# Patient Record
Sex: MALE | Race: WHITE | Employment: OTHER | URBAN - METROPOLITAN AREA
[De-identification: names, ages, dates, MRNs, and addresses within clinical notes are randomized per-mention and may not be internally consistent; named-entity substitution may affect disease eponyms.]

---

## 2017-02-10 ENCOUNTER — IP HISTORICAL/CONVERTED ENCOUNTER (OUTPATIENT)
Dept: OTHER | Age: 62
End: 2017-02-10

## 2017-03-06 ENCOUNTER — OP HISTORICAL/CONVERTED ENCOUNTER (OUTPATIENT)
Dept: OTHER | Age: 62
End: 2017-03-06

## 2017-04-04 ENCOUNTER — OP HISTORICAL/CONVERTED ENCOUNTER (OUTPATIENT)
Dept: OTHER | Age: 62
End: 2017-04-04

## 2017-08-05 ENCOUNTER — OP HISTORICAL/CONVERTED ENCOUNTER (OUTPATIENT)
Dept: OTHER | Age: 62
End: 2017-08-05

## 2017-08-14 ENCOUNTER — OP HISTORICAL/CONVERTED ENCOUNTER (OUTPATIENT)
Dept: OTHER | Age: 62
End: 2017-08-14

## 2017-09-15 ENCOUNTER — IP HISTORICAL/CONVERTED ENCOUNTER (OUTPATIENT)
Dept: OTHER | Age: 62
End: 2017-09-15

## 2017-11-09 ENCOUNTER — OP HISTORICAL/CONVERTED ENCOUNTER (OUTPATIENT)
Dept: OTHER | Age: 62
End: 2017-11-09

## 2017-12-17 ENCOUNTER — OP HISTORICAL/CONVERTED ENCOUNTER (OUTPATIENT)
Dept: OTHER | Age: 62
End: 2017-12-17

## 2018-02-13 ENCOUNTER — OP HISTORICAL/CONVERTED ENCOUNTER (OUTPATIENT)
Dept: OTHER | Age: 63
End: 2018-02-13

## 2018-08-20 ENCOUNTER — OP HISTORICAL/CONVERTED ENCOUNTER (OUTPATIENT)
Dept: OTHER | Age: 63
End: 2018-08-20

## 2018-09-05 ENCOUNTER — OP HISTORICAL/CONVERTED ENCOUNTER (OUTPATIENT)
Dept: OTHER | Age: 63
End: 2018-09-05

## 2018-10-21 ENCOUNTER — ED HISTORICAL/CONVERTED ENCOUNTER (OUTPATIENT)
Dept: OTHER | Age: 63
End: 2018-10-21

## 2019-02-25 ENCOUNTER — ED HISTORICAL/CONVERTED ENCOUNTER (OUTPATIENT)
Dept: OTHER | Age: 64
End: 2019-02-25

## 2019-03-31 ENCOUNTER — ED HISTORICAL/CONVERTED ENCOUNTER (OUTPATIENT)
Dept: OTHER | Age: 64
End: 2019-03-31

## 2019-04-20 ENCOUNTER — ED HISTORICAL/CONVERTED ENCOUNTER (OUTPATIENT)
Dept: OTHER | Age: 64
End: 2019-04-20

## 2019-07-27 ENCOUNTER — ED HISTORICAL/CONVERTED ENCOUNTER (OUTPATIENT)
Dept: OTHER | Age: 64
End: 2019-07-27

## 2019-08-31 ENCOUNTER — ED HISTORICAL/CONVERTED ENCOUNTER (OUTPATIENT)
Dept: OTHER | Age: 64
End: 2019-08-31

## 2020-02-11 ENCOUNTER — ED HISTORICAL/CONVERTED ENCOUNTER (OUTPATIENT)
Dept: OTHER | Age: 65
End: 2020-02-11

## 2020-02-21 ENCOUNTER — ED HISTORICAL/CONVERTED ENCOUNTER (OUTPATIENT)
Dept: OTHER | Age: 65
End: 2020-02-21

## 2020-02-28 ENCOUNTER — OP HISTORICAL/CONVERTED ENCOUNTER (OUTPATIENT)
Dept: OTHER | Age: 65
End: 2020-02-28

## 2020-06-09 ENCOUNTER — OP HISTORICAL/CONVERTED ENCOUNTER (OUTPATIENT)
Dept: OTHER | Age: 65
End: 2020-06-09

## 2021-06-27 ENCOUNTER — HOSPITAL ENCOUNTER (EMERGENCY)
Age: 66
Discharge: HOME | End: 2021-06-27
Attending: EMERGENCY MEDICINE
Payer: COMMERCIAL

## 2021-06-27 ENCOUNTER — APPOINTMENT (OUTPATIENT)
Dept: GENERAL RADIOLOGY | Age: 66
End: 2021-06-27
Attending: EMERGENCY MEDICINE
Payer: COMMERCIAL

## 2021-06-27 VITALS
WEIGHT: 216 LBS | BODY MASS INDEX: 30.92 KG/M2 | DIASTOLIC BLOOD PRESSURE: 98 MMHG | HEART RATE: 89 BPM | TEMPERATURE: 98.3 F | OXYGEN SATURATION: 97 % | HEIGHT: 70 IN | SYSTOLIC BLOOD PRESSURE: 128 MMHG | RESPIRATION RATE: 18 BRPM

## 2021-06-27 DIAGNOSIS — J20.9 ACUTE BRONCHITIS, UNSPECIFIED ORGANISM: ICD-10-CM

## 2021-06-27 DIAGNOSIS — J01.90 ACUTE SINUSITIS, RECURRENCE NOT SPECIFIED, UNSPECIFIED LOCATION: Primary | ICD-10-CM

## 2021-06-27 DIAGNOSIS — J45.901 MILD ASTHMA WITH ACUTE EXACERBATION, UNSPECIFIED WHETHER PERSISTENT: ICD-10-CM

## 2021-06-27 LAB
ALBUMIN SERPL-MCNC: 3.6 G/DL (ref 3.5–5)
ALBUMIN/GLOB SERPL: 1 {RATIO} (ref 1.1–2.2)
ALP SERPL-CCNC: 100 U/L (ref 45–117)
ALT SERPL-CCNC: 25 U/L (ref 12–78)
ANION GAP SERPL CALC-SCNC: 6 MMOL/L (ref 5–15)
APPEARANCE UR: CLEAR
AST SERPL W P-5'-P-CCNC: 14 U/L (ref 15–37)
BACTERIA URNS QL MICRO: NEGATIVE /HPF
BASOPHILS # BLD: 0 K/UL (ref 0–0.1)
BASOPHILS NFR BLD: 0 % (ref 0–1)
BILIRUB SERPL-MCNC: 0.5 MG/DL (ref 0.2–1)
BILIRUB UR QL: NEGATIVE
BNP SERPL-MCNC: 96 PG/ML
BUN SERPL-MCNC: 17 MG/DL (ref 6–20)
BUN/CREAT SERPL: 18 (ref 12–20)
CA-I BLD-MCNC: 8.5 MG/DL (ref 8.5–10.1)
CHLORIDE SERPL-SCNC: 108 MMOL/L (ref 97–108)
CO2 SERPL-SCNC: 26 MMOL/L (ref 21–32)
COLOR UR: ABNORMAL
CREAT SERPL-MCNC: 0.94 MG/DL (ref 0.7–1.3)
DIFFERENTIAL METHOD BLD: ABNORMAL
EOSINOPHIL # BLD: 0.6 K/UL (ref 0–0.4)
EOSINOPHIL NFR BLD: 9 % (ref 0–7)
ERYTHROCYTE [DISTWIDTH] IN BLOOD BY AUTOMATED COUNT: 12 % (ref 11.5–14.5)
GLOBULIN SER CALC-MCNC: 3.7 G/DL (ref 2–4)
GLUCOSE SERPL-MCNC: 125 MG/DL (ref 65–100)
GLUCOSE UR STRIP.AUTO-MCNC: NEGATIVE MG/DL
HCT VFR BLD AUTO: 43.8 % (ref 36.6–50.3)
HGB BLD-MCNC: 14.6 G/DL (ref 12.1–17)
HGB UR QL STRIP: NEGATIVE
IMM GRANULOCYTES # BLD AUTO: 0 K/UL (ref 0–0.04)
IMM GRANULOCYTES NFR BLD AUTO: 0 % (ref 0–0.5)
KETONES UR QL STRIP.AUTO: NEGATIVE MG/DL
LEUKOCYTE ESTERASE UR QL STRIP.AUTO: ABNORMAL
LYMPHOCYTES # BLD: 1.4 K/UL (ref 0.8–3.5)
LYMPHOCYTES NFR BLD: 20 % (ref 12–49)
MCH RBC QN AUTO: 28.5 PG (ref 26–34)
MCHC RBC AUTO-ENTMCNC: 33.3 G/DL (ref 30–36.5)
MCV RBC AUTO: 85.5 FL (ref 80–99)
MONOCYTES # BLD: 0.4 K/UL (ref 0–1)
MONOCYTES NFR BLD: 6 % (ref 5–13)
NEUTS SEG # BLD: 4.6 K/UL (ref 1.8–8)
NEUTS SEG NFR BLD: 65 % (ref 32–75)
NITRITE UR QL STRIP.AUTO: NEGATIVE
NRBC # BLD: 0 K/UL (ref 0–0.01)
NRBC BLD-RTO: 0 PER 100 WBC
PH UR STRIP: 6 [PH] (ref 5–8)
PLATELET # BLD AUTO: 167 K/UL (ref 150–400)
PMV BLD AUTO: 9.3 FL (ref 8.9–12.9)
POTASSIUM SERPL-SCNC: 3.4 MMOL/L (ref 3.5–5.1)
PROT SERPL-MCNC: 7.3 G/DL (ref 6.4–8.2)
PROT UR STRIP-MCNC: NEGATIVE MG/DL
RBC # BLD AUTO: 5.12 M/UL (ref 4.1–5.7)
RBC #/AREA URNS HPF: ABNORMAL /HPF (ref 0–5)
SODIUM SERPL-SCNC: 140 MMOL/L (ref 136–145)
SP GR UR REFRACTOMETRY: 1.01 (ref 1–1.03)
TROPONIN I SERPL-MCNC: <0.05 NG/ML
UA: UC IF INDICATED,UAUC: ABNORMAL
UROBILINOGEN UR QL STRIP.AUTO: 0.1 EU/DL (ref 0.1–1)
WBC # BLD AUTO: 7.2 K/UL (ref 4.1–11.1)
WBC URNS QL MICRO: ABNORMAL /HPF (ref 0–4)

## 2021-06-27 PROCEDURE — 74011250636 HC RX REV CODE- 250/636: Performed by: EMERGENCY MEDICINE

## 2021-06-27 PROCEDURE — 74011250637 HC RX REV CODE- 250/637: Performed by: EMERGENCY MEDICINE

## 2021-06-27 PROCEDURE — 94640 AIRWAY INHALATION TREATMENT: CPT

## 2021-06-27 PROCEDURE — 80053 COMPREHEN METABOLIC PANEL: CPT

## 2021-06-27 PROCEDURE — 96374 THER/PROPH/DIAG INJ IV PUSH: CPT

## 2021-06-27 PROCEDURE — 83880 ASSAY OF NATRIURETIC PEPTIDE: CPT

## 2021-06-27 PROCEDURE — 85025 COMPLETE CBC W/AUTO DIFF WBC: CPT

## 2021-06-27 PROCEDURE — 84484 ASSAY OF TROPONIN QUANT: CPT

## 2021-06-27 PROCEDURE — 36415 COLL VENOUS BLD VENIPUNCTURE: CPT

## 2021-06-27 PROCEDURE — 96375 TX/PRO/DX INJ NEW DRUG ADDON: CPT

## 2021-06-27 PROCEDURE — 81001 URINALYSIS AUTO W/SCOPE: CPT

## 2021-06-27 PROCEDURE — 99284 EMERGENCY DEPT VISIT MOD MDM: CPT

## 2021-06-27 PROCEDURE — 74011000250 HC RX REV CODE- 250: Performed by: EMERGENCY MEDICINE

## 2021-06-27 PROCEDURE — 71045 X-RAY EXAM CHEST 1 VIEW: CPT

## 2021-06-27 PROCEDURE — 93005 ELECTROCARDIOGRAM TRACING: CPT

## 2021-06-27 RX ORDER — HYDROCODONE BITARTRATE AND ACETAMINOPHEN 5; 325 MG/1; MG/1
1 TABLET ORAL
Status: COMPLETED | OUTPATIENT
Start: 2021-06-27 | End: 2021-06-27

## 2021-06-27 RX ORDER — IPRATROPIUM BROMIDE AND ALBUTEROL SULFATE 2.5; .5 MG/3ML; MG/3ML
3 SOLUTION RESPIRATORY (INHALATION)
Status: COMPLETED | OUTPATIENT
Start: 2021-06-27 | End: 2021-06-27

## 2021-06-27 RX ORDER — IPRATROPIUM BROMIDE AND ALBUTEROL SULFATE 2.5; .5 MG/3ML; MG/3ML
3 SOLUTION RESPIRATORY (INHALATION)
Qty: 30 NEBULE | Refills: 0 | Status: SHIPPED | OUTPATIENT
Start: 2021-06-27 | End: 2021-06-27

## 2021-06-27 RX ORDER — AZITHROMYCIN 250 MG/1
TABLET, FILM COATED ORAL
Qty: 6 TABLET | Refills: 0 | Status: SHIPPED | OUTPATIENT
Start: 2021-06-27

## 2021-06-27 RX ORDER — ALBUTEROL SULFATE 90 UG/1
2 AEROSOL, METERED RESPIRATORY (INHALATION)
Qty: 1 INHALER | Refills: 0 | Status: SHIPPED | OUTPATIENT
Start: 2021-06-27

## 2021-06-27 RX ORDER — FLUTICASONE FUROATE AND VILANTEROL TRIFENATATE 200; 25 UG/1; UG/1
1 POWDER RESPIRATORY (INHALATION) DAILY
Qty: 60 EACH | Refills: 0 | Status: SHIPPED | OUTPATIENT
Start: 2021-06-27

## 2021-06-27 RX ORDER — PREDNISONE 20 MG/1
60 TABLET ORAL DAILY
Qty: 15 TABLET | Refills: 0 | Status: SHIPPED | OUTPATIENT
Start: 2021-06-27 | End: 2021-07-02

## 2021-06-27 RX ORDER — DEXTROMETHORPHAN HYDROBROMIDE, GUAIFENESIN 20; 400 MG/20ML; MG/20ML
5 SOLUTION ORAL EVERY 6 HOURS
Qty: 200 ML | Refills: 0 | Status: SHIPPED | OUTPATIENT
Start: 2021-06-27

## 2021-06-27 RX ORDER — POTASSIUM CHLORIDE 750 MG/1
20 TABLET, FILM COATED, EXTENDED RELEASE ORAL
Status: COMPLETED | OUTPATIENT
Start: 2021-06-27 | End: 2021-06-27

## 2021-06-27 RX ORDER — TRAMADOL HYDROCHLORIDE 50 MG/1
50 TABLET ORAL
Qty: 12 TABLET | Refills: 0 | Status: SHIPPED | OUTPATIENT
Start: 2021-06-27 | End: 2021-06-30

## 2021-06-27 RX ADMIN — HYDROCODONE BITARTRATE AND ACETAMINOPHEN 1 TABLET: 5; 325 TABLET ORAL at 15:07

## 2021-06-27 RX ADMIN — CEFTRIAXONE 1 G: 1 INJECTION, POWDER, FOR SOLUTION INTRAMUSCULAR; INTRAVENOUS at 15:05

## 2021-06-27 RX ADMIN — IPRATROPIUM BROMIDE AND ALBUTEROL SULFATE 3 ML: .5; 3 SOLUTION RESPIRATORY (INHALATION) at 15:48

## 2021-06-27 RX ADMIN — METHYLPREDNISOLONE SODIUM SUCCINATE 125 MG: 125 INJECTION, POWDER, FOR SOLUTION INTRAMUSCULAR; INTRAVENOUS at 15:05

## 2021-06-27 RX ADMIN — IPRATROPIUM BROMIDE AND ALBUTEROL SULFATE 3 ML: .5; 3 SOLUTION RESPIRATORY (INHALATION) at 15:08

## 2021-06-27 RX ADMIN — POTASSIUM CHLORIDE 20 MEQ: 750 TABLET, FILM COATED, EXTENDED RELEASE ORAL at 16:33

## 2021-06-27 NOTE — DISCHARGE INSTRUCTIONS
Thank you! Thank you for allowing me to care for you in the emergency department. I sincerely hope that you are satisfied with your visit today. It is my goal to provide you with excellent care. Below you will find a list of your labs and imaging from your visit today. Should you have any questions regarding these results please do not hesitate to call the emergency department. Labs -     Recent Results (from the past 12 hour(s))   CBC WITH AUTOMATED DIFF    Collection Time: 06/27/21  2:34 PM   Result Value Ref Range    WBC 7.2 4.1 - 11.1 K/uL    RBC 5.12 4.10 - 5.70 M/uL    HGB 14.6 12.1 - 17.0 g/dL    HCT 43.8 36.6 - 50.3 %    MCV 85.5 80.0 - 99.0 FL    MCH 28.5 26.0 - 34.0 PG    MCHC 33.3 30.0 - 36.5 g/dL    RDW 12.0 11.5 - 14.5 %    PLATELET 571 640 - 607 K/uL    MPV 9.3 8.9 - 12.9 FL    NRBC 0.0 0.0  WBC    ABSOLUTE NRBC 0.00 0.00 - 0.01 K/uL    NEUTROPHILS 65 32 - 75 %    LYMPHOCYTES 20 12 - 49 %    MONOCYTES 6 5 - 13 %    EOSINOPHILS 9 (H) 0 - 7 %    BASOPHILS 0 0 - 1 %    IMMATURE GRANULOCYTES 0 0 - 0.5 %    ABS. NEUTROPHILS 4.6 1.8 - 8.0 K/UL    ABS. LYMPHOCYTES 1.4 0.8 - 3.5 K/UL    ABS. MONOCYTES 0.4 0.0 - 1.0 K/UL    ABS. EOSINOPHILS 0.6 (H) 0.0 - 0.4 K/UL    ABS. BASOPHILS 0.0 0.0 - 0.1 K/UL    ABS. IMM. GRANS. 0.0 0.00 - 0.04 K/UL    DF AUTOMATED     METABOLIC PANEL, COMPREHENSIVE    Collection Time: 06/27/21  2:34 PM   Result Value Ref Range    Sodium 140 136 - 145 mmol/L    Potassium 3.4 (L) 3.5 - 5.1 mmol/L    Chloride 108 97 - 108 mmol/L    CO2 26 21 - 32 mmol/L    Anion gap 6 5 - 15 mmol/L    Glucose 125 (H) 65 - 100 mg/dL    BUN 17 6 - 20 mg/dL    Creatinine 0.94 0.70 - 1.30 mg/dL    BUN/Creatinine ratio 18 12 - 20      GFR est AA >60 >60 ml/min/1.73m2    GFR est non-AA >60 >60 ml/min/1.73m2    Calcium 8.5 8.5 - 10.1 mg/dL    Bilirubin, total 0.5 0.2 - 1.0 mg/dL    AST (SGOT) 14 (L) 15 - 37 U/L    ALT (SGPT) 25 12 - 78 U/L    Alk.  phosphatase 100 45 - 117 U/L    Protein, total 7.3 6.4 - 8.2 g/dL    Albumin 3.6 3.5 - 5.0 g/dL    Globulin 3.7 2.0 - 4.0 g/dL    A-G Ratio 1.0 (L) 1.1 - 2.2     TROPONIN I    Collection Time: 06/27/21  2:34 PM   Result Value Ref Range    Troponin-I, Qt. <0.05 <0.05 ng/mL   BNP    Collection Time: 06/27/21  2:34 PM   Result Value Ref Range    NT pro-BNP 96 <125 pg/mL   URINALYSIS W/ REFLEX CULTURE    Collection Time: 06/27/21  3:55 PM    Specimen: Urine   Result Value Ref Range    Color Yellow/Straw      Appearance Clear Clear      Specific gravity 1.008 1.003 - 1.030      pH (UA) 6.0 5.0 - 8.0      Protein Negative Negative mg/dL    Glucose Negative Negative mg/dL    Ketone Negative Negative mg/dL    Bilirubin Negative Negative      Blood Negative Negative      Urobilinogen 0.1 0.1 - 1.0 EU/dL    Nitrites Negative Negative      Leukocyte Esterase Trace (A) Negative      UA:UC IF INDICATED Culture not indicated by UA result Culture not indicated by UA result      WBC 0-4 0 - 4 /hpf    RBC 0-5 0 - 5 /hpf    Bacteria Negative Negative /hpf       Radiologic Studies -   XR CHEST SNGL V   Final Result   No acute cardiopulmonary findings. CT Results  (Last 48 hours)      None          CXR Results  (Last 48 hours)                 06/27/21 1443  XR CHEST SNGL V Final result    Impression:  No acute cardiopulmonary findings. Narrative:  Study: Chest radiograph(s), 1 view       Clinical Indication: Cough. Comparison: Chest radiograph dated 2/21/2020. Findings: The cardiac silhouette is unchanged in size. Lung volumes are maintained. No focal consolidation, large pleural effusion, or   discernible pneumothorax. If you feel that you have not received excellent quality care or timely care, please ask to speak to the nurse manager. Please choose us in the future for your continued health care needs.    ------------------------------------------------------------------------------------------------------------  The exam and treatment you received in the Emergency Department were for an urgent problem and are not intended as complete care. It is important that you follow-up with a doctor, nurse practitioner, or physician assistant to:  (1) confirm your diagnosis,  (2) re-evaluation of changes in your illness and treatment, and  (3) for ongoing care. If your symptoms become worse or you do not improve as expected and you are unable to reach your usual health care provider, you should return to the Emergency Department. We are available 24 hours a day. Please take your discharge instructions with you when you go to your follow-up appointment. If you have any problem arranging a follow-up appointment, contact the Emergency Department immediately. If a prescription has been provided, please have it filled as soon as possible to prevent a delay in treatment. Read the entire medication instruction sheet provided to you by the pharmacy. If you have any questions or reservations about taking the medication due to side effects or interactions with other medications, please call your primary care physician or contact the ER to speak with the charge nurse. Make an appointment with your family doctor or the physician you were referred to for follow-up of this visit as instructed on your discharge paperwork, as this is a mandatory follow-up. Return to the ER if you are unable to be seen or if you are unable to be seen in a timely manner. If you have any problem arranging the follow-up visit, contact the Emergency Department immediately.

## 2021-06-27 NOTE — ED NOTES
Peripheral IV removed. Pt ambulated A&Ox4, GCS 15 to ED lobby accompanied by wife. In possession of personal belongings and discharge instructions.

## 2021-06-28 LAB
ATRIAL RATE: 95 BPM
CALCULATED P AXIS, ECG09: 56 DEGREES
CALCULATED R AXIS, ECG10: 7 DEGREES
CALCULATED T AXIS, ECG11: 42 DEGREES
DIAGNOSIS, 93000: NORMAL
P-R INTERVAL, ECG05: 158 MS
Q-T INTERVAL, ECG07: 342 MS
QRS DURATION, ECG06: 74 MS
QTC CALCULATION (BEZET), ECG08: 429 MS
VENTRICULAR RATE, ECG03: 95 BPM

## 2021-06-29 NOTE — ED PROVIDER NOTES
EMERGENCY DEPARTMENT HISTORY AND PHYSICAL EXAM      Date: 6/27/2021  Patient Name: Candace Apple    History of Presenting Illness     Chief Complaint   Patient presents with    Cough    Shortness of Breath    Fatigue       History Provided By: Patient    HPI: Candace Apple, 77 y.o. male with a past medical history significant asthma presents to the ED with chief complaint of Cough, Shortness of Breath, and Fatigue  . 77year-old with cough congestion lots of sinus drainage. Wheezing. No fevers. No nausea or vomiting. Symptoms present for the last few days. No exposure to Covid that he knows of. There are no other complaints, changes, or physical findings at this time. PCP: America Padron MD    Current Outpatient Medications   Medication Sig Dispense Refill    predniSONE (DELTASONE) 20 mg tablet Take 60 mg by mouth daily for 5 days. With Breakfast 15 Tablet 0    azithromycin (Zithromax Z-Giancarlo) 250 mg tablet Take 2 tablet p.o. day 1 then 1 tablet p.o. day 2 through 5 6 Tablet 0    albuterol (PROVENTIL HFA, VENTOLIN HFA, PROAIR HFA) 90 mcg/actuation inhaler Take 2 Puffs by inhalation every four (4) hours as needed for Wheezing. 1 Inhaler 0    dextromethorphan-guaiFENesin (Robitussin Cough-Chest Tim DM) 5-100 mg/5 mL liqd Take 5 mL by mouth every six (6) hours. 200 mL 0    traMADoL (Ultram) 50 mg tablet Take 1 Tablet by mouth every six (6) hours as needed for Pain for up to 3 days. Max Daily Amount: 200 mg. 12 Tablet 0    fluticasone furoate-vilanteroL (Breo Ellipta) 200-25 mcg/dose inhaler Take 1 Puff by inhalation daily. 61 Each 0       Past History     Past Medical History:  Past Medical History:   Diagnosis Date    Asthma     Hypertension        Past Surgical History:  History reviewed. No pertinent surgical history. Family History:  History reviewed. No pertinent family history.     Social History:  Social History     Tobacco Use    Smoking status: Never Smoker    Smokeless tobacco: Never Used   Substance Use Topics    Alcohol use: Not on file    Drug use: Not on file       Allergies:  No Known Allergies      Review of Systems   Review of Systems   Constitutional: Negative. Negative for chills, fatigue and fever. HENT: Negative. Negative for congestion, ear pain, nosebleeds and sore throat. Eyes: Negative. Negative for pain, discharge and visual disturbance. Respiratory: Positive for cough and shortness of breath. Negative for chest tightness. Cardiovascular: Negative. Negative for chest pain and leg swelling. Gastrointestinal: Negative. Negative for abdominal pain, blood in stool, constipation, diarrhea, nausea and vomiting. Endocrine: Negative. Genitourinary: Negative. Negative for difficulty urinating, dysuria and flank pain. Musculoskeletal: Negative. Negative for back pain and myalgias. Skin: Negative. Negative for rash and wound. Allergic/Immunologic: Negative. Neurological: Negative. Negative for dizziness, syncope, weakness, numbness and headaches. Hematological: Negative. Does not bruise/bleed easily. Psychiatric/Behavioral: Negative. Negative for agitation, confusion, hallucinations and suicidal ideas. All other systems reviewed and are negative. Physical Exam   Physical Exam  Vitals and nursing note reviewed. Constitutional:       General: He is not in acute distress. Appearance: He is normal weight. He is not ill-appearing. HENT:      Head: Normocephalic and atraumatic. Right Ear: External ear normal.      Left Ear: External ear normal.      Nose: Nose normal. No rhinorrhea. Mouth/Throat:      Mouth: Mucous membranes are moist.      Pharynx: Oropharynx is clear. Eyes:      Extraocular Movements: Extraocular movements intact. Conjunctiva/sclera: Conjunctivae normal.      Pupils: Pupils are equal, round, and reactive to light. Cardiovascular:      Rate and Rhythm: Normal rate and regular rhythm. Pulses: Normal pulses. Heart sounds: Normal heart sounds. Pulmonary:      Effort: Pulmonary effort is normal. No respiratory distress. Breath sounds: Normal breath sounds. Abdominal:      General: Abdomen is flat. Bowel sounds are normal.      Palpations: Abdomen is soft. Musculoskeletal:         General: No tenderness or deformity. Normal range of motion. Cervical back: Normal range of motion and neck supple. Skin:     General: Skin is warm and dry. Capillary Refill: Capillary refill takes less than 2 seconds. Findings: No bruising, lesion or rash. Neurological:      General: No focal deficit present. Mental Status: He is alert and oriented to person, place, and time. Mental status is at baseline. Psychiatric:         Mood and Affect: Mood normal.         Behavior: Behavior normal.         Thought Content: Thought content normal.         Judgment: Judgment normal.         Diagnostic Study Results     Labs -   No results found for this or any previous visit (from the past 12 hour(s)). Radiologic Studies -   XR CHEST SNGL V   Final Result   No acute cardiopulmonary findings. CT Results  (Last 48 hours)    None        CXR Results  (Last 48 hours)               06/27/21 1443  XR CHEST SNGL V Final result    Impression:  No acute cardiopulmonary findings. Narrative:  Study: Chest radiograph(s), 1 view       Clinical Indication: Cough. Comparison: Chest radiograph dated 2/21/2020. Findings: The cardiac silhouette is unchanged in size. Lung volumes are maintained. No focal consolidation, large pleural effusion, or   discernible pneumothorax. Medical Decision Making and ED Course   I am the first provider for this patient. I reviewed the vital signs, available nursing notes, past medical history, past surgical history, family history and social history. Vital Signs-Reviewed the patient's vital signs.   No data found.    EKG interpretation:         Records Reviewed: Previous Hospital chart. EMS run report      ED Course:   Initial assessment performed. The patients presenting problems have been discussed, and they are in agreement with the care plan formulated and outlined with them. I have encouraged them to ask questions as they arise throughout their visit. Orders Placed This Encounter    XR CHEST SNGL V     Standing Status:   Standing     Number of Occurrences:   1     Order Specific Question:   Transport     Answer:   BED [2]     Order Specific Question:   Reason for Exam     Answer:   cough    CBC WITH AUTOMATED DIFF     Standing Status:   Standing     Number of Occurrences:   1    METABOLIC PANEL, COMPREHENSIVE     Standing Status:   Standing     Number of Occurrences:   1    TROPONIN I     Standing Status:   Standing     Number of Occurrences:   1    BNP     Standing Status:   Standing     Number of Occurrences:   1    URINALYSIS W/ REFLEX CULTURE     Standing Status:   Standing     Number of Occurrences:   1    EKG 12 LEAD INITIAL     Standing Status:   Standing     Number of Occurrences:   1     Order Specific Question:   Reason for Exam:     Answer:   sob    albuterol-ipratropium (DUO-NEB) 2.5 MG-0.5 MG/3 ML     Order Specific Question:   MODE OF DELIVERY     Answer:   Nebulizer     Order Specific Question:   Initiate RT Bronchodilator Protocol     Answer:    Yes    methylPREDNISolone (PF) (Solu-MEDROL) injection 125 mg    cefTRIAXone (ROCEPHIN) 1 g in sterile water (preservative free) 10 mL IV syringe     Order Specific Question:   Antibiotic Indications     Answer:   Pneumonia (CAP)    HYDROcodone-acetaminophen (NORCO) 5-325 mg per tablet 1 Tablet    potassium chloride SR (KLOR-CON 10) tablet 20 mEq    albuterol-ipratropium (DUO-NEB) 2.5 MG-0.5 MG/3 ML     Order Specific Question:   MODE OF DELIVERY     Answer:   Nebulizer     Order Specific Question:   Initiate RT Bronchodilator Protocol Answer: Yes    albuterol-ipratropium (DUO-NEB) 2.5 mg-0.5 mg/3 ml nebu     Sig: 3 mL by Nebulization route now for 1 dose. Dispense:  30 Nebule     Refill:  0    predniSONE (DELTASONE) 20 mg tablet     Sig: Take 60 mg by mouth daily for 5 days. With Breakfast     Dispense:  15 Tablet     Refill:  0    azithromycin (Zithromax Z-Giancarlo) 250 mg tablet     Sig: Take 2 tablet p.o. day 1 then 1 tablet p.o. day 2 through 5     Dispense:  6 Tablet     Refill:  0    albuterol (PROVENTIL HFA, VENTOLIN HFA, PROAIR HFA) 90 mcg/actuation inhaler     Sig: Take 2 Puffs by inhalation every four (4) hours as needed for Wheezing. Dispense:  1 Inhaler     Refill:  0    dextromethorphan-guaiFENesin (Robitussin Cough-Chest Tim DM) 5-100 mg/5 mL liqd     Sig: Take 5 mL by mouth every six (6) hours. Dispense:  200 mL     Refill:  0    traMADoL (Ultram) 50 mg tablet     Sig: Take 1 Tablet by mouth every six (6) hours as needed for Pain for up to 3 days. Max Daily Amount: 200 mg. Dispense:  12 Tablet     Refill:  0    fluticasone furoate-vilanteroL (Breo Ellipta) 200-25 mcg/dose inhaler     Sig: Take 1 Puff by inhalation daily. Dispense:  60 Each     Refill:  0              CONSULTANTS:  Consults      Provider Notes (Medical Decision Making):   64-year-old with no signs of pneumonia cough congestion improved after breathing treatments x2 not hypoxic or tachypneic at discharge. bronchitis      Procedures                       Disposition       Emergency Department Disposition:  Discharged      Diagnosis     Clinical Impression:   1. Acute sinusitis, recurrence not specified, unspecified location    2. Acute bronchitis, unspecified organism    3. Mild asthma with acute exacerbation, unspecified whether persistent        Attestations:    Aliyah Parker MD    Please note that this dictation was completed with [employer], the [employer] voice recognition software.   Quite often unanticipated grammatical, syntax, homophones, and other interpretive errors are inadvertently transcribed by the computer software. Please disregard these errors. Please excuse any errors that have escaped final proofreading. Thank you.

## 2021-07-31 ENCOUNTER — APPOINTMENT (OUTPATIENT)
Dept: GENERAL RADIOLOGY | Age: 66
End: 2021-07-31
Attending: EMERGENCY MEDICINE
Payer: COMMERCIAL

## 2021-07-31 ENCOUNTER — HOSPITAL ENCOUNTER (EMERGENCY)
Age: 66
Discharge: HOME | End: 2021-07-31
Payer: COMMERCIAL

## 2021-07-31 VITALS
BODY MASS INDEX: 31.99 KG/M2 | HEART RATE: 104 BPM | HEIGHT: 69 IN | WEIGHT: 216 LBS | SYSTOLIC BLOOD PRESSURE: 139 MMHG | DIASTOLIC BLOOD PRESSURE: 86 MMHG | RESPIRATION RATE: 23 BRPM | TEMPERATURE: 98.4 F | OXYGEN SATURATION: 93 %

## 2021-07-31 DIAGNOSIS — R07.89 ATYPICAL CHEST PAIN: Primary | ICD-10-CM

## 2021-07-31 LAB
ALBUMIN SERPL-MCNC: 3.4 G/DL (ref 3.5–5)
ALBUMIN/GLOB SERPL: 0.9 {RATIO} (ref 1.1–2.2)
ALP SERPL-CCNC: 99 U/L (ref 45–117)
ALT SERPL-CCNC: 26 U/L (ref 12–78)
ANION GAP SERPL CALC-SCNC: 5 MMOL/L (ref 5–15)
AST SERPL W P-5'-P-CCNC: 13 U/L (ref 15–37)
BASOPHILS # BLD: 0 K/UL (ref 0–0.1)
BASOPHILS NFR BLD: 0 % (ref 0–1)
BILIRUB SERPL-MCNC: 0.4 MG/DL (ref 0.2–1)
BUN SERPL-MCNC: 15 MG/DL (ref 6–20)
BUN/CREAT SERPL: 20 (ref 12–20)
CA-I BLD-MCNC: 8.7 MG/DL (ref 8.5–10.1)
CHLORIDE SERPL-SCNC: 108 MMOL/L (ref 97–108)
CO2 SERPL-SCNC: 27 MMOL/L (ref 21–32)
CREAT SERPL-MCNC: 0.76 MG/DL (ref 0.7–1.3)
DIFFERENTIAL METHOD BLD: ABNORMAL
EOSINOPHIL # BLD: 0.6 K/UL (ref 0–0.4)
EOSINOPHIL NFR BLD: 7 % (ref 0–7)
ERYTHROCYTE [DISTWIDTH] IN BLOOD BY AUTOMATED COUNT: 11.9 % (ref 11.5–14.5)
GLOBULIN SER CALC-MCNC: 3.8 G/DL (ref 2–4)
GLUCOSE SERPL-MCNC: 118 MG/DL (ref 65–100)
HCT VFR BLD AUTO: 40.8 % (ref 36.6–50.3)
HGB BLD-MCNC: 13.9 G/DL (ref 12.1–17)
IMM GRANULOCYTES # BLD AUTO: 0 K/UL (ref 0–0.04)
IMM GRANULOCYTES NFR BLD AUTO: 0 % (ref 0–0.5)
LYMPHOCYTES # BLD: 1.7 K/UL (ref 0.8–3.5)
LYMPHOCYTES NFR BLD: 19 % (ref 12–49)
MCH RBC QN AUTO: 29.3 PG (ref 26–34)
MCHC RBC AUTO-ENTMCNC: 34.1 G/DL (ref 30–36.5)
MCV RBC AUTO: 85.9 FL (ref 80–99)
MONOCYTES # BLD: 0.5 K/UL (ref 0–1)
MONOCYTES NFR BLD: 6 % (ref 5–13)
NEUTS SEG # BLD: 6.2 K/UL (ref 1.8–8)
NEUTS SEG NFR BLD: 68 % (ref 32–75)
NRBC # BLD: 0 K/UL (ref 0–0.01)
NRBC BLD-RTO: 0 PER 100 WBC
PLATELET # BLD AUTO: 162 K/UL (ref 150–400)
PMV BLD AUTO: 9.3 FL (ref 8.9–12.9)
POTASSIUM SERPL-SCNC: 3.3 MMOL/L (ref 3.5–5.1)
PROT SERPL-MCNC: 7.2 G/DL (ref 6.4–8.2)
RBC # BLD AUTO: 4.75 M/UL (ref 4.1–5.7)
SODIUM SERPL-SCNC: 140 MMOL/L (ref 136–145)
TROPONIN I SERPL-MCNC: <0.05 NG/ML
TROPONIN I SERPL-MCNC: <0.05 NG/ML
WBC # BLD AUTO: 9 K/UL (ref 4.1–11.1)

## 2021-07-31 PROCEDURE — 71045 X-RAY EXAM CHEST 1 VIEW: CPT

## 2021-07-31 PROCEDURE — 84484 ASSAY OF TROPONIN QUANT: CPT

## 2021-07-31 PROCEDURE — 85025 COMPLETE CBC W/AUTO DIFF WBC: CPT

## 2021-07-31 PROCEDURE — 80053 COMPREHEN METABOLIC PANEL: CPT

## 2021-07-31 PROCEDURE — 74011250637 HC RX REV CODE- 250/637: Performed by: PHYSICIAN ASSISTANT

## 2021-07-31 PROCEDURE — 36415 COLL VENOUS BLD VENIPUNCTURE: CPT

## 2021-07-31 PROCEDURE — 93005 ELECTROCARDIOGRAM TRACING: CPT

## 2021-07-31 PROCEDURE — 99285 EMERGENCY DEPT VISIT HI MDM: CPT

## 2021-07-31 PROCEDURE — 94640 AIRWAY INHALATION TREATMENT: CPT

## 2021-07-31 PROCEDURE — 74011000250 HC RX REV CODE- 250: Performed by: PHYSICIAN ASSISTANT

## 2021-07-31 RX ORDER — CHOLECALCIFEROL (VITAMIN D3) 25 MCG
TABLET ORAL
COMMUNITY
Start: 2021-07-27

## 2021-07-31 RX ORDER — SODIUM CHLORIDE 0.9 % (FLUSH) 0.9 %
5-40 SYRINGE (ML) INJECTION AS NEEDED
Status: DISCONTINUED | OUTPATIENT
Start: 2021-07-31 | End: 2021-07-31 | Stop reason: HOSPADM

## 2021-07-31 RX ORDER — IPRATROPIUM BROMIDE AND ALBUTEROL SULFATE 2.5; .5 MG/3ML; MG/3ML
3 SOLUTION RESPIRATORY (INHALATION)
Status: COMPLETED | OUTPATIENT
Start: 2021-07-31 | End: 2021-07-31

## 2021-07-31 RX ORDER — AMLODIPINE BESYLATE 5 MG/1
5 TABLET ORAL DAILY
COMMUNITY
Start: 2021-06-15

## 2021-07-31 RX ORDER — ACETAMINOPHEN 500 MG
1000 TABLET ORAL
Status: COMPLETED | OUTPATIENT
Start: 2021-07-31 | End: 2021-07-31

## 2021-07-31 RX ORDER — TAMSULOSIN HYDROCHLORIDE 0.4 MG/1
CAPSULE ORAL
COMMUNITY

## 2021-07-31 RX ORDER — SODIUM CHLORIDE 0.9 % (FLUSH) 0.9 %
5-40 SYRINGE (ML) INJECTION EVERY 8 HOURS
Status: DISCONTINUED | OUTPATIENT
Start: 2021-07-31 | End: 2021-07-31 | Stop reason: HOSPADM

## 2021-07-31 RX ADMIN — ACETAMINOPHEN 1000 MG: 500 TABLET ORAL at 15:31

## 2021-07-31 RX ADMIN — IPRATROPIUM BROMIDE AND ALBUTEROL SULFATE 3 ML: .5; 2.5 SOLUTION RESPIRATORY (INHALATION) at 16:23

## 2021-07-31 NOTE — ED PROVIDER NOTES
EMERGENCY DEPARTMENT HISTORY AND PHYSICAL EXAM      Date: 7/31/2021  Patient Name: Mary Cohen    History of Presenting Illness     Chief Complaint   Patient presents with    Shortness of Breath       History Provided By: Patient    HPI: Mary Cohen, 74 y.o. male with a past medical history significant htn and asthma who presents to the ED with cc of sob and sharp substernal chest pain that occurred around 9am today. There are no other complaints, changes, or physical findings at this time. PCP: America Gonzalez MD    No current facility-administered medications on file prior to encounter. Current Outpatient Medications on File Prior to Encounter   Medication Sig Dispense Refill    amLODIPine (NORVASC) 5 mg tablet Take 5 mg by mouth daily.  tamsulosin (FLOMAX) 0.4 mg capsule tamsulosin 0.4 mg capsule      Vitamin D3 25 mcg (1,000 unit) tablet       azithromycin (Zithromax Z-Giancarlo) 250 mg tablet Take 2 tablet p.o. day 1 then 1 tablet p.o. day 2 through 5 6 Tablet 0    albuterol (PROVENTIL HFA, VENTOLIN HFA, PROAIR HFA) 90 mcg/actuation inhaler Take 2 Puffs by inhalation every four (4) hours as needed for Wheezing. 1 Inhaler 0    dextromethorphan-guaiFENesin (Robitussin Cough-Chest Tim DM) 5-100 mg/5 mL liqd Take 5 mL by mouth every six (6) hours. 200 mL 0    fluticasone furoate-vilanteroL (Breo Ellipta) 200-25 mcg/dose inhaler Take 1 Puff by inhalation daily. 61 Each 0       Past History     Past Medical History:  Past Medical History:   Diagnosis Date    Asthma     Hypertension        Past Surgical History:  No past surgical history on file. Family History:  No family history on file.     Social History:  Social History     Tobacco Use    Smoking status: Never Smoker    Smokeless tobacco: Never Used   Substance Use Topics    Alcohol use: Not on file    Drug use: Not on file       Allergies:  No Known Allergies      Review of Systems     Review of Systems   Constitutional: Negative. HENT: Negative. Eyes: Negative. Respiratory: Positive for shortness of breath. Cardiovascular: Positive for chest pain. Gastrointestinal: Negative. Endocrine: Negative. Genitourinary: Negative. Musculoskeletal: Negative. Skin: Negative. Allergic/Immunologic: Negative. Neurological: Negative. Hematological: Negative. Psychiatric/Behavioral: Negative. Physical Exam     Physical Exam  Vitals and nursing note reviewed. Constitutional:       Appearance: He is well-developed and normal weight. Cardiovascular:      Rate and Rhythm: Normal rate and regular rhythm. Pulmonary:      Effort: Pulmonary effort is normal.      Breath sounds: Normal breath sounds. Abdominal:      General: Bowel sounds are normal.      Palpations: Abdomen is soft. Musculoskeletal:         General: Normal range of motion. Skin:     General: Skin is warm and dry. Neurological:      General: No focal deficit present. Mental Status: He is alert and oriented to person, place, and time. Psychiatric:         Mood and Affect: Mood normal.         Behavior: Behavior normal.         Lab and Diagnostic Study Results     Labs -     Recent Results (from the past 12 hour(s))   METABOLIC PANEL, COMPREHENSIVE    Collection Time: 07/31/21 11:30 AM   Result Value Ref Range    Sodium 140 136 - 145 mmol/L    Potassium 3.3 (L) 3.5 - 5.1 mmol/L    Chloride 108 97 - 108 mmol/L    CO2 27 21 - 32 mmol/L    Anion gap 5 5 - 15 mmol/L    Glucose 118 (H) 65 - 100 mg/dL    BUN 15 6 - 20 mg/dL    Creatinine 0.76 0.70 - 1.30 mg/dL    BUN/Creatinine ratio 20 12 - 20      GFR est AA >60 >60 ml/min/1.73m2    GFR est non-AA >60 >60 ml/min/1.73m2    Calcium 8.7 8.5 - 10.1 mg/dL    Bilirubin, total 0.4 0.2 - 1.0 mg/dL    AST (SGOT) 13 (L) 15 - 37 U/L    ALT (SGPT) 26 12 - 78 U/L    Alk.  phosphatase 99 45 - 117 U/L    Protein, total 7.2 6.4 - 8.2 g/dL    Albumin 3.4 (L) 3.5 - 5.0 g/dL    Globulin 3.8 2.0 - 4.0 g/dL    A-G Ratio 0.9 (L) 1.1 - 2.2     CBC WITH AUTOMATED DIFF    Collection Time: 07/31/21 11:30 AM   Result Value Ref Range    WBC 9.0 4.1 - 11.1 K/uL    RBC 4.75 4.10 - 5.70 M/uL    HGB 13.9 12.1 - 17.0 g/dL    HCT 40.8 36.6 - 50.3 %    MCV 85.9 80.0 - 99.0 FL    MCH 29.3 26.0 - 34.0 PG    MCHC 34.1 30.0 - 36.5 g/dL    RDW 11.9 11.5 - 14.5 %    PLATELET 162 903 - 091 K/uL    MPV 9.3 8.9 - 12.9 FL    NRBC 0.0 0.0  WBC    ABSOLUTE NRBC 0.00 0.00 - 0.01 K/uL    NEUTROPHILS 68 32 - 75 %    LYMPHOCYTES 19 12 - 49 %    MONOCYTES 6 5 - 13 %    EOSINOPHILS 7 0 - 7 %    BASOPHILS 0 0 - 1 %    IMMATURE GRANULOCYTES 0 0 - 0.5 %    ABS. NEUTROPHILS 6.2 1.8 - 8.0 K/UL    ABS. LYMPHOCYTES 1.7 0.8 - 3.5 K/UL    ABS. MONOCYTES 0.5 0.0 - 1.0 K/UL    ABS. EOSINOPHILS 0.6 (H) 0.0 - 0.4 K/UL    ABS. BASOPHILS 0.0 0.0 - 0.1 K/UL    ABS. IMM. GRANS. 0.0 0.00 - 0.04 K/UL    DF AUTOMATED     TROPONIN I    Collection Time: 07/31/21 11:30 AM   Result Value Ref Range    Troponin-I, Qt. <0.05 <0.05 ng/mL   TROPONIN I    Collection Time: 07/31/21  3:05 PM   Result Value Ref Range    Troponin-I, Qt. <0.05 <0.05 ng/mL       Radiologic Studies -   @lastxrresult@  CT Results  (Last 48 hours)    None        CXR Results  (Last 48 hours)               07/31/21 1151  XR CHEST PORT Final result    Impression:  Underexpanded lung bases. No focal infiltrate. No overt edema. No   pneumothorax or effusion. Narrative:  HISTORY:  Shortness of breath       TECHNIQUE: Frontal view. COMPARISON: June 27, 2021   LIMITATIONS: None       TUBES/LINES: None       HEART AND PULMONARY VESSELS: Heart size and pulmonary blood flow are normal.       LUNG PARENCHYMA: Mild underinflation of the lung bases but similar pattern on   prior study. No developing infiltrate. PLEURA: No effusion or pneumothorax. MEDIASTINUM: Tortuous atherosclerotic aorta. BONE/SOFT TISSUES: Bony demineralization.        OTHER: None                   Medical Decision Making   - I am the first provider for this patient. - I reviewed the vital signs, available nursing notes, past medical history, past surgical history, family history and social history. - Initial assessment performed. The patients presenting problems have been discussed, and they are in agreement with the care plan formulated and outlined with them. I have encouraged them to ask questions as they arise throughout their visit. Vital Signs-Reviewed the patient's vital signs. Patient Vitals for the past 12 hrs:   Temp Pulse Resp BP SpO2   07/31/21 1200  (!) 104 23 139/86 93 %   07/31/21 1125 98.4 °F (36.9 °C) (!) 110 28 (!) 162/107 100 %       Records Reviewed: Old Medical Records    The patient presents with a differential diagnosis of chest pain and sob      ED Course:          Provider Notes (Medical Decision Making):     MDM  Number of Diagnoses or Management Options     Amount and/or Complexity of Data Reviewed  Clinical lab tests: ordered and reviewed  Tests in the radiology section of CPT®: ordered and reviewed  Discussion of test results with the performing providers: yes  Decide to obtain previous medical records or to obtain history from someone other than the patient: no  Review and summarize past medical records: no  Discuss the patient with other providers: yes    Risk of Complications, Morbidity, and/or Mortality  Presenting problems: moderate  Management options: moderate           Procedures   Medical Decision Makingedical Decision Making  Performed by: NEVAEH Vargas  PROCEDURES:  Procedures       Disposition   Disposition: Condition improved        DISCHARGE PLAN:  1. Current Discharge Medication List      CONTINUE these medications which have NOT CHANGED    Details   amLODIPine (NORVASC) 5 mg tablet Take 5 mg by mouth daily.       tamsulosin (FLOMAX) 0.4 mg capsule tamsulosin 0.4 mg capsule      Vitamin D3 25 mcg (1,000 unit) tablet       azithromycin (Zithromax Z-Giancarlo) 250 mg tablet Take 2 tablet p.o. day 1 then 1 tablet p.o. day 2 through 5  Qty: 6 Tablet, Refills: 0      albuterol (PROVENTIL HFA, VENTOLIN HFA, PROAIR HFA) 90 mcg/actuation inhaler Take 2 Puffs by inhalation every four (4) hours as needed for Wheezing. Qty: 1 Inhaler, Refills: 0      dextromethorphan-guaiFENesin (Robitussin Cough-Chest Tim DM) 5-100 mg/5 mL liqd Take 5 mL by mouth every six (6) hours. Qty: 200 mL, Refills: 0      fluticasone furoate-vilanteroL (Breo Ellipta) 200-25 mcg/dose inhaler Take 1 Puff by inhalation daily. Qty: 60 Each, Refills: 0           2. Follow-up Information     Follow up With Specialties Details Why Contact Info    Maude Dunn MD Internal Medicine Schedule an appointment as soon as possible for a visit in 3 days  [address] [place] [address]  [phone]          3. Return to ED if worse   4. Current Discharge Medication List            Diagnosis     Clinical Impression:   1. Atypical chest pain        Attestations:    Leah Goodpasture Page, PA    Please note that this dictation was completed with "[employer] the computer voice recognition software. Quite often unanticipated grammatical, syntax, homophones, and other interpretive errors are inadvertently transcribed by the computer software. Please disregard these errors. Please excuse any errors that have escaped final proofreading. Thank you.

## 2021-07-31 NOTE — LETTER
NOTIFICATION RETURN TO WORK / SCHOOL    7/31/2021 4:48 PM    Mr. Hayley Mahajan  [address] [address]      To Whom It May Concern:    Hayley Mahajan is currently under the care of [facility] EMERGENCY DEPT. He will return to work/school on: 8/1/21    If there are questions or concerns please have the patient contact our office.         Sincerely,      Rosendo Estevez LPN

## 2021-08-01 LAB
ATRIAL RATE: 107 BPM
CALCULATED P AXIS, ECG09: 45 DEGREES
CALCULATED R AXIS, ECG10: -11 DEGREES
CALCULATED T AXIS, ECG11: 49 DEGREES
DIAGNOSIS, 93000: NORMAL
P-R INTERVAL, ECG05: 168 MS
Q-T INTERVAL, ECG07: 352 MS
QRS DURATION, ECG06: 82 MS
QTC CALCULATION (BEZET), ECG08: 469 MS
VENTRICULAR RATE, ECG03: 107 BPM

## 2021-09-02 ENCOUNTER — HOSPITAL ENCOUNTER (EMERGENCY)
Age: 66
Discharge: HOME | End: 2021-09-02
Payer: COMMERCIAL

## 2021-09-02 ENCOUNTER — APPOINTMENT (OUTPATIENT)
Dept: CT IMAGING | Age: 66
End: 2021-09-02
Attending: STUDENT IN AN ORGANIZED HEALTH CARE EDUCATION/TRAINING PROGRAM
Payer: COMMERCIAL

## 2021-09-02 VITALS
DIASTOLIC BLOOD PRESSURE: 94 MMHG | TEMPERATURE: 98 F | SYSTOLIC BLOOD PRESSURE: 153 MMHG | HEART RATE: 84 BPM | RESPIRATION RATE: 20 BRPM | OXYGEN SATURATION: 98 %

## 2021-09-02 DIAGNOSIS — S00.83XA CONTUSION OF FOREHEAD, INITIAL ENCOUNTER: Primary | ICD-10-CM

## 2021-09-02 DIAGNOSIS — S09.90XA CLOSED HEAD INJURY, INITIAL ENCOUNTER: ICD-10-CM

## 2021-09-02 PROCEDURE — 70486 CT MAXILLOFACIAL W/O DYE: CPT

## 2021-09-02 PROCEDURE — 70450 CT HEAD/BRAIN W/O DYE: CPT

## 2021-09-02 PROCEDURE — 74011250637 HC RX REV CODE- 250/637: Performed by: PHYSICIAN ASSISTANT

## 2021-09-02 PROCEDURE — 99284 EMERGENCY DEPT VISIT MOD MDM: CPT

## 2021-09-02 RX ORDER — IBUPROFEN 600 MG/1
600 TABLET ORAL
Qty: 20 TABLET | Refills: 0 | Status: SHIPPED | OUTPATIENT
Start: 2021-09-02 | End: 2021-09-09

## 2021-09-02 RX ORDER — IBUPROFEN 600 MG/1
600 TABLET ORAL
Status: COMPLETED | OUTPATIENT
Start: 2021-09-02 | End: 2021-09-02

## 2021-09-02 RX ADMIN — IBUPROFEN 600 MG: 600 TABLET, FILM COATED ORAL at 19:34

## 2021-09-02 NOTE — ED PROVIDER NOTES
EMERGENCY DEPARTMENT HISTORY AND PHYSICAL EXAM      Date: 9/2/2021  Patient Name: Ayaka Tanner    History of Presenting Illness     Chief Complaint   Patient presents with    Fall    Head Injury       History Provided By: Patient    HPI: Ayaka Tanner, 74 y.o. male with a past medical history significant for asthma and hypertension who presents to the ED for evaluation s/p fall and head injury which occurred just PTA. Patient reports he was at work and walking across the floor when he accidentally slipped and fell forward hitting his forehead on a toolbox. No LOC. Does not take any anticoagulants or aspirin. No prodrome symptoms. Was sent here for evaluation given this occurred at work. Patient complaining of mild pain over area where he had his head on the toolbox, otherwise no other severe symptoms. Has not taken medications for symptoms. Patient denies fever, chills, chest pain, shortness of breath, nausea, vomiting, diarrhea, dizziness, lightheadedness, photophobia, visual changes (diplopia, blurred), numbness, tingling, one-sided weakness, speech difficulty, gait problems, neck pain, neck stiffness, eye pain    There are no other complaints, changes, or physical findings at this time. PCP: Carina Ba MD    No current facility-administered medications on file prior to encounter. Current Outpatient Medications on File Prior to Encounter   Medication Sig Dispense Refill    amLODIPine (NORVASC) 5 mg tablet Take 5 mg by mouth daily.  tamsulosin (FLOMAX) 0.4 mg capsule tamsulosin 0.4 mg capsule      Vitamin D3 25 mcg (1,000 unit) tablet       azithromycin (Zithromax Z-Giancarlo) 250 mg tablet Take 2 tablet p.o. day 1 then 1 tablet p.o. day 2 through 5 6 Tablet 0    albuterol (PROVENTIL HFA, VENTOLIN HFA, PROAIR HFA) 90 mcg/actuation inhaler Take 2 Puffs by inhalation every four (4) hours as needed for Wheezing.  1 Inhaler 0    dextromethorphan-guaiFENesin (Robitussin Cough-Chest Tim DM) 5-100 mg/5 mL liqd Take 5 mL by mouth every six (6) hours. 200 mL 0    fluticasone furoate-vilanteroL (Breo Ellipta) 200-25 mcg/dose inhaler Take 1 Puff by inhalation daily. 61 Each 0       Past History     Past Medical History:  Past Medical History:   Diagnosis Date    Asthma     Hypertension        Past Surgical History:  No past surgical history on file. Family History:  History reviewed. No pertinent family history. Social History:  Social History     Tobacco Use    Smoking status: Never Smoker    Smokeless tobacco: Never Used   Substance Use Topics    Alcohol use: Not on file    Drug use: Not on file       Allergies:  No Known Allergies      Review of Systems     Review of Systems   Constitutional: Negative for chills, fatigue and fever. HENT: Negative. Respiratory: Negative for cough, chest tightness, shortness of breath and wheezing. Cardiovascular: Negative for chest pain and palpitations. Gastrointestinal: Negative for abdominal pain, diarrhea, nausea and vomiting. Genitourinary: Negative for frequency and urgency. Musculoskeletal: Negative for back pain, neck pain and neck stiffness. Skin: Negative for rash. Neurological: Positive for headaches. Negative for dizziness, weakness and light-headedness. Psychiatric/Behavioral: Negative. All other systems reviewed and are negative. Physical Exam     Physical Exam  Vitals and nursing note reviewed. Constitutional:       General: He is not in acute distress. Appearance: Normal appearance. He is well-developed. He is not ill-appearing, toxic-appearing or diaphoretic. Comments: Observed to ambulate from stretcher down the hallway to restroom without any difficulty   HENT:      Head: Normocephalic. Abrasion and contusion present. No raccoon eyes, Escobar's sign, masses, right periorbital erythema, left periorbital erythema or laceration. Hair is normal.      Jaw: There is normal jaw occlusion.  No trismus, tenderness, swelling, pain on movement or malocclusion. Nose: Nose normal. No congestion or rhinorrhea. Mouth/Throat:      Mouth: Mucous membranes are moist.      Pharynx: Oropharynx is clear. No oropharyngeal exudate or posterior oropharyngeal erythema. Eyes:      General: No scleral icterus. Conjunctiva/sclera: Conjunctivae normal.      Pupils: Pupils are equal, round, and reactive to light. Cardiovascular:      Rate and Rhythm: Normal rate and regular rhythm. Pulses:           Radial pulses are 2+ on the right side and 2+ on the left side. Dorsalis pedis pulses are 2+ on the right side and 2+ on the left side. Heart sounds: No murmur heard. No friction rub. No gallop. Pulmonary:      Effort: Pulmonary effort is normal. No tachypnea, accessory muscle usage, respiratory distress or retractions. Breath sounds: Normal breath sounds. No stridor. No decreased breath sounds, wheezing, rhonchi or rales. Chest:      Chest wall: No tenderness. Abdominal:      General: Bowel sounds are normal. There is no distension. Palpations: Abdomen is soft. There is no mass. Tenderness: There is no abdominal tenderness. There is no right CVA tenderness, left CVA tenderness, guarding or rebound. Musculoskeletal:         General: No deformity. Normal range of motion. Cervical back: Normal range of motion and neck supple. No rigidity. No muscular tenderness. Right lower leg: No edema. Left lower leg: No edema. Skin:     General: Skin is warm and dry. Capillary Refill: Capillary refill takes less than 2 seconds. Coloration: Skin is not jaundiced or pale. Findings: No bruising, erythema or rash. Neurological:      General: No focal deficit present. Mental Status: He is alert and oriented to person, place, and time. Mental status is at baseline. Sensory: Sensation is intact. Motor: Motor function is intact.    Psychiatric: Mood and Affect: Mood normal.         Behavior: Behavior normal. Behavior is cooperative. Thought Content: Thought content normal.         Judgment: Judgment normal.         Lab and Diagnostic Study Results     Labs -   No results found for this or any previous visit (from the past 12 hour(s)). Radiologic Studies -   CT Results  (Last 48 hours)               09/02/21 1659  CT HEAD WO CONT Final result    Impression:  Sinus inflammation. No intracranial hemorrhage. Narrative:  CT head. Axial images are reviewed along with reformatted sagittal/coronal images. Dose reduction: All CT scans at this facility are performed using dose reduction   optimization techniques as appropriate to a performed exam including the   following-   automated exposure control, adjustments of mA and/or Kv according to patient   size, or use of iterative reconstructive technique. Mucosal wall thickening maxillary sinuses. Partial opacification ethmoid air   cells. Suspect inflammatory change. No superficial radiopaque foreign material;   however, clinical inspection could follow. Review of intracranial content reveals preserved gray-white differentiation. No   hydrocephalus. No intracranial hemorrhage. 09/02/21 1659  CT MAXILLOFACIAL WO CONT Final result    Impression:  No CT evidence for facial bone fracture. Sinus inflammation. Narrative:  CT facial bones. Axial images are reviewed along with reformatted sagittal/coronal images. Dose reduction: All CT scans at this facility are performed using dose reduction   optimization techniques as appropriate to a performed exam including the   following-   automated exposure control, adjustments of mA and/or Kv according to patient   size, or use of iterative reconstructive technique. .       Nasal bones intact. Zygomatic arches are intact. Right frontal scalp soft tissue swelling.  No definite superficial radiopaque   foreign material; however, clinical inspection could follow. Partial opacification ethmoid air cells. Nonuniform mucosal wall thickening   maxillary sinuses. Evidence for sinus inflammation. Coronal images reveal orbital margins are intact; no orbital wall fracture. Soft tissue axial images demonstrate symmetric appearance to orbital content;   globes intact. Lenses normally positioned. Sagittal images reveal mandibular condyles are normally positioned. No mandible   fracture. Normal alignment imaged upper cervical spine noting advanced cervical   spondylosis. Medical Decision Making   - I am the first provider for this patient. - I reviewed the vital signs, available nursing notes, past medical history, past surgical history, family history and social history. - Initial assessment performed. The patients presenting problems have been discussed, and they are in agreement with the care plan formulated and outlined with them. I have encouraged them to ask questions as they arise throughout their visit. Vital Signs-Reviewed the patient's vital signs. Patient Vitals for the past 12 hrs:   Temp Pulse Resp BP SpO2   09/02/21 1628 97.8 °F (36.6 °C) 90 16 (!) 166/92 98 %       Records Reviewed: Nursing Notes and Old Medical Records    The patient presents with head injury with a differential diagnosis of hematoma, contusion, closed head injury, ICH, skull fx      ED Course:          Provider Notes (Medical Decision Making):     MDM  Number of Diagnoses or Management Options  Closed head injury, initial encounter  Contusion of forehead, initial encounter  Diagnosis management comments:     20-year-old male sent here from work for evaluation after head injury. Not on any anticoagulants. CT head max face negative for acute process. Patient neurovascularly intact. Ambulatory without foot drop. No prodrome symptoms.   Return cautions discussed if he has worsening headache or develops any other neurologic symptoms. Patient agreeable with plan of care. Amount and/or Complexity of Data Reviewed  Tests in the radiology section of CPT®: ordered and reviewed  Review and summarize past medical records: yes    Patient Progress  Patient progress: stable           Disposition   Disposition: DC- Adult Discharges: All of the diagnostic tests were reviewed and questions answered. Diagnosis, care plan and treatment options were discussed. The patient understands the instructions and will follow up as directed. The patients results have been reviewed with them. They have been counseled regarding their diagnosis. The patient verbally convey understanding and agreement of the signs, symptoms, diagnosis, treatment and prognosis and additionally agrees to follow up as recommended with their PCP in 24 - 48 hours. They also agree with the care-plan and convey that all of their questions have been answered. I have also put together some discharge instructions for them that include: 1) educational information regarding their diagnosis, 2) how to care for their diagnosis at home, as well a 3) list of reasons why they would want to return to the ED prior to their follow-up appointment, should their condition change. Discharged    DISCHARGE PLAN:  1. Current Discharge Medication List      CONTINUE these medications which have NOT CHANGED    Details   amLODIPine (NORVASC) 5 mg tablet Take 5 mg by mouth daily. tamsulosin (FLOMAX) 0.4 mg capsule tamsulosin 0.4 mg capsule      Vitamin D3 25 mcg (1,000 unit) tablet       azithromycin (Zithromax Z-Giancarlo) 250 mg tablet Take 2 tablet p.o. day 1 then 1 tablet p.o. day 2 through 5  Qty: 6 Tablet, Refills: 0      albuterol (PROVENTIL HFA, VENTOLIN HFA, PROAIR HFA) 90 mcg/actuation inhaler Take 2 Puffs by inhalation every four (4) hours as needed for Wheezing.   Qty: 1 Inhaler, Refills: 0      dextromethorphan-guaiFENesin (Robitussin Cough-Chest Tim DM) 5-100 mg/5 mL liqd Take 5 mL by mouth every six (6) hours. Qty: 200 mL, Refills: 0      fluticasone furoate-vilanteroL (Breo Ellipta) 200-25 mcg/dose inhaler Take 1 Puff by inhalation daily. Qty: 60 Each, Refills: 0           2. Follow-up Information     Follow up With Specialties Details Why Contact Info    Lars Lamas MD Internal Medicine Schedule an appointment as soon as possible for a visit  As needed [facility] 4      [facility] EMERGENCY DEPT Emergency Medicine  As needed, If symptoms worsen [address] [address]  [phone]        3. Return to ED if worse   4. Current Discharge Medication List      START taking these medications    Details   ibuprofen (MOTRIN) 600 mg tablet Take 1 Tablet by mouth every six (6) hours as needed for Pain for up to 7 days. Qty: 20 Tablet, Refills: 0  Start date: 9/2/2021, End date: 9/9/2021               Diagnosis     Clinical Impression:   1. Contusion of forehead, initial encounter    2. Closed head injury, initial encounter        Attestations:    NEVAEH Paris    Please note that this dictation was completed with [email or web address], the computer voice recognition software. Quite often unanticipated grammatical, syntax, homophones, and other interpretive errors are inadvertently transcribed by the computer software. Please disregard these errors. Please excuse any errors that have escaped final proofreading. Thank you.

## 2021-09-02 NOTE — DISCHARGE INSTRUCTIONS
Thank you! Thank you for allowing me to care for you in the emergency department. I sincerely hope that you are satisfied with your visit today. It is my goal to provide you with excellent care. Below you will find a list of your labs and imaging from your visit today. Should you have any questions regarding these results please do not hesitate to call the emergency department. Labs -   No results found for this or any previous visit (from the past 12 hour(s)). Radiologic Studies -   CT HEAD WO CONT   Final Result   Sinus inflammation. No intracranial hemorrhage. CT MAXILLOFACIAL WO CONT   Final Result   No CT evidence for facial bone fracture. Sinus inflammation. CT Results  (Last 48 hours)                 09/02/21 1659  CT HEAD WO CONT Final result    Impression:  Sinus inflammation. No intracranial hemorrhage. Narrative:  CT head. Axial images are reviewed along with reformatted sagittal/coronal images. Dose reduction: All CT scans at this facility are performed using dose reduction   optimization techniques as appropriate to a performed exam including the   following-   automated exposure control, adjustments of mA and/or Kv according to patient   size, or use of iterative reconstructive technique. Mucosal wall thickening maxillary sinuses. Partial opacification ethmoid air   cells. Suspect inflammatory change. No superficial radiopaque foreign material;   however, clinical inspection could follow. Review of intracranial content reveals preserved gray-white differentiation. No   hydrocephalus. No intracranial hemorrhage. 09/02/21 1659  CT MAXILLOFACIAL WO CONT Final result    Impression:  No CT evidence for facial bone fracture. Sinus inflammation. Narrative:  CT facial bones. Axial images are reviewed along with reformatted sagittal/coronal images.     Dose reduction: All CT scans at this facility are performed using dose reduction   optimization techniques as appropriate to a performed exam including the   following-   automated exposure control, adjustments of mA and/or Kv according to patient   size, or use of iterative reconstructive technique. .       Nasal bones intact. Zygomatic arches are intact. Right frontal scalp soft tissue swelling. No definite superficial radiopaque   foreign material; however, clinical inspection could follow. Partial opacification ethmoid air cells. Nonuniform mucosal wall thickening   maxillary sinuses. Evidence for sinus inflammation. Coronal images reveal orbital margins are intact; no orbital wall fracture. Soft tissue axial images demonstrate symmetric appearance to orbital content;   globes intact. Lenses normally positioned. Sagittal images reveal mandibular condyles are normally positioned. No mandible   fracture. Normal alignment imaged upper cervical spine noting advanced cervical   spondylosis. CXR Results  (Last 48 hours)      None               If you feel that you have not received excellent quality care or timely care, please ask to speak to the nurse manager. Please choose us in the future for your continued health care needs. ------------------------------------------------------------------------------------------------------------  The exam and treatment you received in the Emergency Department were for an urgent problem and are not intended as complete care. It is important that you follow-up with a doctor, nurse practitioner, or physician assistant to:  (1) confirm your diagnosis,  (2) re-evaluation of changes in your illness and treatment, and  (3) for ongoing care. If your symptoms become worse or you do not improve as expected and you are unable to reach your usual health care provider, you should return to the Emergency Department. We are available 24 hours a day.      Please take your discharge instructions with you when you go to your follow-up appointment. If you have any problem arranging a follow-up appointment, contact the Emergency Department immediately. If a prescription has been provided, please have it filled as soon as possible to prevent a delay in treatment. Read the entire medication instruction sheet provided to you by the pharmacy. If you have any questions or reservations about taking the medication due to side effects or interactions with other medications, please call your primary care physician or contact the ER to speak with the charge nurse. Make an appointment with your family doctor or the physician you were referred to for follow-up of this visit as instructed on your discharge paperwork, as this is a mandatory follow-up. Return to the ER if you are unable to be seen or if you are unable to be seen in a timely manner. If you have any problem arranging the follow-up visit, contact the Emergency Department immediately.

## 2021-09-02 NOTE — LETTER
[facility] EMERGENCY DEPT  [address] [address]  [phone]    Work/School Note    Date: 9/2/2021    To Whom It May concern:      Seward Nissen was seen and treated today in the emergency room by the following provider(s):  Physician Assistant: NEVAEH Zuniga. Seward Nissen is excused from work/school on 09/02/21. He is clear to return to work/school on 09/04/2021.         Sincerely,          Melisa Mitchell

## 2021-09-02 NOTE — LETTER
[facility] EMERGENCY DEPT  [address] [place] [address] [place] [address]  [phone]    Work/School Note    Date: 9/2/2021    To Whom It May concern:      David Moreno was seen and treated today in the emergency room by the following provider(s):  Physician Assistant: NEVAEH Christianson. David Moreno is excused from work/school on 09/02/21. He is clear to return to work/school on 09/04/2021.         Sincerely,          Makayla Murrell

## 2021-09-02 NOTE — ED TRIAGE NOTES
At work, tripped on the floor, fell forward striking head on toolbox, hematoma and small abrasion to forehead. No LOC. No blood thinners.

## 2021-12-23 ENCOUNTER — TELEPHONE (OUTPATIENT)
Dept: ENT CLINIC | Age: 66
End: 2021-12-23

## 2021-12-23 NOTE — TELEPHONE ENCOUNTER
Attempted to contact pt to confirm appt scheduled 12/27, unable to contact pt and unable to LVM due to VM not being set up yet.

## 2021-12-27 ENCOUNTER — TELEPHONE (OUTPATIENT)
Dept: ENT CLINIC | Age: 66
End: 2021-12-27

## 2021-12-27 NOTE — TELEPHONE ENCOUNTER
Tried to call Stephanie Thacker to let them know their appointment with Dr. Jamie Briggs is cancelled and needs to be rescheduled due to Dr. Jamie Briggs being out on medical leave and was unable to leave a voicemail.

## 2022-04-07 ENCOUNTER — HOSPITAL ENCOUNTER (EMERGENCY)
Age: 67
Discharge: HOME | End: 2022-04-07
Attending: STUDENT IN AN ORGANIZED HEALTH CARE EDUCATION/TRAINING PROGRAM
Payer: MEDICARE

## 2022-04-07 VITALS
BODY MASS INDEX: 30.92 KG/M2 | SYSTOLIC BLOOD PRESSURE: 134 MMHG | DIASTOLIC BLOOD PRESSURE: 78 MMHG | RESPIRATION RATE: 16 BRPM | WEIGHT: 216 LBS | TEMPERATURE: 97.9 F | HEIGHT: 70 IN | OXYGEN SATURATION: 99 % | HEART RATE: 88 BPM

## 2022-04-07 DIAGNOSIS — M79.645 FINGER PAIN, LEFT: Primary | ICD-10-CM

## 2022-04-07 PROCEDURE — 99282 EMERGENCY DEPT VISIT SF MDM: CPT

## 2022-04-07 NOTE — ED NOTES
D/c paperwork discussed w/ and given to patient, verbalized understanding.  Seen ambulatory from department w/ steady gait noted

## 2022-04-07 NOTE — ED NOTES
Pt arrived from home with c/o Left 4th digit pain and swelling. Pt moving plywood and was stuck with something sharp. Small, non bleeding laceration/puncture (2-3 mm) on finger.   Last tetanus believed to be greater than 5 years ago      Chief Complaint   Patient presents with    Arm Pain       Past Medical History:   Diagnosis Date    Asthma     Hypertension

## 2022-04-07 NOTE — ED PROVIDER NOTES
EMERGENCY DEPARTMENT HISTORY AND PHYSICAL EXAM      Date: 4/7/2022  Patient Name: Lacho Ahuja      History of Presenting Illness     Chief Complaint   Patient presents with    Arm Pain       History Provided By: Patient    HPI: Lacho Ahuja, 74 y.o. male with COPD and hypertension presents to the ED with concerns about finger pain. Patient reports that he stuck his finger on something sharp when he was moving plywood. He does not member exactly what it is. He had pain at that time that has resolved. There is no laceration or bleeding. Mild swelling without aggravating leaving factors with no association symptoms. No insects were visualized at the location. There are no other complaints, changes, or physical findings at this time. PCP: Gilles Robb MD    Current Outpatient Medications   Medication Sig Dispense Refill    amLODIPine (NORVASC) 5 mg tablet Take 5 mg by mouth daily.  tamsulosin (FLOMAX) 0.4 mg capsule tamsulosin 0.4 mg capsule      Vitamin D3 25 mcg (1,000 unit) tablet       azithromycin (Zithromax Z-Giancarlo) 250 mg tablet Take 2 tablet p.o. day 1 then 1 tablet p.o. day 2 through 5 6 Tablet 0    albuterol (PROVENTIL HFA, VENTOLIN HFA, PROAIR HFA) 90 mcg/actuation inhaler Take 2 Puffs by inhalation every four (4) hours as needed for Wheezing. 1 Inhaler 0    dextromethorphan-guaiFENesin (Robitussin Cough-Chest Tim DM) 5-100 mg/5 mL liqd Take 5 mL by mouth every six (6) hours. 200 mL 0    fluticasone furoate-vilanteroL (Breo Ellipta) 200-25 mcg/dose inhaler Take 1 Puff by inhalation daily. 61 Each 0       Past History     Past Medical History:  Past Medical History:   Diagnosis Date    Asthma     Hypertension        Past Surgical History:  No past surgical history on file. Family History:  No family history on file.     Social History:  Social History     Tobacco Use    Smoking status: Never Smoker    Smokeless tobacco: Never Used   Substance Use Topics    Alcohol use: Not on file    Drug use: Not on file       Allergies:  No Known Allergies      Review of Systems     Review of Systems    A 10 point review of system was performed and was negative except as noted above HPI    Physical Exam     Physical Exam  Vitals and nursing note reviewed. Constitutional:       General: He is not in acute distress. Appearance: He is not ill-appearing, toxic-appearing or diaphoretic. HENT:      Head: Normocephalic and atraumatic. Cardiovascular:      Rate and Rhythm: Normal rate and regular rhythm. Heart sounds: Normal heart sounds. Pulmonary:      Effort: Pulmonary effort is normal.      Breath sounds: Normal breath sounds. Abdominal:      Palpations: Abdomen is soft. Tenderness: There is no abdominal tenderness. Musculoskeletal:      Cervical back: Normal range of motion and neck supple. Right lower leg: No tenderness. No edema. Left lower leg: No tenderness. No edema. Comments: Localized swelling distally at the fourth left finger. No tenderness to palpation. No puncture wound was appreciated. No laceration, bleeding, or induration or erythema. Range of motion is within normal.   Skin:     General: Skin is warm and dry. Neurological:      Mental Status: He is alert and oriented to person, place, and time. Medical Decision Making and ED Course   - I am the first and primary provider for this patient AND AM THE PRIMARY PROVIDER OF RECORD. - I reviewed the vital signs, available nursing notes, past medical history, past surgical history, family history and social history. - Initial assessment performed. The patients presenting problems have been discussed, and the staff are in agreement with the care plan formulated and outlined with them. I have encouraged them to ask questions as they arise throughout their visit. Vital Signs-Reviewed the patient's vital signs.     Patient Vitals for the past 24 hrs:   Temp Pulse Resp BP SpO2 04/07/22 0233  88 16 134/78 99 %   04/07/22 0026 97.9 °F (36.6 °C) 98 18 (!) 140/87 98 %       Records Reviewed: Nursing Notes    Provider Notes (Medical Decision Making):     Presents with finger pain after moving plywood. Feels that he struck his finger on something. He wanted to get checked out to make sure there is no laceration. Examination revealed mild localized swelling without any evidence of puncture wound, foreign bodies, or bleeding. No tenderness to palpation and range of motion is within normal.  I did apply ice to the area and observe patient in the ED with improvement of the swelling. No tenderness again on evaluation no development of erythema. Unlikely to be secondary to an insect bite. Will discharge with return precautions. Had an extensive discussion with the patient and his significant other about warning symptoms and that should come back if things worsen or develop new concerns. Meanwhile, supportive treatment at home. Disposition     Disposition: Condition improved  DC- Adult Discharges: All of the diagnostic tests were reviewed and questions answered. Diagnosis, care plan and treatment options were discussed. The patient understands the instructions and will follow up as directed. The patients results have been reviewed with them. They have been counseled regarding their diagnosis. The patient verbally convey understanding and agreement of the signs, symptoms, diagnosis, treatment and prognosis and additionally agrees to follow up as recommended with their PCP in 24 - 48 hours. They also agree with the care-plan and convey that all of their questions have been answered.   I have also put together some discharge instructions for them that include: 1) educational information regarding their diagnosis, 2) how to care for their diagnosis at home, as well a 3) list of reasons why they would want to return to the ED prior to their follow-up appointment, should their condition change. Discharged      DISCHARGE PLAN:  1. Current Discharge Medication List      CONTINUE these medications which have NOT CHANGED    Details   amLODIPine (NORVASC) 5 mg tablet Take 5 mg by mouth daily. tamsulosin (FLOMAX) 0.4 mg capsule tamsulosin 0.4 mg capsule      Vitamin D3 25 mcg (1,000 unit) tablet       azithromycin (Zithromax Z-Giancarlo) 250 mg tablet Take 2 tablet p.o. day 1 then 1 tablet p.o. day 2 through 5  Qty: 6 Tablet, Refills: 0      albuterol (PROVENTIL HFA, VENTOLIN HFA, PROAIR HFA) 90 mcg/actuation inhaler Take 2 Puffs by inhalation every four (4) hours as needed for Wheezing. Qty: 1 Inhaler, Refills: 0      dextromethorphan-guaiFENesin (Robitussin Cough-Chest Tim DM) 5-100 mg/5 mL liqd Take 5 mL by mouth every six (6) hours. Qty: 200 mL, Refills: 0      fluticasone furoate-vilanteroL (Breo Ellipta) 200-25 mcg/dose inhaler Take 1 Puff by inhalation daily. Qty: 60 Each, Refills: 0           2. Follow-up Information     Follow up With Specialties Details Why [address]    [address] [facility] EMERGENCY DEPT Emergency Medicine Go to  As needed, If symptoms worsen [address] Timmy Lake MD Internal Medicine  As needed [address]  [phone]          3. Return to ED if worse   4. Discharge Medication List as of 4/7/2022  2:29 AM          Diagnosis     Clinical Impression:   1. Finger pain, left        Attestations: Yolanda Duffy MD    Please note that this dictation was completed with [employer], the [employer] voice recognition software. Quite often unanticipated grammatical, syntax, homophones, and other interpretive errors are inadvertently transcribed by the computer software. Please disregard these errors. Please excuse any errors that have escaped final proofreading. Thank you.

## 2022-06-08 ENCOUNTER — HOSPITAL ENCOUNTER (EMERGENCY)
Age: 67
Discharge: HOME | End: 2022-06-09
Attending: STUDENT IN AN ORGANIZED HEALTH CARE EDUCATION/TRAINING PROGRAM
Payer: MEDICARE
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
## 2022-06-08 DIAGNOSIS — S50.02XA CONTUSION OF LEFT ELBOW, INITIAL ENCOUNTER: ICD-10-CM

## 2022-06-08 DIAGNOSIS — S00.03XA CONTUSION OF SCALP, INITIAL ENCOUNTER: ICD-10-CM

## 2022-06-08 DIAGNOSIS — J01.40 ACUTE NON-RECURRENT PANSINUSITIS: ICD-10-CM

## 2022-06-08 PROCEDURE — 99284 EMERGENCY DEPT VISIT MOD MDM: CPT

## 2022-06-09 ENCOUNTER — APPOINTMENT (OUTPATIENT)
Dept: CT IMAGING | Age: 67
End: 2022-06-09
Attending: STUDENT IN AN ORGANIZED HEALTH CARE EDUCATION/TRAINING PROGRAM
Payer: MEDICARE

## 2022-06-09 ENCOUNTER — APPOINTMENT (OUTPATIENT)
Dept: GENERAL RADIOLOGY | Age: 67
End: 2022-06-09
Attending: NURSE PRACTITIONER
Payer: MEDICARE

## 2022-06-09 VITALS
SYSTOLIC BLOOD PRESSURE: 142 MMHG | OXYGEN SATURATION: 95 % | HEART RATE: 94 BPM | DIASTOLIC BLOOD PRESSURE: 84 MMHG | RESPIRATION RATE: 18 BRPM | TEMPERATURE: 98.2 F

## 2022-06-09 PROBLEM — J45.909 ASTHMA: Status: ACTIVE | Noted: 2022-06-09

## 2022-06-09 PROBLEM — I10 HTN (HYPERTENSION): Status: ACTIVE | Noted: 2022-06-09

## 2022-06-09 PROCEDURE — 72125 CT NECK SPINE W/O DYE: CPT

## 2022-06-09 PROCEDURE — 74011250637 HC RX REV CODE- 250/637: Performed by: NURSE PRACTITIONER

## 2022-06-09 PROCEDURE — 70450 CT HEAD/BRAIN W/O DYE: CPT

## 2022-06-09 PROCEDURE — 73080 X-RAY EXAM OF ELBOW: CPT

## 2022-06-09 RX ORDER — DICLOFENAC SODIUM 10 MG/G
GEL TOPICAL 4 TIMES DAILY
Qty: 50 G | Refills: 0 | Status: SHIPPED | OUTPATIENT
Start: 2022-06-09

## 2022-06-09 RX ORDER — AMOXICILLIN AND CLAVULANATE POTASSIUM 875; 125 MG/1; MG/1
1 TABLET, FILM COATED ORAL 2 TIMES DAILY
Qty: 20 TABLET | Refills: 0 | Status: SHIPPED | OUTPATIENT
Start: 2022-06-09

## 2022-06-09 RX ORDER — AMOXICILLIN AND CLAVULANATE POTASSIUM 875; 125 MG/1; MG/1
1 TABLET, FILM COATED ORAL
Status: COMPLETED | OUTPATIENT
Start: 2022-06-09 | End: 2022-06-09

## 2022-06-09 RX ORDER — ALBUTEROL SULFATE 90 UG/1
2 AEROSOL, METERED RESPIRATORY (INHALATION)
Qty: 6.7 G | Refills: 0 | Status: SHIPPED | OUTPATIENT
Start: 2022-06-09

## 2022-06-09 RX ORDER — IPRATROPIUM BROMIDE AND ALBUTEROL SULFATE 2.5; .5 MG/3ML; MG/3ML
3 SOLUTION RESPIRATORY (INHALATION)
Qty: 30 NEBULE | Refills: 0 | Status: SHIPPED | OUTPATIENT
Start: 2022-06-09

## 2022-06-09 RX ADMIN — AMOXICILLIN AND CLAVULANATE POTASSIUM 1 TABLET: 875; 125 TABLET, FILM COATED ORAL at 01:50

## 2022-06-09 NOTE — ED PROVIDER NOTES
EMERGENCY DEPARTMENT HISTORY AND PHYSICAL EXAM      Date: 6/8/2022  Patient Name: Justina George    History of Presenting Illness     Chief Complaint   Patient presents with    Fall     GLLF, tripping up stairs to home over box, pt reports pain to left head with swelling noted and left elbow pain. History Provided By: Patient    HPI: Justina George, 74 y.o. male with a past medical history significant hypertension and asthma presents to the ED with cc of . Patient's wife put a box on the stairs patient tripped coming home. Patient hit his left elbow and back of his head. Patient denies LOC. Patient comes in for evaluation of elbow pain head pain. Patient denies any dizziness prior to fall. Moderate severity, no known exacerbating or relieving factors, no other associated signs and symptoms    There are no other complaints, changes, or physical findings at this time. PCP: Olivia Mayfield MD    No current facility-administered medications on file prior to encounter. Current Outpatient Medications on File Prior to Encounter   Medication Sig Dispense Refill    amLODIPine (NORVASC) 5 mg tablet Take 5 mg by mouth daily.  tamsulosin (FLOMAX) 0.4 mg capsule tamsulosin 0.4 mg capsule      Vitamin D3 25 mcg (1,000 unit) tablet       azithromycin (Zithromax Z-Giancarlo) 250 mg tablet Take 2 tablet p.o. day 1 then 1 tablet p.o. day 2 through 5 6 Tablet 0    albuterol (PROVENTIL HFA, VENTOLIN HFA, PROAIR HFA) 90 mcg/actuation inhaler Take 2 Puffs by inhalation every four (4) hours as needed for Wheezing. 1 Inhaler 0    dextromethorphan-guaiFENesin (Robitussin Cough-Chest Tim DM) 5-100 mg/5 mL liqd Take 5 mL by mouth every six (6) hours. 200 mL 0    fluticasone furoate-vilanteroL (Breo Ellipta) 200-25 mcg/dose inhaler Take 1 Puff by inhalation daily.  61 Each 0       Past History     Past Medical History:  Past Medical History:   Diagnosis Date    Asthma     Hypertension        Past Surgical History:  No past surgical history on file. Family History:  No family history on file. Social History:  Social History     Tobacco Use    Smoking status: Never Smoker    Smokeless tobacco: Never Used   Substance Use Topics    Alcohol use: Not on file    Drug use: Not on file       Allergies:  No Known Allergies      Review of Systems     Review of Systems   Constitutional: Negative for chills and fever. HENT: Negative for dental problem and sore throat. Eyes: Negative for pain and visual disturbance. Respiratory: Negative for cough and chest tightness. Cardiovascular: Negative for chest pain. Gastrointestinal: Negative for diarrhea and nausea. Genitourinary: Negative for difficulty urinating and frequency. Musculoskeletal: Positive for arthralgias. Negative for gait problem and joint swelling. Neurological: Positive for headaches. Negative for numbness. Hematological: Negative for adenopathy. Does not bruise/bleed easily. Psychiatric/Behavioral: Negative for behavioral problems and suicidal ideas. Physical Exam     Physical Exam  Constitutional:       General: He is not in acute distress. Appearance: Normal appearance. He is not ill-appearing or toxic-appearing. HENT:      Head: Normocephalic and atraumatic. Nose: Nose normal.      Mouth/Throat:      Mouth: Mucous membranes are moist.   Eyes:      Extraocular Movements: Extraocular movements intact. Pupils: Pupils are equal, round, and reactive to light. Cardiovascular:      Rate and Rhythm: Normal rate and regular rhythm. Pulmonary:      Effort: Pulmonary effort is normal.      Breath sounds: Normal breath sounds. Abdominal:      General: Bowel sounds are normal.   Musculoskeletal:         General: Normal range of motion. Cervical back: Normal range of motion and neck supple. Skin:     General: Skin is warm and dry. Capillary Refill: Capillary refill takes less than 2 seconds.    Neurological: General: No focal deficit present. Mental Status: He is alert and oriented to person, place, and time. Psychiatric:         Mood and Affect: Mood normal.         Behavior: Behavior normal.         Lab and Diagnostic Study Results     Labs -   No results found for this or any previous visit (from the past 12 hour(s)). Radiologic Studies -   @lastxrresult@  CT Results  (Last 48 hours)               06/09/22 0015  CT HEAD WO CONT Final result    Impression:  [No acute intracranial abnormality. Pansinusitis and with acute   components of sinusitis affecting multiple sinuses including the left frontal   sinus; treatment needs to be undertaken with follow-up to document resolution as   there is increased predilection for intracranial spread from frontal sinusitis. Lindy Baires HISTORY:  GLF   Dose reduction technique: All CT scans at this facility are performed using dose reduction optimization   technique as appropriate on the exam including the following: Automated exposure   control, adjustment of the MA and/or KV according to patient size and/or use of   iterative reconstructive technique. TECHNIQUE: Noncontrast CT of the cervical spine with multiplanar   reconstructions. COMPARISON: None   LIMITATIONS: None       FRACTURES: None   ALIGNMENT: Normal   VERTEBRAL BODIES: Multilevel degenerative/arthritic changes characterized by   osteophyte formation and associated endplate changes       DISC SPACES: Decreased with multiple posterior disc/osteophyte complexes most   notable at C4-5, C5-6, C6-7 and resulting in diffuse areas of spinal stenosis   and contributing to multiple levels of bilateral neural foraminal narrowing   POSTERIOR ELEMENTS: Multilevel hypertrophic facets.        SPINAL CANAL: Spinal stenosis accentuated by disc disease for instance with the   central canal narrowed down to 7 mm at C4-5 and down to 8 mm at C6-7   PARASPINAL SOFT TISSUES: Normal       OTHER: Large right thyroid nodule measures up to 4 cm and creates deviation of   the trachea to the right at the thoracic inlet. There is       IMPRESSION: Multilevel degenerative disc and degenerative arthritic changes of   the cervical spine with no acute abnormality. Multilevel bilateral neural   foraminal narrowing and multilevel spinal stenosis accentuated by disc disease]            A HIPPA compliant text regarding the findings and/or need for follow-up as   described in this report was sent to [facility] via the [employer]   application at the time of this interpretation. Narrative:  HISTORY: GLF   Dose reduction technique: All CT scans at this facility are performed using dose reduction optimization   technique as appropriate on the exam including the following: Automated exposure   control, adjustment of the MA and/or KV according to patient size and/or use of   iterative reconstructive technique. TECHNIQUE:  Head CT without contrast   COMPARISON: 10 months prior   LIMITATIONS: [None]       BRAIN: [Normal gray/white matter differentiation.] [No acute intracranial   hemorrhage, mass effect or midline shift.]   VENTRICLES: [No hydrocephalus]   EXTRA-AXIAL SPACES / SULCI: [No hemorrhages, fluid collections, or masses]   CALVARIUM/SKULL BASE: [Normal]   FACE/SINUSES: [Diffuse areas of opacity throughout the paranasal sinuses but in   particular with bubbly opacity in the left frontal sinus, the right maxillary   sinus and the right sphenoid sinus. mastoid air cells clear. SOFT TISSUES: [Normal]       OTHER: [None]           06/09/22 0015  CT SPINE CERV WO CONT Final result    Impression:  [No acute intracranial abnormality. Pansinusitis and with acute   components of sinusitis affecting multiple sinuses including the left frontal   sinus; treatment needs to be undertaken with follow-up to document resolution as   there is increased predilection for intracranial spread from frontal sinusitis. Sheila Reyes        HISTORY:  GLF Dose reduction technique: All CT scans at this facility are performed using dose reduction optimization   technique as appropriate on the exam including the following: Automated exposure   control, adjustment of the MA and/or KV according to patient size and/or use of   iterative reconstructive technique. TECHNIQUE: Noncontrast CT of the cervical spine with multiplanar   reconstructions. COMPARISON: None   LIMITATIONS: None       FRACTURES: None   ALIGNMENT: Normal   VERTEBRAL BODIES: Multilevel degenerative/arthritic changes characterized by   osteophyte formation and associated endplate changes       DISC SPACES: Decreased with multiple posterior disc/osteophyte complexes most   notable at C4-5, C5-6, C6-7 and resulting in diffuse areas of spinal stenosis   and contributing to multiple levels of bilateral neural foraminal narrowing   POSTERIOR ELEMENTS: Multilevel hypertrophic facets. SPINAL CANAL: Spinal stenosis accentuated by disc disease for instance with the   central canal narrowed down to 7 mm at C4-5 and down to 8 mm at C6-7   PARASPINAL SOFT TISSUES: Normal       OTHER: Large right thyroid nodule measures up to 4 cm and creates deviation of   the trachea to the right at the thoracic inlet. There is       IMPRESSION: Multilevel degenerative disc and degenerative arthritic changes of   the cervical spine with no acute abnormality. Multilevel bilateral neural   foraminal narrowing and multilevel spinal stenosis accentuated by disc disease]            A HIPPA compliant text regarding the findings and/or need for follow-up as   described in this report was sent to [facility] via the [employer]   application at the time of this interpretation. Narrative:  HISTORY: GLF   Dose reduction technique:    All CT scans at this facility are performed using dose reduction optimization   technique as appropriate on the exam including the following: Automated exposure   control, adjustment of the MA and/or KV according to patient size and/or use of   iterative reconstructive technique. TECHNIQUE:  Head CT without contrast   COMPARISON: 10 months prior   LIMITATIONS: [None]       BRAIN: [Normal gray/white matter differentiation.] [No acute intracranial   hemorrhage, mass effect or midline shift.]   VENTRICLES: [No hydrocephalus]   EXTRA-AXIAL SPACES / SULCI: [No hemorrhages, fluid collections, or masses]   CALVARIUM/SKULL BASE: [Normal]   FACE/SINUSES: [Diffuse areas of opacity throughout the paranasal sinuses but in   particular with bubbly opacity in the left frontal sinus, the right maxillary   sinus and the right sphenoid sinus. mastoid air cells clear. SOFT TISSUES: [Normal]       OTHER: [None]               CXR Results  (Last 48 hours)    None            Medical Decision Making   - I am the first provider for this patient. - I reviewed the vital signs, available nursing notes, past medical history, past surgical history, family history and social history. - Initial assessment performed. The patients presenting problems have been discussed, and they are in agreement with the care plan formulated and outlined with them. I have encouraged them to ask questions as they arise throughout their visit. Vital Signs-Reviewed the patient's vital signs. No data found. Records Reviewed: Nursing Notes and Old Medical Records          ED Course:          Provider Notes (Medical Decision Making):   Patient presents for ground-level fall. Differential diagnosis include contusion, sprain, fracture. Patient CT negative for any acute fracture. Patient does have significant pansinusitis on CT. Patient treated with Augmentin and given ENT for follow-up for resolution of his pansinusitis. Patient also given diclofenac gel for his left elbow pain and RICE instructions.   MDM       Procedures   Medical Decision Makingedical Decision Making  Performed by: Barbara Holley NP  PROCEDURES:  Procedures       Disposition   Disposition: DC- Adult Discharges: All of the diagnostic tests were reviewed and questions answered. Diagnosis, care plan and treatment options were discussed. The patient understands the instructions and will follow up as directed. The patients results have been reviewed with them. They have been counseled regarding their diagnosis. The patient verbally convey understanding and agreement of the signs, symptoms, diagnosis, treatment and prognosis and additionally agrees to follow up as recommended with their PCP in 24 - 48 hours. They also agree with the care-plan and convey that all of their questions have been answered. I have also put together some discharge instructions for them that include: 1) educational information regarding their diagnosis, 2) how to care for their diagnosis at home, as well a 3) list of reasons why they would want to return to the ED prior to their follow-up appointment, should their condition change. Discharged    DISCHARGE PLAN:  1. Current Discharge Medication List      CONTINUE these medications which have NOT CHANGED    Details   amLODIPine (NORVASC) 5 mg tablet Take 5 mg by mouth daily. tamsulosin (FLOMAX) 0.4 mg capsule tamsulosin 0.4 mg capsule      Vitamin D3 25 mcg (1,000 unit) tablet       azithromycin (Zithromax Z-Giancarlo) 250 mg tablet Take 2 tablet p.o. day 1 then 1 tablet p.o. day 2 through 5  Qty: 6 Tablet, Refills: 0      albuterol (PROVENTIL HFA, VENTOLIN HFA, PROAIR HFA) 90 mcg/actuation inhaler Take 2 Puffs by inhalation every four (4) hours as needed for Wheezing. Qty: 1 Inhaler, Refills: 0      dextromethorphan-guaiFENesin (Robitussin Cough-Chest Tim DM) 5-100 mg/5 mL liqd Take 5 mL by mouth every six (6) hours. Qty: 200 mL, Refills: 0      fluticasone furoate-vilanteroL (Breo Ellipta) 200-25 mcg/dose inhaler Take 1 Puff by inhalation daily. Qty: 60 Each, Refills: 0           2.    Follow-up Information Follow up With Specialties Details Why Contact Info    Greer Smith MD Otolaryngology Schedule an appointment as soon as possible for a visit   [address] [place] [address] [facility]   [phone]      Poly Kelly MD Otolaryngology   [address] [place] [address]      Fili Lake MD Internal Medicine Physician   [address]  [phone]          3. Return to ED if worse   4. Discharge Medication List as of 6/9/2022  1:53 AM      START taking these medications    Details   amoxicillin-clavulanate (Augmentin) 875-125 mg per tablet Take 1 Tablet by mouth two (2) times a day., Normal, Disp-20 Tablet, R-0      diclofenac (VOLTAREN) 1 % gel Apply  to affected area four (4) times daily. , Normal, Disp-50 g, R-0      albuterol-ipratropium (DUO-NEB) 2.5 mg-0.5 mg/3 ml nebu 3 mL by Nebulization route every six (6) hours as needed for Wheezing., Normal, Disp-30 Nebule, R-0      !! albuterol (Ventolin HFA) 90 mcg/actuation inhaler Take 2 Puffs by inhalation every four (4) hours as needed for Wheezing., Normal, Disp-6.7 g, R-0       !! - Potential duplicate medications found. Please discuss with provider. CONTINUE these medications which have NOT CHANGED    Details   amLODIPine (NORVASC) 5 mg tablet Take 5 mg by mouth daily. , Historical Med      tamsulosin (FLOMAX) 0.4 mg capsule tamsulosin 0.4 mg capsule, Historical Med      Vitamin D3 25 mcg (1,000 unit) tablet Historical Med, [place]      azithromycin (Zithromax Z-Giancarlo) 250 mg tablet Take 2 tablet p.o. day 1 then 1 tablet p.o. day 2 through 5, Normal, Disp-6 Tablet, R-0      !! albuterol (PROVENTIL HFA, VENTOLIN HFA, PROAIR HFA) 90 mcg/actuation inhaler Take 2 Puffs by inhalation every four (4) hours as needed for Wheezing., Normal, Disp-1 Inhaler, R-0      dextromethorphan-guaiFENesin (Robitussin Cough-Chest Tim DM) 5-100 mg/5 mL liqd Take 5 mL by mouth every six (6) hours., Normal, Disp-200 mL, R-0      fluticasone furoate-vilanteroL (Breo Ellipta) 200-25 mcg/dose inhaler Take 1 Puff by inhalation daily. , Normal, Disp-60 Each, R-0       !! - Potential duplicate medications found. Please discuss with provider. Diagnosis     Clinical Impression:   1. Fall from ground level    2. Contusion of left elbow, initial encounter    3. Acute non-recurrent pansinusitis    4. Contusion of scalp, initial encounter        Attestations:    Suleman Hannah NP    Please note that this dictation was completed with [place], the [employer] voice recognition software. Quite often unanticipated grammatical, syntax, homophones, and other interpretive errors are inadvertently transcribed by the computer software. Please disregard these errors. Please excuse any errors that have escaped final proofreading. Thank you.

## 2022-06-15 ENCOUNTER — OFFICE VISIT (OUTPATIENT)
Dept: ENT CLINIC | Age: 67
End: 2022-06-15
Payer: MEDICARE

## 2022-06-15 VITALS
RESPIRATION RATE: 18 BRPM | HEIGHT: 70 IN | BODY MASS INDEX: 30.92 KG/M2 | DIASTOLIC BLOOD PRESSURE: 78 MMHG | WEIGHT: 216 LBS | SYSTOLIC BLOOD PRESSURE: 140 MMHG | OXYGEN SATURATION: 97 % | HEART RATE: 74 BPM

## 2022-06-15 DIAGNOSIS — J34.2 DNS (DEVIATED NASAL SEPTUM): ICD-10-CM

## 2022-06-15 DIAGNOSIS — J32.4 CHRONIC PANSINUSITIS: Primary | ICD-10-CM

## 2022-06-15 DIAGNOSIS — J33.9 NASAL POLYPS: ICD-10-CM

## 2022-06-15 DIAGNOSIS — J45.909 UNCOMPLICATED ASTHMA, UNSPECIFIED ASTHMA SEVERITY, UNSPECIFIED WHETHER PERSISTENT: ICD-10-CM

## 2022-06-15 DIAGNOSIS — H61.23 BILATERAL IMPACTED CERUMEN: ICD-10-CM

## 2022-06-15 DIAGNOSIS — E04.2 MULTINODULAR GOITER: ICD-10-CM

## 2022-06-15 PROCEDURE — 1123F ACP DISCUSS/DSCN MKR DOCD: CPT | Performed by: OTOLARYNGOLOGY

## 2022-06-15 PROCEDURE — 69210 REMOVE IMPACTED EAR WAX UNI: CPT | Performed by: OTOLARYNGOLOGY

## 2022-06-15 PROCEDURE — G8427 DOCREV CUR MEDS BY ELIG CLIN: HCPCS | Performed by: OTOLARYNGOLOGY

## 2022-06-15 PROCEDURE — G8417 CALC BMI ABV UP PARAM F/U: HCPCS | Performed by: OTOLARYNGOLOGY

## 2022-06-15 PROCEDURE — G8753 SYS BP > OR = 140: HCPCS | Performed by: OTOLARYNGOLOGY

## 2022-06-15 PROCEDURE — G8536 NO DOC ELDER MAL SCRN: HCPCS | Performed by: OTOLARYNGOLOGY

## 2022-06-15 PROCEDURE — 99214 OFFICE O/P EST MOD 30 MIN: CPT | Performed by: OTOLARYNGOLOGY

## 2022-06-15 PROCEDURE — 1101F PT FALLS ASSESS-DOCD LE1/YR: CPT | Performed by: OTOLARYNGOLOGY

## 2022-06-15 PROCEDURE — 31231 NASAL ENDOSCOPY DX: CPT | Performed by: OTOLARYNGOLOGY

## 2022-06-15 PROCEDURE — 3017F COLORECTAL CA SCREEN DOC REV: CPT | Performed by: OTOLARYNGOLOGY

## 2022-06-15 PROCEDURE — G8754 DIAS BP LESS 90: HCPCS | Performed by: OTOLARYNGOLOGY

## 2022-06-15 PROCEDURE — G8510 SCR DEP NEG, NO PLAN REQD: HCPCS | Performed by: OTOLARYNGOLOGY

## 2022-06-15 RX ORDER — BUDESONIDE 0.5 MG/2ML
INHALANT ORAL
COMMUNITY

## 2022-06-15 RX ORDER — FLUTICASONE FUROATE AND VILANTEROL 100; 25 UG/1; UG/1
POWDER RESPIRATORY (INHALATION)
COMMUNITY

## 2022-06-15 RX ORDER — PANTOPRAZOLE SODIUM 40 MG/1
TABLET, DELAYED RELEASE ORAL
COMMUNITY

## 2022-06-15 RX ORDER — FLUTICASONE PROPIONATE 50 MCG
SPRAY, SUSPENSION (ML) NASAL
COMMUNITY

## 2022-06-15 RX ORDER — POTASSIUM CHLORIDE 750 MG/1
TABLET, FILM COATED, EXTENDED RELEASE ORAL
COMMUNITY

## 2022-06-15 RX ORDER — METOCLOPRAMIDE 10 MG/1
TABLET ORAL
COMMUNITY

## 2022-06-15 RX ORDER — OMEPRAZOLE 40 MG/1
CAPSULE, DELAYED RELEASE ORAL
COMMUNITY

## 2022-06-15 RX ORDER — BUDESONIDE AND FORMOTEROL FUMARATE DIHYDRATE 160; 4.5 UG/1; UG/1
AEROSOL RESPIRATORY (INHALATION)
COMMUNITY

## 2022-06-15 RX ORDER — LOSARTAN POTASSIUM 100 MG/1
TABLET ORAL
COMMUNITY

## 2022-06-15 RX ORDER — ALBUTEROL SULFATE 90 UG/1
AEROSOL, METERED RESPIRATORY (INHALATION)
COMMUNITY

## 2022-06-15 RX ORDER — FLUTICASONE PROPIONATE AND SALMETEROL 250; 50 UG/1; UG/1
POWDER RESPIRATORY (INHALATION)
COMMUNITY

## 2022-06-15 RX ORDER — SPIRONOLACTONE 25 MG/1
TABLET ORAL
COMMUNITY

## 2022-06-15 RX ORDER — FINASTERIDE 5 MG/1
5 TABLET, FILM COATED ORAL DAILY
COMMUNITY
Start: 2022-05-11

## 2022-06-15 RX ORDER — AMLODIPINE BESYLATE 10 MG/1
TABLET ORAL
COMMUNITY
Start: 2022-05-30

## 2022-06-15 RX ORDER — TRAMADOL HYDROCHLORIDE 50 MG/1
TABLET ORAL
COMMUNITY

## 2022-06-15 RX ORDER — PREDNISONE 10 MG/1
TABLET ORAL
Qty: 20 TABLET | Refills: 0 | Status: SHIPPED | OUTPATIENT
Start: 2022-06-15

## 2022-06-15 RX ORDER — MONTELUKAST SODIUM 10 MG/1
TABLET ORAL
COMMUNITY

## 2022-06-15 NOTE — LETTER
6/15/2022    Patient: Leanne Robledo   YOB: 1955   Date of Visit: 6/15/2022     Kandy Jackson MD  [facility] [address]  Via Fax: [phone]    Dear Kandy Jackson MD,      Thank you for referring Mr. Deepak Fabian to [facility] [address] for evaluation. My notes for this consultation are attached. If you have questions, please do not hesitate to call me. I look forward to following your patient along with you.       Sincerely,    Dm Avila MD

## 2022-06-15 NOTE — PROGRESS NOTES
Subjective:    Frank Ruvalcaba   74 y.o.   1955     Followup Visit    Location - sinus    Quality - chronic sinus, cough    Severity -  moderate    Duration - years    Timing - chronic    Context - pt with known history of chronic sinusitis and thyroid nodule followed in the past but has not been seen in a couple of years. He was recently seen in the emergency department after a fall and he had a head CT done showing his chronic sinusitis and he was recommended following back up with me. He was placed on Augmentin. He complains of chronic cough and sticky jellylike mucus    Modifying Features -antibiotics helped minimally    Associated symptoms/signs -COPD, asthma      Review of Systems  Review of Systems   Constitutional: Negative for chills and fever. HENT: Positive for congestion. Negative for ear pain, hearing loss, nosebleeds and tinnitus. Eyes: Negative for blurred vision and double vision. Respiratory: Positive for cough and sputum production. Negative for shortness of breath. Cardiovascular: Negative for chest pain and palpitations. Gastrointestinal: Negative for heartburn, nausea and vomiting. Musculoskeletal: Positive for falls, joint pain and myalgias. Negative for neck pain. Skin: Negative. Neurological: Negative for dizziness, speech change, weakness and headaches. Endo/Heme/Allergies: Negative for environmental allergies. Does not bruise/bleed easily. Psychiatric/Behavioral: Negative for memory loss. The patient does not have insomnia. Past Medical History:   Diagnosis Date    Asthma     Hypertension      Prior to Admission medications    Medication Sig Start Date End Date Taking?  Authorizing Provider   predniSONE (DELTASONE) 10 mg tablet 4 tab PO days 1-2     3 tab PO days 3-4      2 tab PO days 5-6      1 tab PO days 7-8 6/15/22  Yes Breezy Cobb MD   traMADoL (ULTRAM) 50 mg tablet tramadol 50 mg tablet   TAKE 1 - 2 TABLETS EVERY 4 - 6 HOURS AS NEEDED FOR SEVERE PAIN    Provider, Historical   spironolactone (ALDACTONE) 25 mg tablet spironolactone 25 mg tablet    Provider, Historical   potassium chloride SR (Klor-Con 10) 10 mEq tablet Klor-Con 10 mEq tablet,extended release   TAKE 1 TABLET BY MOUTH EVERY DAY    Provider, Historical   pantoprazole (PROTONIX) 40 mg tablet pantoprazole 40 mg tablet,delayed release    Provider, Historical   omeprazole (PRILOSEC) 40 mg capsule omeprazole 40 mg capsule,delayed release    Provider, Historical   montelukast (SINGULAIR) 10 mg tablet montelukast 10 mg tablet   TAKE 1 TABLET BY MOUTH EVERY DAY    Provider, Historical   mometasone-formoterol (DULERA) 100-5 mcg/actuation HFA inhaler Dulera 100 mcg-5 mcg/actuation HFA aerosol inhaler   INHALE 2 PUFFS BY MOUTH TWICE A DAY    Provider, Historical   metoclopramide HCl (REGLAN) 10 mg tablet metoclopramide 10 mg tablet    Provider, Historical   losartan (COZAAR) 100 mg tablet losartan 100 mg tablet    Provider, Historical   fluticasone furoate-vilanteroL (BREO ELLIPTA) 100-25 mcg/dose inhaler Breo Ellipta 100 mcg-25 mcg/dose powder for inhalation    Provider, Historical   fluticasone propionate (FLONASE) 50 mcg/actuation nasal spray fluticasone propionate 50 mcg/actuation nasal spray,suspension    Provider, Historical   fluticasone propion-salmeteroL (Advair Diskus) 250-50 mcg/dose diskus inhaler Advair Diskus 250 mcg-50 mcg/dose powder for inhalation    Provider, Historical   finasteride (PROSCAR) 5 mg tablet Take 5 mg by mouth daily.  5/11/22   Provider, Historical   budesonide (PULMICORT) 0.5 mg/2 mL nbsp budesonide 0.5 mg/2 mL suspension for nebulization    Provider, Historical   budesonide-formoteroL (SYMBICORT) 160-4.5 mcg/actuation HFAA Symbicort 160 mcg-4.5 mcg/actuation HFA aerosol inhaler   INHALE 2 PUFFS BY MOUTH TWICE A DAY (MORNING AND EVENING)    Provider, Historical   amLODIPine (NORVASC) 10 mg tablet TAKE 1 (ONE) TABLET AT NIGHT 5/30/22   Provider, Historical   albuterol (PROVENTIL HFA, VENTOLIN HFA, PROAIR HFA) 90 mcg/actuation inhaler albuterol sulfate HFA 90 mcg/actuation aerosol inhaler    Provider, Kristi   amoxicillin-clavulanate (Augmentin) 875-125 mg per tablet Take 1 Tablet by mouth two (2) times a day. 6/9/22   Loida Ocampo NP   diclofenac (VOLTAREN) 1 % gel Apply  to affected area four (4) times daily. 6/9/22   Loida Ocampo NP   albuterol-ipratropium (DUO-NEB) 2.5 mg-0.5 mg/3 ml nebu 3 mL by Nebulization route every six (6) hours as needed for Wheezing. 6/9/22   Loida Ocampo NP   albuterol (Ventolin HFA) 90 mcg/actuation inhaler Take 2 Puffs by inhalation every four (4) hours as needed for Wheezing. 6/9/22   Loida Ocampo NP   amLODIPine (NORVASC) 5 mg tablet Take 5 mg by mouth daily. 6/15/21   Other, MD Etta   tamsulosin (FLOMAX) 0.4 mg capsule tamsulosin 0.4 mg capsule    OtherEtta MD   Vitamin D3 25 mcg (1,000 unit) tablet  7/27/21   Etta Portillo MD   azithromycin (Zithromax Z-Giancarlo) 250 mg tablet Take 2 tablet p.o. day 1 then 1 tablet p.o. day 2 through 5 6/27/21   Hakeem Gonzalez MD   albuterol (PROVENTIL HFA, VENTOLIN HFA, PROAIR HFA) 90 mcg/actuation inhaler Take 2 Puffs by inhalation every four (4) hours as needed for Wheezing. 6/27/21   Hakeem Gonzalez MD   dextromethorphan-guaiFENesin (Robitussin Cough-Chest Tim DM) 5-100 mg/5 mL liqd Take 5 mL by mouth every six (6) hours. 6/27/21   Pipo Gray MD   fluticasone furoate-vilanteroL (Breo Ellipta) 200-25 mcg/dose inhaler Take 1 Puff by inhalation daily. 6/27/21   Hakeem Gonzalez MD            Objective:     Visit Vitals  BP (!) 140/78 (BP 1 Location: Left upper arm, BP Patient Position: Sitting, BP Cuff Size: Adult)   Pulse 74   Resp 18   Ht 5' 10\" (1.778 m)   Wt 216 lb (98 kg)   SpO2 97%   BMI 30.99 kg/m²        Physical Exam  Vitals reviewed. Constitutional:       General: He is awake. Appearance: Normal appearance. He is obese.    HENT:      Head: Normocephalic and atraumatic. Jaw: There is normal jaw occlusion. No trismus, tenderness or malocclusion. Salivary Glands: Right salivary gland is not diffusely enlarged or tender. Left salivary gland is not diffusely enlarged or tender. Right Ear: Hearing, tympanic membrane, ear canal and external ear normal. There is impacted cerumen. Left Ear: Hearing, tympanic membrane, ear canal and external ear normal. There is impacted cerumen. Ears:      Hernandez exam findings: does not lateralize. Right Rinne: AC > BC. Left Rinne: AC > BC. Nose: Septal deviation (Anterior right) present. No mucosal edema or rhinorrhea. Right Turbinates: Not enlarged, swollen or pale. Left Turbinates: Not enlarged, swollen or pale. Right Sinus: No maxillary sinus tenderness or frontal sinus tenderness. Left Sinus: No maxillary sinus tenderness or frontal sinus tenderness. Mouth/Throat:      Lips: Pink. Mouth: Mucous membranes are moist. No oral lesions. Dentition: Normal dentition. No gum lesions. Tongue: No lesions. Palate: No mass and lesions. Pharynx: Oropharynx is clear. Uvula midline. Tonsils: No tonsillar exudate. 0 on the right. 0 on the left. Eyes:      General: Vision grossly intact. Extraocular Movements: Extraocular movements intact. Right eye: No nystagmus. Left eye: No nystagmus. Pupils: Pupils are equal, round, and reactive to light. Neck:      Thyroid: No thyroid mass, thyromegaly or thyroid tenderness. Trachea: Trachea and phonation normal. No tracheal tenderness. Cardiovascular:      Rate and Rhythm: Normal rate and regular rhythm. Pulmonary:      Effort: Pulmonary effort is normal.      Breath sounds: Normal breath sounds. No stridor. No wheezing. Musculoskeletal:         General: Normal range of motion. Cervical back: Normal range of motion. No edema or erythema.    Lymphadenopathy:      Cervical: No cervical adenopathy. Skin:     General: Skin is warm and dry. Neurological:      General: No focal deficit present. Mental Status: He is alert and oriented to person, place, and time. Mental status is at baseline. Cranial Nerves: Cranial nerves are intact. Coordination: Romberg sign negative. Gait: Gait is intact. Psychiatric:         Mood and Affect: Mood normal.         Behavior: Behavior normal. Behavior is cooperative. Procedure - Removal Impacted Cerumen    Indications: cerumen impaction    Ears are examined under microscope/headlight.  bilateral ears are cleaned using otologic curette, suction, and/or alligator forceps. Procedure: Nasal endoscopy - Nasal passage is anesthetized with topical lidocaine/oxymetazoline spray. After several minutes the fiberoptic scope is used to examine the nasal cavity. Findings:    Septum anterior deviation of the right    Turbinates -edematous    Middle meatus -/mucoid discharge; bilateral nasal polyps, sphenoethmoidal    Nasopharynx -normal eustachian tubes, no adenoidal tissue    Left nasal cavity            Assessment/Plan:     Encounter Diagnoses   Name Primary?  Chronic pansinusitis Yes    Nasal polyps     Uncomplicated asthma, unspecified asthma severity, unspecified whether persistent     DNS (deviated nasal septum)     Bilateral impacted cerumen     Multinodular goiter      I reviewed his recent head CT and compared it to his CT he had in September 2021. He does have chronic sinusitis. Seems acutely worse more recently. He has nasal polyps. He has unified airway disease. Needs allergy testing. Complete the course of amoxicillin and I am adding 8 days of prednisone taper. We discussed treatment options including maximizing medical management, considering immunotherapy, considering Biologics and endoscopic sinus surgery. Follow-up after allergy testing. Ears are cleaned bilaterally.     I will check to see when he had his last thyroid ultrasound. Orders Placed This Encounter    NASAL ENDOSCOPY,DX    REMOVE IMPACTED EAR WAX    22445 - ALLERGY INTRADERMAL SKIN TESTS    traMADoL (ULTRAM) 50 mg tablet    spironolactone (ALDACTONE) 25 mg tablet    potassium chloride SR (Klor-Con 10) 10 mEq tablet    pantoprazole (PROTONIX) 40 mg tablet    omeprazole (PRILOSEC) 40 mg capsule    montelukast (SINGULAIR) 10 mg tablet    mometasone-formoterol (DULERA) 100-5 mcg/actuation HFA inhaler    metoclopramide HCl (REGLAN) 10 mg tablet    losartan (COZAAR) 100 mg tablet    fluticasone furoate-vilanteroL (BREO ELLIPTA) 100-25 mcg/dose inhaler    fluticasone propionate (FLONASE) 50 mcg/actuation nasal spray    fluticasone propion-salmeteroL (Advair Diskus) 250-50 mcg/dose diskus inhaler    finasteride (PROSCAR) 5 mg tablet    budesonide (PULMICORT) 0.5 mg/2 mL nbsp    budesonide-formoteroL (SYMBICORT) 160-4.5 mcg/actuation HFAA    amLODIPine (NORVASC) 10 mg tablet    albuterol (PROVENTIL HFA, VENTOLIN HFA, PROAIR HFA) 90 mcg/actuation inhaler    predniSONE (DELTASONE) 10 mg tablet     Follow-up and Dispositions    Routing History           Breezy Delaney MD, [address]    [address]  [facility]    [address] [address] [facility] [address]  [place], [place] [address] [place] [address]

## 2022-06-28 ENCOUNTER — OFFICE VISIT (OUTPATIENT)
Dept: ENT CLINIC | Age: 67
End: 2022-06-28
Payer: MEDICARE

## 2022-06-28 VITALS — DIASTOLIC BLOOD PRESSURE: 80 MMHG | HEART RATE: 84 BPM | OXYGEN SATURATION: 98 % | SYSTOLIC BLOOD PRESSURE: 130 MMHG

## 2022-06-28 DIAGNOSIS — J30.89 ALLERGIC RHINITIS DUE TO OTHER ALLERGIC TRIGGER, UNSPECIFIED SEASONALITY: Primary | ICD-10-CM

## 2022-06-28 LAB
ALTERNARIA TENUIS IGE, RESP1ALTN: <3
ASPERGILLUS FUMIGATUS 1: <3
BAHIA GRASS,G17A: <3
BERMUDA GRASS IGE, RESP1BERG: <3
BIRCH,TRE1: <3
CAT EPITHELIUM, IGE, CAT: 4
CLADOSPORIUM, IGE, CLAD: 5
COCKROACH,GERMAN, 670778: >11
COTTONWOOD,IGE, CTWD: <3
DOG DANDER,IGE, DOGD: <3
DUST MITE: <3
ELM,IGE, ELM: <3
ENGLISH PLANTAIN, IGE, EGPL: <3
LAMBS QUARTER, IGE, LAMQ: <3
MAPLE/BOX ELDER,TRE3: <3
MOUSE EPITHELIA, 069518: <3
OAK, IGE, OAK: <3
OTHER,OTHU: <3
PIGWEED,WEE7: <3
RAGWEED MIX IGE: <3
SYCAMORE,TRE7: <3
T057-IGE CEDAR, RED: <3
TIMOTHY GRASS IGE, RESP1TIMG: <3
WHITE ASH, ASHW1M: <3

## 2022-06-28 PROCEDURE — 95004 PERQ TESTS W/ALRGNC XTRCS: CPT | Performed by: OTOLARYNGOLOGY

## 2022-06-28 PROCEDURE — 95024 IQ TESTS W/ALLERGENIC XTRCS: CPT | Performed by: OTOLARYNGOLOGY

## 2022-07-19 ENCOUNTER — OFFICE VISIT (OUTPATIENT)
Dept: ENT CLINIC | Age: 67
End: 2022-07-19
Payer: MEDICARE

## 2022-07-19 VITALS
HEIGHT: 70 IN | HEART RATE: 100 BPM | TEMPERATURE: 97.1 F | SYSTOLIC BLOOD PRESSURE: 138 MMHG | OXYGEN SATURATION: 97 % | RESPIRATION RATE: 16 BRPM | DIASTOLIC BLOOD PRESSURE: 82 MMHG | WEIGHT: 216 LBS | BODY MASS INDEX: 30.92 KG/M2

## 2022-07-19 DIAGNOSIS — E04.2 MULTINODULAR GOITER: ICD-10-CM

## 2022-07-19 DIAGNOSIS — J30.9 ALLERGIC RHINITIS, UNSPECIFIED SEASONALITY, UNSPECIFIED TRIGGER: ICD-10-CM

## 2022-07-19 DIAGNOSIS — J33.9 NASAL POLYPS: ICD-10-CM

## 2022-07-19 DIAGNOSIS — J32.4 CHRONIC PANSINUSITIS: Primary | ICD-10-CM

## 2022-07-19 PROCEDURE — 1101F PT FALLS ASSESS-DOCD LE1/YR: CPT | Performed by: OTOLARYNGOLOGY

## 2022-07-19 PROCEDURE — G8752 SYS BP LESS 140: HCPCS | Performed by: OTOLARYNGOLOGY

## 2022-07-19 PROCEDURE — 3017F COLORECTAL CA SCREEN DOC REV: CPT | Performed by: OTOLARYNGOLOGY

## 2022-07-19 PROCEDURE — G8427 DOCREV CUR MEDS BY ELIG CLIN: HCPCS | Performed by: OTOLARYNGOLOGY

## 2022-07-19 PROCEDURE — G8754 DIAS BP LESS 90: HCPCS | Performed by: OTOLARYNGOLOGY

## 2022-07-19 PROCEDURE — G8417 CALC BMI ABV UP PARAM F/U: HCPCS | Performed by: OTOLARYNGOLOGY

## 2022-07-19 PROCEDURE — G8432 DEP SCR NOT DOC, RNG: HCPCS | Performed by: OTOLARYNGOLOGY

## 2022-07-19 PROCEDURE — G8536 NO DOC ELDER MAL SCRN: HCPCS | Performed by: OTOLARYNGOLOGY

## 2022-07-19 PROCEDURE — 1123F ACP DISCUSS/DSCN MKR DOCD: CPT | Performed by: OTOLARYNGOLOGY

## 2022-07-19 PROCEDURE — 99214 OFFICE O/P EST MOD 30 MIN: CPT | Performed by: OTOLARYNGOLOGY

## 2022-07-19 NOTE — LETTER
7/19/2022    Patient: Mercedes Arguello   YOB: 1955   Date of Visit: 7/19/2022     Mamta Orourke MD  [facility] [address]  Via Fax: [phone]    Dear Mamta Orourke MD,      Thank you for referring Mr. Janet Frank to [facility] [address] for evaluation. My notes for this consultation are attached. If you have questions, please do not hesitate to call me. I look forward to following your patient along with you.       Sincerely,    Tiana Morrell MD

## 2022-07-19 NOTE — PROGRESS NOTES
Visit Vitals  /82 (BP 1 Location: Left upper arm, BP Patient Position: Sitting, BP Cuff Size: Large adult)   Pulse 100   Temp 97.1 °F (36.2 °C) (Temporal)   Resp 16   Ht 5' 10\" (1.778 m)   Wt 216 lb (98 kg)   SpO2 97%   BMI 30.99 kg/m²     Chief Complaint   Patient presents with    Follow-up     Allergy test

## 2022-07-19 NOTE — PROGRESS NOTES
Subjective:    Lam Fletcher   69 y.o.   1955     Followup Visit    Location - sinus    Quality - chronic sinus, cough    Severity -  moderate    Duration - years    Timing - chronic    Context - pt with known history of chronic sinusitis and thyroid nodule followed in the past but has not been seen in a couple of years. He was recently seen in the emergency department after a fall and he had a head CT done showing his chronic sinusitis and he was recommended following back up with me. He was placed on Augmentin. He complains of chronic cough and sticky jellylike mucus    Modifying Features -antibiotics helped minimally    Associated symptoms/signs -COPD, asthma    7/19/2022 -follows up today after allergy testing. Symptoms remain the same. Still complains of stringy glue-like mucus    Review of Systems  Review of Systems   Constitutional: Negative for chills and fever. HENT: Positive for congestion. Negative for ear pain, hearing loss, nosebleeds and tinnitus. Eyes: Negative for blurred vision and double vision. Respiratory: Positive for cough and sputum production. Negative for shortness of breath. Cardiovascular: Negative for chest pain and palpitations. Gastrointestinal: Negative for heartburn, nausea and vomiting. Musculoskeletal: Positive for falls, joint pain and myalgias. Negative for neck pain. Skin: Negative. Neurological: Negative for dizziness, speech change, weakness and headaches. Endo/Heme/Allergies: Negative for environmental allergies. Does not bruise/bleed easily. Psychiatric/Behavioral: Negative for memory loss. The patient does not have insomnia. Past Medical History:   Diagnosis Date    Asthma     Hypertension      Prior to Admission medications    Medication Sig Start Date End Date Taking?  Authorizing Provider   traMADoL (ULTRAM) 50 mg tablet tramadol 50 mg tablet   TAKE 1 - 2 TABLETS EVERY 4 - 6 HOURS AS NEEDED FOR SEVERE PAIN   Yes Provider, Historical   spironolactone (ALDACTONE) 25 mg tablet spironolactone 25 mg tablet   Yes Provider, Historical   potassium chloride SR (Klor-Con 10) 10 mEq tablet Klor-Con 10 mEq tablet,extended release   TAKE 1 TABLET BY MOUTH EVERY DAY   Yes Provider, Historical   pantoprazole (PROTONIX) 40 mg tablet pantoprazole 40 mg tablet,delayed release   Yes Provider, Historical   omeprazole (PRILOSEC) 40 mg capsule omeprazole 40 mg capsule,delayed release   Yes Provider, Historical   montelukast (SINGULAIR) 10 mg tablet montelukast 10 mg tablet   TAKE 1 TABLET BY MOUTH EVERY DAY   Yes Provider, Historical   mometasone-formoterol (DULERA) 100-5 mcg/actuation HFA inhaler Dulera 100 mcg-5 mcg/actuation HFA aerosol inhaler   INHALE 2 PUFFS BY MOUTH TWICE A DAY   Yes Provider, Historical   metoclopramide HCl (REGLAN) 10 mg tablet metoclopramide 10 mg tablet   Yes Provider, Historical   losartan (COZAAR) 100 mg tablet losartan 100 mg tablet   Yes Provider, Historical   fluticasone furoate-vilanteroL (BREO ELLIPTA) 100-25 mcg/dose inhaler Breo Ellipta 100 mcg-25 mcg/dose powder for inhalation   Yes Provider, Historical   fluticasone propionate (FLONASE) 50 mcg/actuation nasal spray fluticasone propionate 50 mcg/actuation nasal spray,suspension   Yes Provider, Historical   fluticasone propion-salmeteroL (Advair Diskus) 250-50 mcg/dose diskus inhaler Advair Diskus 250 mcg-50 mcg/dose powder for inhalation   Yes Provider, Historical   finasteride (PROSCAR) 5 mg tablet Take 5 mg by mouth daily.  5/11/22  Yes Provider, Historical   budesonide (PULMICORT) 0.5 mg/2 mL nbsp budesonide 0.5 mg/2 mL suspension for nebulization   Yes Provider, Historical   budesonide-formoteroL (SYMBICORT) 160-4.5 mcg/actuation HFAA Symbicort 160 mcg-4.5 mcg/actuation HFA aerosol inhaler   INHALE 2 PUFFS BY MOUTH TWICE A DAY (MORNING AND EVENING)   Yes Provider, Historical   amLODIPine (NORVASC) 10 mg tablet TAKE 1 (ONE) TABLET AT NIGHT 5/30/22  Yes Provider, Historical   albuterol (PROVENTIL HFA, VENTOLIN HFA, PROAIR HFA) 90 mcg/actuation inhaler albuterol sulfate HFA 90 mcg/actuation aerosol inhaler   Yes Provider, Historical   predniSONE (DELTASONE) 10 mg tablet 4 tab PO days 1-2     3 tab PO days 3-4      2 tab PO days 5-6      1 tab PO days 7-8 6/15/22  Yes Christine Cobb MD   amoxicillin-clavulanate (Augmentin) 875-125 mg per tablet Take 1 Tablet by mouth two (2) times a day. 6/9/22  Yes Tanner Huertas NP   diclofenac (VOLTAREN) 1 % gel Apply  to affected area four (4) times daily. 6/9/22  Yes Tanner Huertas NP   albuterol-ipratropium (DUO-NEB) 2.5 mg-0.5 mg/3 ml nebu 3 mL by Nebulization route every six (6) hours as needed for Wheezing. 6/9/22  Yes Tanner Huertas NP   albuterol (Ventolin HFA) 90 mcg/actuation inhaler Take 2 Puffs by inhalation every four (4) hours as needed for Wheezing. 6/9/22  Yes Tanner Huertas NP   amLODIPine (NORVASC) 5 mg tablet Take 5 mg by mouth daily. 6/15/21  Yes Bandar, MD Etta   tamsulosin (FLOMAX) 0.4 mg capsule tamsulosin 0.4 mg capsule   Yes Other, MD Etta   Vitamin D3 25 mcg (1,000 unit) tablet  7/27/21  Yes Bandar, MD Etta   azithromycin (Zithromax Z-Giancarlo) 250 mg tablet Take 2 tablet p.o. day 1 then 1 tablet p.o. day 2 through 5 6/27/21  Yes Peterson Torres MD   albuterol (PROVENTIL HFA, VENTOLIN HFA, PROAIR HFA) 90 mcg/actuation inhaler Take 2 Puffs by inhalation every four (4) hours as needed for Wheezing. 6/27/21  Yes Peterson Torres MD   dextromethorphan-guaiFENesin (Robitussin Cough-Chest Tim DM) 5-100 mg/5 mL liqd Take 5 mL by mouth every six (6) hours. 6/27/21  Yes Peterson Torres MD   fluticasone furoate-vilanteroL (Breo Ellipta) 200-25 mcg/dose inhaler Take 1 Puff by inhalation daily.  6/27/21  Yes Peterson Torres MD            Objective:     Visit Vitals  /82 (BP 1 Location: Left upper arm, BP Patient Position: Sitting, BP Cuff Size: Large adult)   Pulse 100   Temp 97.1 °F (36.2 °C) (Temporal)   Resp 16   Ht 5' 10\" (1.778 m)   Wt 216 lb (98 kg)   SpO2 97%   BMI 30.99 kg/m²        Physical Exam  Vitals reviewed. Constitutional:       General: He is awake. Appearance: Normal appearance. He is obese. HENT:      Head: Normocephalic and atraumatic. Jaw: There is normal jaw occlusion. No trismus, tenderness or malocclusion. Salivary Glands: Right salivary gland is not diffusely enlarged or tender. Left salivary gland is not diffusely enlarged or tender. Right Ear: Hearing, tympanic membrane, ear canal and external ear normal.      Left Ear: Hearing, tympanic membrane, ear canal and external ear normal. There is no impacted cerumen. Nose: Septal deviation (Anterior right) present. No mucosal edema or rhinorrhea. Right Turbinates: Not enlarged, swollen or pale. Left Turbinates: Not enlarged, swollen or pale. Right Sinus: No maxillary sinus tenderness or frontal sinus tenderness. Left Sinus: No maxillary sinus tenderness or frontal sinus tenderness. Mouth/Throat:      Lips: Pink. Mouth: Mucous membranes are moist. No oral lesions. Dentition: Normal dentition. No gum lesions. Tongue: No lesions. Palate: No mass and lesions. Pharynx: Oropharynx is clear. Uvula midline. Tonsils: No tonsillar exudate. 0 on the right. 0 on the left. Eyes:      General: Vision grossly intact. Extraocular Movements: Extraocular movements intact. Right eye: No nystagmus. Left eye: No nystagmus. Pupils: Pupils are equal, round, and reactive to light. Neck:      Thyroid: No thyroid mass, thyromegaly or thyroid tenderness. Trachea: Trachea and phonation normal. No tracheal tenderness. Cardiovascular:      Rate and Rhythm: Normal rate and regular rhythm. Pulmonary:      Effort: Pulmonary effort is normal.      Breath sounds: Normal breath sounds. No stridor. No wheezing. Musculoskeletal:         General: Normal range of motion. Cervical back: Normal range of motion. No edema or erythema. Lymphadenopathy:      Cervical: No cervical adenopathy. Skin:     General: Skin is warm and dry. Neurological:      General: No focal deficit present. Mental Status: He is alert and oriented to person, place, and time. Mental status is at baseline. Cranial Nerves: Cranial nerves are intact. Coordination: Romberg sign negative. Gait: Gait is intact. Psychiatric:         Mood and Affect: Mood normal.         Behavior: Behavior normal. Behavior is cooperative. Prior nasal endoscopy  Left nasal cavity          Assessment/Plan:     Encounter Diagnoses   Name Primary?  Chronic pansinusitis Yes    Nasal polyps     Multinodular goiter     Allergic rhinitis, unspecified seasonality, unspecified trigger      He has unified airway disease with chronic sinusitis with nasal polyps, COPD/asthma. His allergy testing showed mild allergy to dust moderate to mold, moderate to severe to cockroach. However overall his allergies were limited and I do not think he would be a good candidate for immunotherapy at this time. We discussed treatment options including maximizing medical management, considering immunotherapy, considering Biologics and endoscopic sinus surgery. We will do a updated sinus CT which will help assess the next best step for him. He has not had thyroid ultrasound in over a year we will order a new one to monitor his multinodular goiter. Follow-up after imaging. Orders Placed This Encounter    US THYROID/PARATHYROID/SOFT TISS    CT MAXILLOFACIAL WO CONT           Breezy Flores MD, [address] [facility]    [address]  [facility]    [address] [address]  [place], [place] [address] [place] [address]

## 2022-08-04 ENCOUNTER — HOSPITAL ENCOUNTER (OUTPATIENT)
Dept: ULTRASOUND IMAGING | Age: 67
Discharge: HOME | End: 2022-08-04
Attending: OTOLARYNGOLOGY
Payer: MEDICARE

## 2022-08-04 ENCOUNTER — HOSPITAL ENCOUNTER (OUTPATIENT)
Dept: CT IMAGING | Age: 67
Discharge: HOME | End: 2022-08-04
Attending: OTOLARYNGOLOGY
Payer: MEDICARE

## 2022-08-04 DIAGNOSIS — J33.9 NASAL POLYPS: ICD-10-CM

## 2022-08-04 DIAGNOSIS — E04.2 MULTINODULAR GOITER: ICD-10-CM

## 2022-08-04 DIAGNOSIS — J32.4 CHRONIC PANSINUSITIS: ICD-10-CM

## 2022-08-04 PROCEDURE — 76536 US EXAM OF HEAD AND NECK: CPT

## 2022-08-04 PROCEDURE — 70486 CT MAXILLOFACIAL W/O DYE: CPT

## 2022-08-11 DIAGNOSIS — E04.2 MULTINODULAR GOITER: Primary | ICD-10-CM

## 2022-08-11 NOTE — PROGRESS NOTES
Spoke with pt's wife and advised her of message about thyroid FNA to be scheduled and CT results with options. Pt's wife stated she would need to call me back, because she would have questions about the results.

## 2022-09-17 LAB
CREATININE, EXTERNAL: 0.84
HBA1C MFR BLD HPLC: 5.9 %
LDL-C, EXTERNAL: 109
PSA, EXTERNAL: 2.7
TOTAL CHOLESTEROL, NCHOLT: 179

## 2022-09-19 ENCOUNTER — TELEPHONE (OUTPATIENT)
Dept: ENT CLINIC | Age: 67
End: 2022-09-19

## 2022-09-19 NOTE — TELEPHONE ENCOUNTER
[facility] called stating that they have been trying to reach this pt to schedule a thyroid biopsy and have called several times but have been unable to reach him or leave a voicemail due to no voicemail box being set up. They confirmed the phone that they had with me and stated that they would keep trying to get in contact with him but they wanted to let Dr. Leigha Bacon that at this time the biopsy has not been scheduled/completed due to being unable to contact patient.

## 2022-09-30 ENCOUNTER — TRANSCRIBE ORDER (OUTPATIENT)
Dept: SCHEDULING | Age: 67
End: 2022-09-30

## 2022-09-30 DIAGNOSIS — R91.1 NODULE OF RIGHT LUNG: Primary | ICD-10-CM

## 2022-10-30 DIAGNOSIS — J45.909 UNSPECIFIED ASTHMA, UNCOMPLICATED: ICD-10-CM

## 2022-10-30 DIAGNOSIS — J45.909 ASTHMA: ICD-10-CM

## 2022-10-30 RX ORDER — ALBUTEROL SULFATE 90 UG/1
AEROSOL, METERED RESPIRATORY (INHALATION)
Qty: 8.5 EACH | Refills: 1 | Status: SHIPPED | OUTPATIENT
Start: 2022-10-30

## 2022-11-02 ENCOUNTER — HOSPITAL ENCOUNTER (OUTPATIENT)
Dept: INTERVENTIONAL RADIOLOGY/VASCULAR | Age: 67
Discharge: HOME | End: 2022-11-02
Attending: OTOLARYNGOLOGY
Payer: MEDICARE

## 2022-11-02 DIAGNOSIS — E04.1 RIGHT THYROID NODULE: ICD-10-CM

## 2022-11-02 PROCEDURE — 88173 CYTOPATH EVAL FNA REPORT: CPT

## 2022-11-02 PROCEDURE — 10007 FNA BX W/FLUOR GDN 1ST LES: CPT

## 2022-11-02 PROCEDURE — 88172 CYTP DX EVAL FNA 1ST EA SITE: CPT

## 2022-11-03 NOTE — PROGRESS NOTES
Pls inform pt his thyroid biopsy is benign. He needs to make a followup appointment to discuss his sinus CT and next steps. Thanks.

## 2022-11-05 DIAGNOSIS — J45.909 ASTHMA: ICD-10-CM

## 2022-11-05 DIAGNOSIS — J45.20 MILD INTERMITTENT ASTHMA, UNCOMPLICATED: ICD-10-CM

## 2022-11-05 RX ORDER — IPRATROPIUM BROMIDE AND ALBUTEROL SULFATE 2.5; .5 MG/3ML; MG/3ML
SOLUTION RESPIRATORY (INHALATION)
Qty: 360 ML | Refills: 1 | Status: SHIPPED | OUTPATIENT
Start: 2022-11-05

## 2022-11-08 ENCOUNTER — HOSPITAL ENCOUNTER (OUTPATIENT)
Dept: CT IMAGING | Age: 67
Discharge: HOME | End: 2022-11-08
Attending: INTERNAL MEDICINE
Payer: MEDICARE

## 2022-11-08 DIAGNOSIS — R91.1 NODULE OF RIGHT LUNG: ICD-10-CM

## 2022-11-08 PROCEDURE — 71250 CT THORAX DX C-: CPT

## 2022-11-10 ENCOUNTER — TELEPHONE (OUTPATIENT)
Dept: ENT CLINIC | Age: 67
End: 2022-11-10

## 2022-11-13 NOTE — PROGRESS NOTES
Let him know CT chest shows stable lung nodules,arthritis in the back and has large right thyroid nodule.  To keep the appointment with Dr Tiffani Villegas on 12/6/22 and will recheck CT chest in 6 months

## 2022-11-24 NOTE — TELEPHONE ENCOUNTER
Let him know CT chest shows stable lung nodules,arthritis in the back and has large right thyroid nodule.  To keep the appointment with Dr Kelli Lama on 12/6/22 and will recheck CT chest in 6 months

## 2022-12-01 NOTE — TELEPHONE ENCOUNTER
Called patient and spoke to patient as well as wife and gave them CT scan results. Advised to keep appointment on 12/6 with Dr. Kelsi Pitts

## 2022-12-06 ENCOUNTER — OFFICE VISIT (OUTPATIENT)
Dept: ENT CLINIC | Age: 67
End: 2022-12-06
Payer: MEDICARE

## 2022-12-06 VITALS
HEART RATE: 91 BPM | HEIGHT: 70 IN | OXYGEN SATURATION: 97 % | BODY MASS INDEX: 30.92 KG/M2 | SYSTOLIC BLOOD PRESSURE: 122 MMHG | WEIGHT: 216 LBS | RESPIRATION RATE: 18 BRPM | DIASTOLIC BLOOD PRESSURE: 80 MMHG

## 2022-12-06 DIAGNOSIS — E04.2 MULTINODULAR GOITER: ICD-10-CM

## 2022-12-06 DIAGNOSIS — J32.4 CHRONIC PANSINUSITIS: Primary | ICD-10-CM

## 2022-12-06 DIAGNOSIS — J33.9 NASAL POLYPS: ICD-10-CM

## 2022-12-06 PROCEDURE — G8536 NO DOC ELDER MAL SCRN: HCPCS | Performed by: OTOLARYNGOLOGY

## 2022-12-06 PROCEDURE — G8427 DOCREV CUR MEDS BY ELIG CLIN: HCPCS | Performed by: OTOLARYNGOLOGY

## 2022-12-06 PROCEDURE — G8754 DIAS BP LESS 90: HCPCS | Performed by: OTOLARYNGOLOGY

## 2022-12-06 PROCEDURE — 1123F ACP DISCUSS/DSCN MKR DOCD: CPT | Performed by: OTOLARYNGOLOGY

## 2022-12-06 PROCEDURE — 1101F PT FALLS ASSESS-DOCD LE1/YR: CPT | Performed by: OTOLARYNGOLOGY

## 2022-12-06 PROCEDURE — 3078F DIAST BP <80 MM HG: CPT | Performed by: OTOLARYNGOLOGY

## 2022-12-06 PROCEDURE — G8417 CALC BMI ABV UP PARAM F/U: HCPCS | Performed by: OTOLARYNGOLOGY

## 2022-12-06 PROCEDURE — 99214 OFFICE O/P EST MOD 30 MIN: CPT | Performed by: OTOLARYNGOLOGY

## 2022-12-06 PROCEDURE — 3074F SYST BP LT 130 MM HG: CPT | Performed by: OTOLARYNGOLOGY

## 2022-12-06 PROCEDURE — 3017F COLORECTAL CA SCREEN DOC REV: CPT | Performed by: OTOLARYNGOLOGY

## 2022-12-06 PROCEDURE — G8510 SCR DEP NEG, NO PLAN REQD: HCPCS | Performed by: OTOLARYNGOLOGY

## 2022-12-06 PROCEDURE — G8752 SYS BP LESS 140: HCPCS | Performed by: OTOLARYNGOLOGY

## 2022-12-06 RX ORDER — DUPILUMAB 300 MG/2ML
300 INJECTION, SOLUTION SUBCUTANEOUS
Qty: 4 ML | Refills: 3 | Status: SHIPPED | OUTPATIENT
Start: 2022-12-06

## 2022-12-06 NOTE — LETTER
12/6/2022    Patient: Chivo Coffman   YOB: 1955   Date of Visit: 12/6/2022     Cherylene Landau, MD  [facility] [address]  Via In Basket    Dear Cherylene Landau, MD,      Thank you for referring Mr. Kavitha Tan to [facility] [address] for evaluation. My notes for this consultation are attached. If you have questions, please do not hesitate to call me. I look forward to following your patient along with you.       Sincerely,    Gareth Christopher MD

## 2022-12-06 NOTE — PROGRESS NOTES
Subjective:    Ab Jones   69 y.o.   1955     Followup Visit    Location - sinus    Quality - chronic sinus, cough    Severity -  moderate    Duration - years    Timing - chronic    Context - pt with known history of chronic sinusitis and thyroid nodule followed in the past but has not been seen in a couple of years. He was recently seen in the emergency department after a fall and he had a head CT done showing his chronic sinusitis and he was recommended following back up with me. He was placed on Augmentin. He complains of chronic cough and sticky jellylike mucus    Modifying Features -antibiotics helped minimally    Associated symptoms/signs -COPD, asthma    7/19/2022 -follows up today after allergy testing. Symptoms remain the same. Still complains of stringy glue-like mucus    12/6/22  5 mos fu -pt had his thyroid bx, still having some cough. He also had a chest CT       Review of Systems  Review of Systems   Constitutional:  Negative for chills and fever. HENT:  Positive for congestion. Negative for ear pain, hearing loss, nosebleeds and tinnitus. Eyes:  Negative for blurred vision and double vision. Respiratory:  Positive for cough and sputum production. Negative for shortness of breath. Cardiovascular:  Negative for chest pain and palpitations. Gastrointestinal:  Negative for heartburn, nausea and vomiting. Musculoskeletal:  Positive for falls, joint pain and myalgias. Negative for neck pain. Skin: Negative. Neurological:  Negative for dizziness, speech change, weakness and headaches. Endo/Heme/Allergies:  Negative for environmental allergies. Does not bruise/bleed easily. Psychiatric/Behavioral:  Negative for memory loss. The patient does not have insomnia. Past Medical History:   Diagnosis Date    Asthma     Hypertension      Prior to Admission medications    Medication Sig Start Date End Date Taking?  Authorizing Provider   dupilumab (Dupixent Syringe) 300 mg/2 mL syrg syringe 2 mL by SubCUTAneous route every fourteen (14) days.  12/6/22  Yes Sarah Garcia MD   albuterol-ipratropium (DUO-NEB) 2.5 mg-0.5 mg/3 ml nebu USE 1 VAIL IN NEBULIZER 4 TIMES A DAY 11/5/22   Aby Crystal MD   albuterol (PROVENTIL HFA, VENTOLIN HFA, PROAIR HFA) 90 mcg/actuation inhaler INHALE 1 PUFF EVERY 6 HOURS AS NEEDED 10/30/22   Aby Crystal MD   traMADoL (ULTRAM) 50 mg tablet tramadol 50 mg tablet   TAKE 1 - 2 TABLETS EVERY 4 - 6 HOURS AS NEEDED FOR SEVERE PAIN    Provider, Historical   spironolactone (ALDACTONE) 25 mg tablet spironolactone 25 mg tablet    Provider, Historical   potassium chloride SR (Klor-Con 10) 10 mEq tablet Klor-Con 10 mEq tablet,extended release   TAKE 1 TABLET BY MOUTH EVERY DAY    Provider, Historical   pantoprazole (PROTONIX) 40 mg tablet pantoprazole 40 mg tablet,delayed release    Provider, Historical   omeprazole (PRILOSEC) 40 mg capsule omeprazole 40 mg capsule,delayed release    Provider, Historical   montelukast (SINGULAIR) 10 mg tablet montelukast 10 mg tablet   TAKE 1 TABLET BY MOUTH EVERY DAY    Provider, Historical   mometasone-formoterol (DULERA) 100-5 mcg/actuation HFA inhaler Dulera 100 mcg-5 mcg/actuation HFA aerosol inhaler   INHALE 2 PUFFS BY MOUTH TWICE A DAY    Provider, Historical   metoclopramide HCl (REGLAN) 10 mg tablet metoclopramide 10 mg tablet    Provider, Historical   losartan (COZAAR) 100 mg tablet losartan 100 mg tablet    Provider, Historical   fluticasone furoate-vilanteroL (BREO ELLIPTA) 100-25 mcg/dose inhaler Breo Ellipta 100 mcg-25 mcg/dose powder for inhalation    Provider, Historical   fluticasone propionate (FLONASE) 50 mcg/actuation nasal spray fluticasone propionate 50 mcg/actuation nasal spray,suspension    Provider, Historical   fluticasone propion-salmeteroL (Advair Diskus) 250-50 mcg/dose diskus inhaler Advair Diskus 250 mcg-50 mcg/dose powder for inhalation    Provider, Historical   finasteride (PROSCAR) 5 mg tablet Take 5 mg by mouth daily. 5/11/22   Provider, Historical   budesonide (PULMICORT) 0.5 mg/2 mL nbsp budesonide 0.5 mg/2 mL suspension for nebulization    Provider, Historical   budesonide-formoteroL (SYMBICORT) 160-4.5 mcg/actuation HFAA Symbicort 160 mcg-4.5 mcg/actuation HFA aerosol inhaler   INHALE 2 PUFFS BY MOUTH TWICE A DAY (MORNING AND EVENING)    Provider, Historical   amLODIPine (NORVASC) 10 mg tablet TAKE 1 (ONE) TABLET AT NIGHT 5/30/22   Provider, Historical   albuterol (PROVENTIL HFA, VENTOLIN HFA, PROAIR HFA) 90 mcg/actuation inhaler albuterol sulfate HFA 90 mcg/actuation aerosol inhaler    Provider, Historical   predniSONE (DELTASONE) 10 mg tablet 4 tab PO days 1-2     3 tab PO days 3-4      2 tab PO days 5-6      1 tab PO days 7-8 6/15/22   Cruz Carrington MD   amoxicillin-clavulanate (Augmentin) 875-125 mg per tablet Take 1 Tablet by mouth two (2) times a day. 6/9/22   Geraline Rump, NP   diclofenac (VOLTAREN) 1 % gel Apply  to affected area four (4) times daily. 6/9/22   Geraline Rump, NP   albuterol (Ventolin HFA) 90 mcg/actuation inhaler Take 2 Puffs by inhalation every four (4) hours as needed for Wheezing. 6/9/22   Geraline Rump, NP   amLODIPine (NORVASC) 5 mg tablet Take 5 mg by mouth daily. 6/15/21   Other, MD Etta   tamsulosin (FLOMAX) 0.4 mg capsule tamsulosin 0.4 mg capsule    Other, MD Etta   Vitamin D3 25 mcg (1,000 unit) tablet  7/27/21   Bandar, MD Etta   azithromycin (Zithromax Z-Giancarlo) 250 mg tablet Take 2 tablet p.o. day 1 then 1 tablet p.o. day 2 through 5 6/27/21   Neisha Hall MD   albuterol (PROVENTIL HFA, VENTOLIN HFA, PROAIR HFA) 90 mcg/actuation inhaler Take 2 Puffs by inhalation every four (4) hours as needed for Wheezing. 6/27/21   Neisha Hall MD   dextromethorphan-guaiFENesin (Robitussin Cough-Chest Tim DM) 5-100 mg/5 mL liqd Take 5 mL by mouth every six (6) hours.  6/27/21   Todd Gray MD   fluticasone furoate-vilanteroL (Breo Ellipta) 200-25 mcg/dose inhaler Take 1 Puff by inhalation daily. 6/27/21   Livier Fu MD            Objective:     Visit Vitals  /80 (BP 1 Location: Left upper arm, BP Patient Position: Sitting, BP Cuff Size: Adult)   Pulse 91   Resp 18   Ht 5' 10\" (1.778 m)   Wt 216 lb (98 kg)   SpO2 97%   BMI 30.99 kg/m²        Physical Exam  Vitals reviewed. Constitutional:       General: He is awake. Appearance: Normal appearance. He is obese. HENT:      Head: Normocephalic and atraumatic. Jaw: There is normal jaw occlusion. No trismus, tenderness or malocclusion. Salivary Glands: Right salivary gland is not diffusely enlarged or tender. Left salivary gland is not diffusely enlarged or tender. Right Ear: Hearing, tympanic membrane, ear canal and external ear normal.      Left Ear: Hearing, tympanic membrane, ear canal and external ear normal. There is no impacted cerumen. Nose: Septal deviation (Anterior right) present. No mucosal edema or rhinorrhea. Right Turbinates: Not enlarged, swollen or pale. Left Turbinates: Not enlarged, swollen or pale. Right Sinus: No maxillary sinus tenderness or frontal sinus tenderness. Left Sinus: No maxillary sinus tenderness or frontal sinus tenderness. Mouth/Throat:      Lips: Pink. Mouth: Mucous membranes are moist. No oral lesions. Dentition: Normal dentition. No gum lesions. Tongue: No lesions. Palate: No mass and lesions. Pharynx: Oropharynx is clear. Uvula midline. Tonsils: No tonsillar exudate. 0 on the right. 0 on the left. Eyes:      General: Vision grossly intact. Extraocular Movements: Extraocular movements intact. Right eye: No nystagmus. Left eye: No nystagmus. Pupils: Pupils are equal, round, and reactive to light. Neck:      Thyroid: No thyroid mass, thyromegaly or thyroid tenderness. Trachea: Trachea and phonation normal. No tracheal tenderness.    Cardiovascular:      Rate and Rhythm: Normal rate and regular rhythm. Pulmonary:      Effort: Pulmonary effort is normal.      Breath sounds: Normal breath sounds. No stridor. No wheezing. Musculoskeletal:         General: Normal range of motion. Cervical back: Normal range of motion. No edema or erythema. Lymphadenopathy:      Cervical: No cervical adenopathy. Skin:     General: Skin is warm and dry. Neurological:      General: No focal deficit present. Mental Status: He is alert and oriented to person, place, and time. Mental status is at baseline. Coordination: Romberg sign negative. Gait: Gait is intact. Psychiatric:         Mood and Affect: Mood normal.         Behavior: Behavior normal. Behavior is cooperative. Prior nasal endoscopy  Left nasal cavity          Assessment/Plan:     Encounter Diagnoses   Name Primary? Chronic pansinusitis Yes    Nasal polyps     Multinodular goiter      He has unified airway disease with chronic sinusitis with nasal polyps, COPD/asthma. His allergy testing showed mild allergy to dust moderate to mold, moderate to severe to cockroach. However overall his allergies were limited and I do not think he would be a good candidate for immunotherapy at this time. We discussed treatment options including maximizing medical management, considering immunotherapy, considering Biologics and endoscopic sinus surgery. We reviewed his CT scan images together and he does have pansinusitis with nasal polyps. He wants to consider Dupixent. We will put a prescription in for this. His thyroid ultrasound showed a dominant right thyroid nodule, recent FNA is benign. This can be monitored. Orders Placed This Encounter    dupilumab (Dupixent Syringe) 300 mg/2 mL syrg syringe       Follow-up and Dispositions    Return in about 3 months (around 3/6/2023). Breezy Frank MD, [address] [facility]    [address] [place] [address] [address]  [address] [facility] Elaine Orellana    [facility] [address], [address] [facility]    [address] [facility]

## 2022-12-13 ENCOUNTER — TELEPHONE (OUTPATIENT)
Dept: ENT CLINIC | Age: 67
End: 2022-12-13

## 2022-12-13 RX ORDER — BENZONATATE 200 MG/1
200 CAPSULE ORAL
Qty: 60 CAPSULE | Refills: 1 | Status: SHIPPED | OUTPATIENT
Start: 2022-12-13 | End: 2022-12-20

## 2022-12-13 NOTE — TELEPHONE ENCOUNTER
Wife called about his Dupixent. [facility] said it would have to be ordered. Have you received a PA for this? He is coughing day/night. I sent a message to Dr. Carmina Brown for something for the cough.  Marcos

## 2022-12-23 ENCOUNTER — TELEPHONE (OUTPATIENT)
Dept: ENT CLINIC | Age: 67
End: 2022-12-23

## 2022-12-23 NOTE — TELEPHONE ENCOUNTER
Jessica Tinsley from [place] [address] called needing clarification for this pt prescription.     Jessica Tinsley stated she can be reached at:  [phone]    Please advise

## 2023-01-06 ENCOUNTER — TELEPHONE (OUTPATIENT)
Dept: ENT CLINIC | Age: 68
End: 2023-01-06

## 2023-01-13 DIAGNOSIS — I10 ESSENTIAL (PRIMARY) HYPERTENSION: ICD-10-CM

## 2023-01-13 DIAGNOSIS — I10 BENIGN ESSENTIAL HYPERTENSION: ICD-10-CM

## 2023-01-13 RX ORDER — AMLODIPINE BESYLATE 10 MG/1
TABLET ORAL
Qty: 90 TABLET | Refills: 1 | Status: SHIPPED | OUTPATIENT
Start: 2023-01-13

## 2023-01-14 PROBLEM — E55.9 VITAMIN D DEFICIENCY: Status: ACTIVE | Noted: 2023-01-14

## 2023-01-14 PROBLEM — D69.6 THROMBOCYTOPENIA (HCC): Status: ACTIVE | Noted: 2023-01-14

## 2023-01-14 PROBLEM — E66.811 OBESITY (BMI 30.0-34.9): Status: ACTIVE | Noted: 2023-01-14

## 2023-01-14 PROBLEM — K21.9 GERD (GASTROESOPHAGEAL REFLUX DISEASE): Status: ACTIVE | Noted: 2023-01-14

## 2023-01-14 PROBLEM — J47.9 BRONCHIECTASIS WITHOUT COMPLICATION (HCC): Status: ACTIVE | Noted: 2023-01-14

## 2023-01-14 PROBLEM — E78.00 HYPERCHOLESTEROLEMIA: Status: ACTIVE | Noted: 2023-01-14

## 2023-01-14 PROBLEM — D72.10 EOSINOPHILIA: Status: ACTIVE | Noted: 2023-01-14

## 2023-01-14 PROBLEM — R73.01 ELEVATED FASTING BLOOD SUGAR: Status: ACTIVE | Noted: 2023-01-14

## 2023-01-14 PROBLEM — E04.2 MULTINODULAR GOITER: Status: ACTIVE | Noted: 2023-01-14

## 2023-01-14 PROBLEM — E66.9 OBESITY (BMI 30.0-34.9): Status: ACTIVE | Noted: 2023-01-14

## 2023-01-14 PROBLEM — E04.1 THYROID NODULE: Status: ACTIVE | Noted: 2023-01-14

## 2023-01-14 PROBLEM — D64.9 MILD ANEMIA: Status: ACTIVE | Noted: 2023-01-14

## 2023-01-14 PROBLEM — I10 BENIGN ESSENTIAL HYPERTENSION: Status: ACTIVE | Noted: 2022-06-09

## 2023-01-14 RX ORDER — FLUTICASONE FUROATE, UMECLIDINIUM BROMIDE AND VILANTEROL TRIFENATATE 100; 62.5; 25 UG/1; UG/1; UG/1
1 POWDER RESPIRATORY (INHALATION) DAILY
COMMUNITY

## 2023-02-14 DIAGNOSIS — E55.9 VITAMIN D DEFICIENCY: ICD-10-CM

## 2023-02-14 DIAGNOSIS — E55.9 VITAMIN D DEFICIENCY, UNSPECIFIED: ICD-10-CM

## 2023-02-15 RX ORDER — IPRATROPIUM BROMIDE AND ALBUTEROL SULFATE 2.5; .5 MG/3ML; MG/3ML
SOLUTION RESPIRATORY (INHALATION)
Qty: 360 ML | Refills: 1 | Status: SHIPPED | OUTPATIENT
Start: 2023-02-15

## 2023-02-15 RX ORDER — MELATONIN
Qty: 90 TABLET | Refills: 2 | Status: SHIPPED | OUTPATIENT
Start: 2023-02-15

## 2023-02-17 ENCOUNTER — TELEPHONE (OUTPATIENT)
Dept: ENT CLINIC | Age: 68
End: 2023-02-17

## 2023-02-17 NOTE — TELEPHONE ENCOUNTER
While speaking to pt's wife about rescheduling 03/08 appt, she stated that she still has not heard from the pharmacy about the pt's \"shots for his sinuses\" and would like to know what they need to do in order for him to get that medication

## 2023-02-17 NOTE — TELEPHONE ENCOUNTER
Called pt to reschedule appt 03/08 due to Dr. Nettie Simon not being in the office that day and spoke to his wife, she stated that he was out right now and that she would get him to call the office back to reschedule

## 2023-03-11 ENCOUNTER — HOSPITAL ENCOUNTER (INPATIENT)
Age: 68
LOS: 2 days | Discharge: HOME | DRG: 191 | End: 2023-03-13
Attending: STUDENT IN AN ORGANIZED HEALTH CARE EDUCATION/TRAINING PROGRAM | Admitting: INTERNAL MEDICINE
Payer: MEDICARE

## 2023-03-11 ENCOUNTER — APPOINTMENT (OUTPATIENT)
Dept: GENERAL RADIOLOGY | Age: 68
DRG: 191 | End: 2023-03-11
Attending: STUDENT IN AN ORGANIZED HEALTH CARE EDUCATION/TRAINING PROGRAM
Payer: MEDICARE

## 2023-03-11 DIAGNOSIS — J45.21 MILD INTERMITTENT ASTHMA WITH ACUTE EXACERBATION: Primary | ICD-10-CM

## 2023-03-11 PROBLEM — J45.901 ASTHMA ATTACK: Status: ACTIVE | Noted: 2023-03-11

## 2023-03-11 LAB
ALBUMIN SERPL-MCNC: 3.6 G/DL (ref 3.5–5)
ALBUMIN/GLOB SERPL: 0.9 (ref 1.1–2.2)
ALP SERPL-CCNC: 112 U/L (ref 45–117)
ALT SERPL-CCNC: 20 U/L (ref 12–78)
ANION GAP SERPL CALC-SCNC: 5 MMOL/L (ref 5–15)
AST SERPL W P-5'-P-CCNC: 13 U/L (ref 15–37)
BILIRUB SERPL-MCNC: 0.3 MG/DL (ref 0.2–1)
BUN SERPL-MCNC: 14 MG/DL (ref 6–20)
BUN/CREAT SERPL: 18 (ref 12–20)
CA-I BLD-MCNC: 8.9 MG/DL (ref 8.5–10.1)
CHLORIDE SERPL-SCNC: 107 MMOL/L (ref 97–108)
CO2 SERPL-SCNC: 26 MMOL/L (ref 21–32)
CREAT SERPL-MCNC: 0.8 MG/DL (ref 0.7–1.3)
ERYTHROCYTE [DISTWIDTH] IN BLOOD BY AUTOMATED COUNT: 12.3 % (ref 11.5–14.5)
GLOBULIN SER CALC-MCNC: 3.8 G/DL (ref 2–4)
GLUCOSE SERPL-MCNC: 104 MG/DL (ref 65–100)
HCT VFR BLD AUTO: 40.5 % (ref 36.6–50.3)
HGB BLD-MCNC: 13.8 G/DL (ref 12.1–17)
MCH RBC QN AUTO: 28.9 PG (ref 26–34)
MCHC RBC AUTO-ENTMCNC: 34.1 G/DL (ref 30–36.5)
MCV RBC AUTO: 84.9 FL (ref 80–99)
NRBC # BLD: 0 K/UL (ref 0–0.01)
NRBC BLD-RTO: 0 PER 100 WBC
PLATELET # BLD AUTO: 183 K/UL (ref 150–400)
PMV BLD AUTO: 9 FL (ref 8.9–12.9)
POTASSIUM SERPL-SCNC: 3.9 MMOL/L (ref 3.5–5.1)
PROT SERPL-MCNC: 7.4 G/DL (ref 6.4–8.2)
RBC # BLD AUTO: 4.77 M/UL (ref 4.1–5.7)
SODIUM SERPL-SCNC: 138 MMOL/L (ref 136–145)
TROPONIN I SERPL HS-MCNC: 6 NG/L (ref 0–76)
WBC # BLD AUTO: 8.7 K/UL (ref 4.1–11.1)

## 2023-03-11 PROCEDURE — 65270000029 HC RM PRIVATE

## 2023-03-11 PROCEDURE — 85027 COMPLETE CBC AUTOMATED: CPT

## 2023-03-11 PROCEDURE — 84484 ASSAY OF TROPONIN QUANT: CPT

## 2023-03-11 PROCEDURE — 96374 THER/PROPH/DIAG INJ IV PUSH: CPT

## 2023-03-11 PROCEDURE — 74011250637 HC RX REV CODE- 250/637: Performed by: STUDENT IN AN ORGANIZED HEALTH CARE EDUCATION/TRAINING PROGRAM

## 2023-03-11 PROCEDURE — 94640 AIRWAY INHALATION TREATMENT: CPT

## 2023-03-11 PROCEDURE — 99285 EMERGENCY DEPT VISIT HI MDM: CPT

## 2023-03-11 PROCEDURE — 80053 COMPREHEN METABOLIC PANEL: CPT

## 2023-03-11 PROCEDURE — 71045 X-RAY EXAM CHEST 1 VIEW: CPT

## 2023-03-11 PROCEDURE — 74011250636 HC RX REV CODE- 250/636: Performed by: STUDENT IN AN ORGANIZED HEALTH CARE EDUCATION/TRAINING PROGRAM

## 2023-03-11 PROCEDURE — 93005 ELECTROCARDIOGRAM TRACING: CPT

## 2023-03-11 PROCEDURE — 36415 COLL VENOUS BLD VENIPUNCTURE: CPT

## 2023-03-11 PROCEDURE — 74011000250 HC RX REV CODE- 250: Performed by: STUDENT IN AN ORGANIZED HEALTH CARE EDUCATION/TRAINING PROGRAM

## 2023-03-11 RX ORDER — TAMSULOSIN HYDROCHLORIDE 0.4 MG/1
0.4 CAPSULE ORAL DAILY
Status: DISCONTINUED | OUTPATIENT
Start: 2023-03-12 | End: 2023-03-13 | Stop reason: HOSPADM

## 2023-03-11 RX ORDER — ACETAMINOPHEN 325 MG/1
650 TABLET ORAL
Status: COMPLETED | OUTPATIENT
Start: 2023-03-11 | End: 2023-03-11

## 2023-03-11 RX ORDER — PANTOPRAZOLE SODIUM 40 MG/1
40 TABLET, DELAYED RELEASE ORAL
Status: DISCONTINUED | OUTPATIENT
Start: 2023-03-12 | End: 2023-03-13 | Stop reason: HOSPADM

## 2023-03-11 RX ORDER — SODIUM CHLORIDE 0.9 % (FLUSH) 0.9 %
5-40 SYRINGE (ML) INJECTION AS NEEDED
Status: DISCONTINUED | OUTPATIENT
Start: 2023-03-11 | End: 2023-03-13 | Stop reason: HOSPADM

## 2023-03-11 RX ORDER — ACETAMINOPHEN 650 MG/1
650 SUPPOSITORY RECTAL
Status: DISCONTINUED | OUTPATIENT
Start: 2023-03-11 | End: 2023-03-13 | Stop reason: HOSPADM

## 2023-03-11 RX ORDER — FLUTICASONE PROPIONATE 50 MCG
2 SPRAY, SUSPENSION (ML) NASAL DAILY
Status: DISCONTINUED | OUTPATIENT
Start: 2023-03-12 | End: 2023-03-13

## 2023-03-11 RX ORDER — ENOXAPARIN SODIUM 100 MG/ML
40 INJECTION SUBCUTANEOUS DAILY
Status: DISCONTINUED | OUTPATIENT
Start: 2023-03-12 | End: 2023-03-13 | Stop reason: HOSPADM

## 2023-03-11 RX ORDER — AMLODIPINE BESYLATE 5 MG/1
10 TABLET ORAL DAILY
Status: DISCONTINUED | OUTPATIENT
Start: 2023-03-12 | End: 2023-03-13 | Stop reason: HOSPADM

## 2023-03-11 RX ORDER — MONTELUKAST SODIUM 10 MG/1
10 TABLET ORAL
Status: DISCONTINUED | OUTPATIENT
Start: 2023-03-11 | End: 2023-03-13 | Stop reason: HOSPADM

## 2023-03-11 RX ORDER — POLYETHYLENE GLYCOL 3350 17 G/17G
17 POWDER, FOR SOLUTION ORAL DAILY PRN
Status: DISCONTINUED | OUTPATIENT
Start: 2023-03-11 | End: 2023-03-13 | Stop reason: HOSPADM

## 2023-03-11 RX ORDER — LOSARTAN POTASSIUM 50 MG/1
100 TABLET ORAL DAILY
Status: DISCONTINUED | OUTPATIENT
Start: 2023-03-12 | End: 2023-03-13 | Stop reason: HOSPADM

## 2023-03-11 RX ORDER — ONDANSETRON 4 MG/1
4 TABLET, ORALLY DISINTEGRATING ORAL
Status: DISCONTINUED | OUTPATIENT
Start: 2023-03-11 | End: 2023-03-13 | Stop reason: HOSPADM

## 2023-03-11 RX ORDER — CYCLOBENZAPRINE HCL 10 MG
5 TABLET ORAL
Status: DISCONTINUED | OUTPATIENT
Start: 2023-03-11 | End: 2023-03-13 | Stop reason: HOSPADM

## 2023-03-11 RX ORDER — FINASTERIDE 5 MG/1
5 TABLET, FILM COATED ORAL DAILY
Status: DISCONTINUED | OUTPATIENT
Start: 2023-03-12 | End: 2023-03-13 | Stop reason: HOSPADM

## 2023-03-11 RX ORDER — ACETAMINOPHEN 325 MG/1
650 TABLET ORAL
Status: DISCONTINUED | OUTPATIENT
Start: 2023-03-11 | End: 2023-03-13 | Stop reason: HOSPADM

## 2023-03-11 RX ORDER — AZITHROMYCIN 500 MG/1
500 TABLET, FILM COATED ORAL
Status: COMPLETED | OUTPATIENT
Start: 2023-03-11 | End: 2023-03-11

## 2023-03-11 RX ORDER — ONDANSETRON 2 MG/ML
4 INJECTION INTRAMUSCULAR; INTRAVENOUS
Status: DISCONTINUED | OUTPATIENT
Start: 2023-03-11 | End: 2023-03-13 | Stop reason: HOSPADM

## 2023-03-11 RX ORDER — SODIUM CHLORIDE 0.9 % (FLUSH) 0.9 %
5-40 SYRINGE (ML) INJECTION EVERY 8 HOURS
Status: DISCONTINUED | OUTPATIENT
Start: 2023-03-11 | End: 2023-03-13

## 2023-03-11 RX ORDER — MELATONIN
1000 DAILY
Status: DISCONTINUED | OUTPATIENT
Start: 2023-03-12 | End: 2023-03-13 | Stop reason: HOSPADM

## 2023-03-11 RX ORDER — BUDESONIDE AND FORMOTEROL FUMARATE DIHYDRATE 160; 4.5 UG/1; UG/1
2 AEROSOL RESPIRATORY (INHALATION)
Status: DISCONTINUED | OUTPATIENT
Start: 2023-03-11 | End: 2023-03-13 | Stop reason: HOSPADM

## 2023-03-11 RX ORDER — SPIRONOLACTONE 25 MG/1
25 TABLET ORAL DAILY
Status: DISCONTINUED | OUTPATIENT
Start: 2023-03-12 | End: 2023-03-13 | Stop reason: HOSPADM

## 2023-03-11 RX ORDER — IPRATROPIUM BROMIDE AND ALBUTEROL SULFATE 2.5; .5 MG/3ML; MG/3ML
3 SOLUTION RESPIRATORY (INHALATION)
Status: COMPLETED | OUTPATIENT
Start: 2023-03-11 | End: 2023-03-11

## 2023-03-11 RX ORDER — IPRATROPIUM BROMIDE AND ALBUTEROL SULFATE 2.5; .5 MG/3ML; MG/3ML
3 SOLUTION RESPIRATORY (INHALATION)
Status: DISCONTINUED | OUTPATIENT
Start: 2023-03-11 | End: 2023-03-13 | Stop reason: HOSPADM

## 2023-03-11 RX ADMIN — IPRATROPIUM BROMIDE AND ALBUTEROL SULFATE 3 ML: .5; 2.5 SOLUTION RESPIRATORY (INHALATION) at 20:37

## 2023-03-11 RX ADMIN — ACETAMINOPHEN 650 MG: 325 TABLET ORAL at 22:33

## 2023-03-11 RX ADMIN — METHYLPREDNISOLONE SODIUM SUCCINATE 125 MG: 125 INJECTION, POWDER, FOR SOLUTION INTRAMUSCULAR; INTRAVENOUS at 20:44

## 2023-03-11 RX ADMIN — IPRATROPIUM BROMIDE AND ALBUTEROL SULFATE 3 ML: .5; 2.5 SOLUTION RESPIRATORY (INHALATION) at 20:46

## 2023-03-11 RX ADMIN — AZITHROMYCIN MONOHYDRATE 500 MG: 500 TABLET ORAL at 20:44

## 2023-03-11 RX ADMIN — IPRATROPIUM BROMIDE AND ALBUTEROL SULFATE 3 ML: .5; 2.5 SOLUTION RESPIRATORY (INHALATION) at 21:00

## 2023-03-12 LAB
ANION GAP SERPL CALC-SCNC: 7 MMOL/L (ref 5–15)
ATRIAL RATE: 107 BPM
BUN SERPL-MCNC: 16 MG/DL (ref 6–20)
BUN/CREAT SERPL: 16 (ref 12–20)
CA-I BLD-MCNC: 9.4 MG/DL (ref 8.5–10.1)
CALCULATED P AXIS, ECG09: 52 DEGREES
CALCULATED R AXIS, ECG10: 27 DEGREES
CALCULATED T AXIS, ECG11: 60 DEGREES
CHLORIDE SERPL-SCNC: 106 MMOL/L (ref 97–108)
CO2 SERPL-SCNC: 24 MMOL/L (ref 21–32)
CREAT SERPL-MCNC: 1.03 MG/DL (ref 0.7–1.3)
DIAGNOSIS, 93000: NORMAL
ERYTHROCYTE [DISTWIDTH] IN BLOOD BY AUTOMATED COUNT: 12.3 % (ref 11.5–14.5)
GLUCOSE SERPL-MCNC: 143 MG/DL (ref 65–100)
HCT VFR BLD AUTO: 42.3 % (ref 36.6–50.3)
HGB BLD-MCNC: 13.8 G/DL (ref 12.1–17)
MCH RBC QN AUTO: 28.1 PG (ref 26–34)
MCHC RBC AUTO-ENTMCNC: 32.6 G/DL (ref 30–36.5)
MCV RBC AUTO: 86.2 FL (ref 80–99)
NRBC # BLD: 0 K/UL (ref 0–0.01)
NRBC BLD-RTO: 0 PER 100 WBC
P-R INTERVAL, ECG05: 166 MS
PLATELET # BLD AUTO: 190 K/UL (ref 150–400)
PMV BLD AUTO: 9.6 FL (ref 8.9–12.9)
POTASSIUM SERPL-SCNC: 4 MMOL/L (ref 3.5–5.1)
Q-T INTERVAL, ECG07: 344 MS
QRS DURATION, ECG06: 86 MS
QTC CALCULATION (BEZET), ECG08: 459 MS
RBC # BLD AUTO: 4.91 M/UL (ref 4.1–5.7)
SODIUM SERPL-SCNC: 137 MMOL/L (ref 136–145)
TROPONIN I SERPL HS-MCNC: 4 NG/L (ref 0–76)
VENTRICULAR RATE, ECG03: 107 BPM
WBC # BLD AUTO: 6 K/UL (ref 4.1–11.1)

## 2023-03-12 PROCEDURE — 36415 COLL VENOUS BLD VENIPUNCTURE: CPT

## 2023-03-12 PROCEDURE — 84484 ASSAY OF TROPONIN QUANT: CPT

## 2023-03-12 PROCEDURE — 74011250637 HC RX REV CODE- 250/637: Performed by: INTERNAL MEDICINE

## 2023-03-12 PROCEDURE — 65270000032 HC RM SEMIPRIVATE

## 2023-03-12 PROCEDURE — 80048 BASIC METABOLIC PNL TOTAL CA: CPT

## 2023-03-12 PROCEDURE — 94640 AIRWAY INHALATION TREATMENT: CPT

## 2023-03-12 PROCEDURE — 74011000250 HC RX REV CODE- 250: Performed by: INTERNAL MEDICINE

## 2023-03-12 PROCEDURE — 74011250636 HC RX REV CODE- 250/636: Performed by: INTERNAL MEDICINE

## 2023-03-12 PROCEDURE — 85027 COMPLETE CBC AUTOMATED: CPT

## 2023-03-12 RX ADMIN — PANTOPRAZOLE SODIUM 40 MG: 40 TABLET, DELAYED RELEASE ORAL at 06:21

## 2023-03-12 RX ADMIN — LOSARTAN POTASSIUM 100 MG: 50 TABLET, FILM COATED ORAL at 08:33

## 2023-03-12 RX ADMIN — ENOXAPARIN SODIUM 40 MG: 100 INJECTION SUBCUTANEOUS at 08:33

## 2023-03-12 RX ADMIN — METHYLPREDNISOLONE SODIUM SUCCINATE 40 MG: 40 INJECTION, POWDER, FOR SOLUTION INTRAMUSCULAR; INTRAVENOUS at 06:16

## 2023-03-12 RX ADMIN — AMLODIPINE BESYLATE 10 MG: 5 TABLET ORAL at 08:32

## 2023-03-12 RX ADMIN — AZITHROMYCIN DIHYDRATE 500 MG: 500 INJECTION, POWDER, LYOPHILIZED, FOR SOLUTION INTRAVENOUS at 21:34

## 2023-03-12 RX ADMIN — TIOTROPIUM BROMIDE INHALATION SPRAY 2 PUFF: 3.12 SPRAY, METERED RESPIRATORY (INHALATION) at 08:46

## 2023-03-12 RX ADMIN — FINASTERIDE 5 MG: 5 TABLET, FILM COATED ORAL at 10:03

## 2023-03-12 RX ADMIN — TAMSULOSIN HYDROCHLORIDE 0.4 MG: 0.4 CAPSULE ORAL at 08:33

## 2023-03-12 RX ADMIN — MONTELUKAST 10 MG: 10 TABLET, FILM COATED ORAL at 21:34

## 2023-03-12 RX ADMIN — SODIUM CHLORIDE, PRESERVATIVE FREE 10 ML: 5 INJECTION INTRAVENOUS at 15:19

## 2023-03-12 RX ADMIN — SODIUM CHLORIDE, PRESERVATIVE FREE 10 ML: 5 INJECTION INTRAVENOUS at 06:22

## 2023-03-12 RX ADMIN — MONTELUKAST 10 MG: 10 TABLET, FILM COATED ORAL at 01:38

## 2023-03-12 RX ADMIN — METHYLPREDNISOLONE SODIUM SUCCINATE 40 MG: 40 INJECTION, POWDER, FOR SOLUTION INTRAMUSCULAR; INTRAVENOUS at 14:53

## 2023-03-12 RX ADMIN — Medication 1000 UNITS: at 08:33

## 2023-03-12 RX ADMIN — BUDESONIDE AND FORMOTEROL FUMARATE DIHYDRATE 2 PUFF: 160; 4.5 AEROSOL RESPIRATORY (INHALATION) at 08:46

## 2023-03-12 RX ADMIN — SPIRONOLACTONE 25 MG: 25 TABLET ORAL at 08:33

## 2023-03-12 RX ADMIN — SODIUM CHLORIDE, PRESERVATIVE FREE 10 ML: 5 INJECTION INTRAVENOUS at 21:39

## 2023-03-12 RX ADMIN — BUDESONIDE AND FORMOTEROL FUMARATE DIHYDRATE 2 PUFF: 160; 4.5 AEROSOL RESPIRATORY (INHALATION) at 00:11

## 2023-03-12 RX ADMIN — SODIUM CHLORIDE, PRESERVATIVE FREE 10 ML: 5 INJECTION INTRAVENOUS at 01:38

## 2023-03-12 RX ADMIN — BUDESONIDE AND FORMOTEROL FUMARATE DIHYDRATE 2 PUFF: 160; 4.5 AEROSOL RESPIRATORY (INHALATION) at 21:39

## 2023-03-12 RX ADMIN — METHYLPREDNISOLONE SODIUM SUCCINATE 40 MG: 40 INJECTION, POWDER, FOR SOLUTION INTRAMUSCULAR; INTRAVENOUS at 21:34

## 2023-03-12 NOTE — PROGRESS NOTES
Problem: Falls - Risk of  Goal: *Absence of Falls  Description: Document Kimmie Womcaks Fall Risk and appropriate interventions in the flowsheet.   Outcome: Progressing Towards Goal  Note: Fall Risk Interventions:                                Problem: Patient Education: Go to Patient Education Activity  Goal: Patient/Family Education  Outcome: Progressing Towards Goal

## 2023-03-12 NOTE — H&P
History and Physical    Patient: Lydia Garcia MRN: [id number]  SSN: [id number]    YOB: 1955  Age: 69 y.o. Sex: male      Subjective:      Lydia Garcia is a 69 y.o. male with PMH of hypertension, HLP, asthma, chronic sinusitis, post-nasal drip and other medical problems below. He presented tot he ED with chief complaint of shortness of breath started today. Associated with wheezing, productive cough. No fever or chills. He reports symptoms worsened with post-nasal drip and exertion. In addition, he reports having moderate sharp right upper arm pain, otherwise denies extremity weakness. No other focal neurology findings. In the ED, noted tachycardia, normotensive. Chest X-ray no acute findings. Chart review: none    Past Medical History:   Diagnosis Date    Asthma     Benign essential hypertension     Bronchiectasis without complication (HCC)     Elevated fasting blood sugar     Eosinophilia, unspecified type     GERD (gastroesophageal reflux disease)     Hypercholesterolemia     Hypertension     Mild anemia     Multinodular goiter     Obesity (BMI 30.0-34. 9)     Thrombocytopenia (HCC)     Thyroid nodule     Vitamin D deficiency      Family History   Problem Relation Age of Onset    Hypertension Father      Social History     Tobacco Use    Smoking status: Never    Smokeless tobacco: Never   Substance Use Topics    Alcohol use: Never        Objective:     Physical Exam:   General: alert, cooperative, no distress  Eye: conjunctivae/corneas clear. PERRL, EOM's intact. Throat and Neck: normal and no erythema or exudates noted. No mass   Lung: clear to auscultation bilaterally  Heart: regular rate and rhythm,   Abdomen: soft, non-tender. Bowel sounds normal. No masses,  Extremities: No LE edema. able to move all extremities normal, atraumatic  Skin: Normal.  Neurologic: AOx3. Motor function and sensation grossly intact.   Psychiatric: non focal    Most recent lab work and imaging results reviewed in EMR. Assessment and plan:   # Asthma/COPD exacerbation  - Continue supplementary oxygen  - Nebulized bronchodilator treatment  - IV solumedrol  - IV Azithromycin   - Troponin mildly elevated likely secondary to respiratory distress, will trend. # Essential hypertension   - Continue home medications. PO amlodipine, losartan, aldactone. # Chronic sinusitis  - nasal flonase and phenylephrine PRN. # BPH  - Continue home medications. PO finasteride, flomax. # Vitamin D deficiency  - Vitamin D supplement. # Social Determents of health: None    # Full code by default, need further clarification    # Medication reconciliation: Medication list reviewed on Epic and/or outside documentation. Not reviewed with patient. However, medication reconciliation incomplete, appreciate assistance from pharmacy or nursing staff.     Signed By: Marina Guerrero MD     March 11, 2023

## 2023-03-12 NOTE — PROGRESS NOTES
Bedside shift change report given to Adam Roca (oncoming nurse) by Norma Beckman RN (offgoing nurse) Report included the following information SBAR, Intake/Output, MAR, and Recent Results.

## 2023-03-12 NOTE — PROGRESS NOTES
Problem: Falls - Risk of  Goal: *Absence of Falls  Description: Document Nicolle Beaver Fall Risk and appropriate interventions in the flowsheet. Outcome: Progressing Towards Goal  Note: Fall Risk Interventions:  Bed alarm on   Yellow gripper socks put on  Sign at door                               No falls during shift will continue to provide safety measures.

## 2023-03-12 NOTE — ED PROVIDER NOTES
[facility] EMERGENCY DEPT  EMERGENCY DEPARTMENT HISTORY AND PHYSICAL EXAM      Date: 3/11/2023  Patient Name: Nghia Cheung  MRN: [id number]  [place] 1955  Date of evaluation: 3/11/2023  Provider: Dorethea Seip, MD   Note Started: 10:45 PM 3/11/23    HISTORY OF PRESENT ILLNESS     Chief Complaint   Patient presents with    Shortness of Breath    Chest Pain       History Provided By: Patient    HPI: Nghia Cheung, 69 y.o. male with past medical history as reviewed below presents for evaluation of dyspnea. Symptoms started today. Continues to use his inhalers at home. He has cough that is nonproductive, no known sick contacts. He is up-to-date on his COVID vaccines. No lower extremity edema, slight chest discomfort with breathing. No other complaints. PAST MEDICAL HISTORY   Past Medical History:  Past Medical History:   Diagnosis Date    Asthma     Benign essential hypertension     Bronchiectasis without complication (HCC)     Elevated fasting blood sugar     Eosinophilia, unspecified type     GERD (gastroesophageal reflux disease)     Hypercholesterolemia     Hypertension     Mild anemia     Multinodular goiter     Obesity (BMI 30.0-34. 9)     Thrombocytopenia (HCC)     Thyroid nodule     Vitamin D deficiency        Past Surgical History:  Past Surgical History:   Procedure Laterality Date    IR FINE NEEDLE ASPIRATION W IMAGE  11/2/2022       Family History:  Family History   Problem Relation Age of Onset    Hypertension Father        Social History:  Social History     Tobacco Use    Smoking status: Never    Smokeless tobacco: Never   Substance Use Topics    Alcohol use: Never    Drug use: Never       Allergies:  No Known Allergies    PCP: Klaus Tanner MD    Current Meds:   Previous Medications    ALBUTEROL (PROVENTIL HFA, VENTOLIN HFA, PROAIR HFA) 90 MCG/ACTUATION INHALER    Take 2 Puffs by inhalation every four (4) hours as needed for Wheezing.     ALBUTEROL (PROVENTIL HFA, VENTOLIN HFA, PROAIR HFA) 90 MCG/ACTUATION INHALER    albuterol sulfate HFA 90 mcg/actuation aerosol inhaler    ALBUTEROL (PROVENTIL HFA, VENTOLIN HFA, PROAIR HFA) 90 MCG/ACTUATION INHALER    INHALE 1 PUFF EVERY 6 HOURS AS NEEDED    ALBUTEROL (VENTOLIN HFA) 90 MCG/ACTUATION INHALER    Take 2 Puffs by inhalation every four (4) hours as needed for Wheezing. ALBUTEROL-IPRATROPIUM (DUO-NEB) 2.5 MG-0.5 MG/3 ML NEBU    USE 1 VIAL IN NEBULIZER 4 TIMES A DAY    AMLODIPINE (NORVASC) 10 MG TABLET    TAKE 1 (ONE) TABLET AT NIGHT    AMOXICILLIN-CLAVULANATE (AUGMENTIN) 875-125 MG PER TABLET    Take 1 Tablet by mouth two (2) times a day. AZITHROMYCIN (ZITHROMAX Z-ALONSO) 250 MG TABLET    Take 2 tablet p.o. day 1 then 1 tablet p.o. day 2 through 5    BUDESONIDE (PULMICORT) 0.5 MG/2 ML NBSP    budesonide 0.5 mg/2 mL suspension for nebulization    BUDESONIDE-FORMOTEROL (SYMBICORT) 160-4.5 MCG/ACTUATION HFAA    Symbicort 160 mcg-4.5 mcg/actuation HFA aerosol inhaler   INHALE 2 PUFFS BY MOUTH TWICE A DAY (MORNING AND EVENING)    CHOLECALCIFEROL (VITAMIN D3) (1000 UNITS /25 MCG) TABLET    TAKE 1 TABLET BY MOUTH EVERY DAY    DEXTROMETHORPHAN-GUAIFENESIN (ROBITUSSIN COUGH-CHEST JESSICA DM) 5-100 MG/5 ML LIQD    Take 5 mL by mouth every six (6) hours. DICLOFENAC (VOLTAREN) 1 % GEL    Apply  to affected area four (4) times daily. DUPILUMAB (DUPIXENT SYRINGE) 300 MG/2 ML SYRG SYRINGE    2 mL by SubCUTAneous route every fourteen (14) days. FINASTERIDE (PROSCAR) 5 MG TABLET    Take 5 mg by mouth daily. FLUTICASONE FUROATE-VILANTEROL (BREO ELLIPTA) 100-25 MCG/DOSE INHALER    Breo Ellipta 100 mcg-25 mcg/dose powder for inhalation    FLUTICASONE FUROATE-VILANTEROL (BREO ELLIPTA) 200-25 MCG/DOSE INHALER    Take 1 Puff by inhalation daily.     FLUTICASONE PROPION-SALMETEROL (ADVAIR/WIXELA) 250-50 MCG/DOSE DISKUS INHALER    Advair Diskus 250 mcg-50 mcg/dose powder for inhalation    FLUTICASONE PROPIONATE (FLONASE) 50 MCG/ACTUATION NASAL SPRAY    fluticasone propionate 50 mcg/actuation nasal spray,suspension    FLUTICASONE-UMECLIDINIUM-VILANTEROL (TRELEGY ELLIPTA) 100-62.5-25 MCG INHALER    Take 1 Puff by inhalation daily. LOSARTAN (COZAAR) 100 MG TABLET    losartan 100 mg tablet    METOCLOPRAMIDE HCL (REGLAN) 10 MG TABLET    metoclopramide 10 mg tablet    MOMETASONE-FORMOTEROL (DULERA) 100-5 MCG/ACTUATION HFA INHALER    Dulera 100 mcg-5 mcg/actuation HFA aerosol inhaler   INHALE 2 PUFFS BY MOUTH TWICE A DAY    MONTELUKAST (SINGULAIR) 10 MG TABLET    montelukast 10 mg tablet   TAKE 1 TABLET BY MOUTH EVERY DAY    MULTIVIT-MIN/FA/LYCOPEN/LUTEIN (CENTRUM SILVER MEN PO)    Take 1 Tablet by mouth daily. OMEPRAZOLE (PRILOSEC) 40 MG CAPSULE    omeprazole 40 mg capsule,delayed release    PANTOPRAZOLE (PROTONIX) 40 MG TABLET    pantoprazole 40 mg tablet,delayed release    POTASSIUM CHLORIDE SR (KLOR-CON 10) 10 MEQ TABLET    Klor-Con 10 mEq tablet,extended release   TAKE 1 TABLET BY MOUTH EVERY DAY    PREDNISONE (DELTASONE) 10 MG TABLET    4 tab PO days 1-2     3 tab PO days 3-4      2 tab PO days 5-6      1 tab PO days 7-8    SPIRONOLACTONE (ALDACTONE) 25 MG TABLET    spironolactone 25 mg tablet    TAMSULOSIN (FLOMAX) 0.4 MG CAPSULE    tamsulosin 0.4 mg capsule    TRAMADOL (ULTRAM) 50 MG TABLET    tramadol 50 mg tablet   TAKE 1 - 2 TABLETS EVERY 4 - 6 HOURS AS NEEDED FOR SEVERE PAIN       REVIEW OF SYSTEMS   Review of Systems    Positives and Pertinent negatives as per HPI. PHYSICAL EXAM     ED Triage Vitals [03/11/23 1958]   ED Encounter Vitals Group      BP (!) 159/99      Pulse (Heart Rate) (!) 105      Resp Rate 24      Temp 98 °F (36.7 °C)      Temp src       O2 Sat (%) 97 %      Weight 180 lb      Height 5' 10\"     Physical Exam  Vitals reviewed. Constitutional:       General: He is not in acute distress. Appearance: He is not toxic-appearing. HENT:      Head: Normocephalic and atraumatic. Eyes:      Extraocular Movements: Extraocular movements intact. Pupils: Pupils are equal, round, and reactive to light. Cardiovascular:      Rate and Rhythm: Normal rate and regular rhythm. Heart sounds: Normal heart sounds. Pulmonary:      Effort: Pulmonary effort is normal.      Breath sounds: Decreased breath sounds and wheezing present. Abdominal:      Palpations: Abdomen is soft. Tenderness: There is no abdominal tenderness. Musculoskeletal:      Right lower leg: No edema. Left lower leg: No edema. Skin:     General: Skin is warm and dry. Neurological:      General: No focal deficit present. Mental Status: He is alert. Psychiatric:         Mood and Affect: Mood normal.         Behavior: Behavior normal.          SCREENINGS               No data recorded        LAB, EKG AND DIAGNOSTIC RESULTS   Labs:  Recent Results (from the past 12 hour(s))   CBC W/O DIFF    Collection Time: 03/11/23  8:16 PM   Result Value Ref Range    WBC 8.7 4.1 - 11.1 K/uL    RBC 4.77 4.10 - 5.70 M/uL    HGB 13.8 12.1 - 17.0 g/dL    HCT 40.5 36.6 - 50.3 %    MCV 84.9 80.0 - 99.0 FL    MCH 28.9 26.0 - 34.0 PG    MCHC 34.1 30.0 - 36.5 g/dL    RDW 12.3 11.5 - 14.5 %    PLATELET 393 422 - 019 K/uL    MPV 9.0 8.9 - 12.9 FL    NRBC 0.0 0.0  WBC    ABSOLUTE NRBC 0.00 0.00 - 0.01 K/uL   TROPONIN-HIGH SENSITIVITY    Collection Time: 03/11/23  8:16 PM   Result Value Ref Range    Troponin-High Sensitivity 6 0 - 76 ng/L   METABOLIC PANEL, COMPREHENSIVE    Collection Time: 03/11/23  8:16 PM   Result Value Ref Range    Sodium 138 136 - 145 mmol/L    Potassium 3.9 3.5 - 5.1 mmol/L    Chloride 107 97 - 108 mmol/L    CO2 26 21 - 32 mmol/L    Anion gap 5 5 - 15 mmol/L    Glucose 104 (H) 65 - 100 mg/dL    BUN 14 6 - 20 mg/dL    Creatinine 0.80 0.70 - 1.30 mg/dL    BUN/Creatinine ratio 18 12 - 20      eGFR >60 >60 ml/min/1.73m2    Calcium 8.9 8.5 - 10.1 mg/dL    Bilirubin, total 0.3 0.2 - 1.0 mg/dL    AST (SGOT) 13 (L) 15 - 37 U/L    ALT (SGPT) 20 12 - 78 U/L    Alk. phosphatase 112 45 - 117 U/L    Protein, total 7.4 6.4 - 8.2 g/dL    Albumin 3.6 3.5 - 5.0 g/dL    Globulin 3.8 2.0 - 4.0 g/dL    A-G Ratio 0.9 (L) 1.1 - 2.2         Radiologic Studies:  Interpretation per the Radiologist below, if available at the time of this note:  XR CHEST PORT    Result Date: 3/11/2023  EXAM:  XR CHEST PORT INDICATION:   Eval for Pneumonia COMPARISON: Chest radiograph 7/31/2021. FINDINGS: AP radiograph of the chest was obtained. No evidence of focal consolidation. No pleural effusion or pneumothorax. Heart size is normal. Calcifications of the thoracic aorta. No acute osseous abnormalities. No acute cardiopulmonary process. PROCEDURES   Unless otherwise noted below, none  Performed by: Dion Mata MD   Procedures        EMERGENCY DEPARTMENT COURSE and DIFFERENTIAL DIAGNOSIS/MDM   Vitals:    Vitals:    03/11/23 2129 03/11/23 2139 03/11/23 2149 03/11/23 2208   BP: 110/66  118/80 132/79   Pulse: (!) 111 (!) 110     Resp: 20 29     Temp:       SpO2: 96% (!) 89% 92% 93%   Weight:       Height:            Patient was given the following medications:  Medications   albuterol-ipratropium (DUO-NEB) 2.5 MG-0.5 MG/3 ML (3 mL Nebulization Given 3/11/23 2100)   azithromycin (ZITHROMAX) tablet 500 mg (500 mg Oral Given 3/11/23 2044)   methylPREDNISolone (PF) (Solu-MEDROL) injection 125 mg (125 mg IntraVENous Given 3/11/23 2044)   acetaminophen (TYLENOL) tablet 650 mg (650 mg Oral Given 3/11/23 2233)       CC/HPI Summary, DDx, ED Course, and Reassessment:   68-year-old male presents for evaluation of dyspnea. He is wheezing on exam, symptoms likely from an exacerbation of his previously diagnosed asthma. Slight chest pain with breathing so will check EKG to rule out obvious cardiac etiology. Chest x-ray to evaluate for pneumonia. Treating with DuoNebs and steroid. Improvement after nebs but still wheezing still slightly tachypneic. Will admit for further treatment.   ED Course as of 03/11/23 2245   Sat Mar 11, 2023   2002 ECG performed at 2000 and interpreted by me shows sinus tachycardia with a ventricular rate of 107, normal intervals, no ST elevation or depression [BQ]      ED Course User Index  [BQ] Yessi Gaspar MD           ED FINAL IMPRESSION     1. Mild intermittent asthma with acute exacerbation          DISPOSITION/PLAN       Admit Note: Pt is being admitted by Hospitalist. The results of their tests and reason(s) for their admission have been discussed with pt and/or available family. They convey agreement and understanding for the need to be admitted and for the admission diagnosis. I am the Primary Clinician of Record. Viviana Perry MD (electronically signed)    (Please note that parts of this dictation were completed with voice recognition software. Quite often unanticipated grammatical, syntax, homophones, and other interpretive errors are inadvertently transcribed by the computer software. Please disregards these errors.  Please excuse any errors that have escaped final proofreading.)

## 2023-03-12 NOTE — ROUTINE PROCESS
Bedside shift change report given to [address] [place] [address] (oncoming nurse) by Loc Mckinney RN (offgoing nurse). Report included the following information SBAR, Intake/Output, MAR, and Recent Results. How Severe Is Your Skin Lesion?: mild Has Your Skin Lesion Been Treated?: not been treated Is This A New Presentation, Or A Follow-Up?: Skin Lesion

## 2023-03-12 NOTE — PROGRESS NOTES
Hospitalist Progress Note            Daily Progress Note: 3/12/2023 8:04 AM  Hospital course:     Mona Leary is a 69 y.o. male with PMH of hypertension, HLP, asthma, chronic sinusitis, post-nasal drip and other medical problems below. He presented tot he ED with chief complaint of shortness of breath started today. Associated with wheezing, productive cough. No fever or chills. He reports symptoms worsened with post-nasal drip and exertion. In addition, he reports having moderate sharp right upper arm pain, otherwise denies extremity weakness. No other focal neurology findings. In the ED, noted tachycardia, normotensive. Chest X-ray no acute findings. Admitted for further management of asthma/COPD exacerbation. Started on IV Solu-Medrol 40 mg every 8 hours and IV azithromycin 500 mg daily. Subjective:     Examined patient at the bedside. Patient and family member at the bedside want to know about the injectable medication the ENT doctor ordered but they have not been able to get from the pharmacy due to availability, I cannot remember the name. Looking back in the chart ENT doctor ordered Dupilumab injections to treat his asthma and poorly controlled chronic rhinosinusitis, has never been able to fill the prescription. Assessment/Plan:   Active Problems:    Asthma attack (3/11/2023)      # Asthma/COPD exacerbation  - Continue supplementary oxygen  - Nebulized bronchodilator treatment  - IV solumedrol  - IV Azithromycin   - Troponin mildly elevated likely secondary to respiratory distress, will trend. # Essential hypertension   - Continue home medications. PO amlodipine, losartan, aldactone. # Chronic sinusitis  - nasal flonase and phenylephrine PRN. # BPH  - Continue home medications. PO finasteride, flomax. # Vitamin D deficiency  - Vitamin D supplement. # Social Determents of health: None      DVT Prophylaxis: Lovenox  Lovenox will be used for DVT prophylaxis.   We will continue to monitor for signs and symptoms of adverse reaction, adverse side effects or toxicity including:  Hemorrhage, fever, site reaction, thrombocytopenia, nausea, anemia, ecchymosis  Monitoring will include frequent laboratory values and physical exam.     Code Status: Full Code  POA/NOK:    Disposition and discharge barriers:   Continue IV therapy antibiotic and steroids  Wean oxygen  Care Plan discussed with: Patient and staff    Current Facility-Administered Medications   Medication Dose Route Frequency    azithromycin (ZITHROMAX) 500 mg in 0.9% sodium chloride 250 mL ([id number])  500 mg IntraVENous Q24H    amLODIPine (NORVASC) tablet 10 mg  10 mg Oral DAILY    cholecalciferol (VITAMIN D3) (1000 Units /25 mcg) tablet 1,000 Units  1,000 Units Oral DAILY    finasteride (PROSCAR) tablet 5 mg  5 mg Oral DAILY    budesonide-formoteroL (SYMBICORT) 160-4.5 mcg/actuation HFA inhaler 2 Puff  2 Puff Inhalation BID RT    And    tiotropium bromide (SPIRIVA RESPIMAT) 2.5 mcg /actuation  2 Puff Inhalation DAILY    losartan (COZAAR) tablet 100 mg  100 mg Oral DAILY    montelukast (SINGULAIR) tablet 10 mg  10 mg Oral QHS    pantoprazole (PROTONIX) tablet 40 mg  40 mg Oral ACB    tamsulosin (FLOMAX) capsule 0.4 mg  0.4 mg Oral DAILY    spironolactone (ALDACTONE) tablet 25 mg  25 mg Oral DAILY    sodium chloride (NS) flush 5-40 mL  5-40 mL IntraVENous Q8H    sodium chloride (NS) flush 5-40 mL  5-40 mL IntraVENous PRN    acetaminophen (TYLENOL) tablet 650 mg  650 mg Oral Q6H PRN    Or    acetaminophen (TYLENOL) suppository 650 mg  650 mg Rectal Q6H PRN    polyethylene glycol (MIRALAX) packet 17 g  17 g Oral DAILY PRN    ondansetron (ZOFRAN ODT) tablet 4 mg  4 mg Oral Q8H PRN    Or    ondansetron (ZOFRAN) injection 4 mg  4 mg IntraVENous Q6H PRN    enoxaparin (LOVENOX) injection 40 mg  40 mg SubCUTAneous DAILY    methylPREDNISolone (PF) (SOLU-MEDROL) injection 40 mg  40 mg IntraVENous Q8H    albuterol-ipratropium (DUO-NEB) 2.5 MG-0.5 MG/3 ML  3 mL Nebulization Q6H PRN    cyclobenzaprine (FLEXERIL) tablet 5 mg  5 mg Oral TID PRN    fluticasone propionate (FLONASE) 50 mcg/actuation nasal spray 2 Spray  2 Spray Both Nostrils DAILY    phenylephrine (NEOSYNEPHRINE) 1 % nasal spray 1 Spray  1 Spray Both Nostrils Q6H PRN        REVIEW OF SYSTEMS    Review of Systems   Constitutional:  Positive for malaise/fatigue. Respiratory:  Positive for wheezing. Cardiovascular:  Negative for chest pain. Musculoskeletal:  Positive for myalgias. Neurological:  Positive for weakness. Objective:     Visit Vitals  /82 (BP 1 Location: Left upper arm, BP Patient Position: Sitting)   Pulse (!) 101   Temp 97.6 °F (36.4 °C)   Resp 18   Ht 5' 10\" (1.778 m)   Wt 81.6 kg (180 lb)   SpO2 97%   BMI 25.83 kg/m²      O2 Device: None (Room air)    Temp (24hrs), Av.7 °F (36.5 °C), Min:97.6 °F (36.4 °C), Max:98 °F (36.7 °C)        PHYSICAL EXAM:    Physical Exam  Constitutional:       Appearance: He is ill-appearing. HENT:      Nose: Congestion and rhinorrhea present. Cardiovascular:      Rate and Rhythm: Regular rhythm. Tachycardia present. Pulmonary:      Effort: No respiratory distress. Breath sounds: Wheezing present. Abdominal:      General: There is no distension. Musculoskeletal:      Right lower leg: No edema. Left lower leg: No edema. Skin:     Coloration: Skin is pale. Neurological:      Motor: Weakness present.         Data Review    Recent Results (from the past 24 hour(s))   CBC W/O DIFF    Collection Time: 23  8:16 PM   Result Value Ref Range    WBC 8.7 4.1 - 11.1 K/uL    RBC 4.77 4.10 - 5.70 M/uL    HGB 13.8 12.1 - 17.0 g/dL    HCT 40.5 36.6 - 50.3 %    MCV 84.9 80.0 - 99.0 FL    MCH 28.9 26.0 - 34.0 PG    MCHC 34.1 30.0 - 36.5 g/dL    RDW 12.3 11.5 - 14.5 %    PLATELET 359 582 - 849 K/uL    MPV 9.0 8.9 - 12.9 FL    NRBC 0.0 0.0  WBC    ABSOLUTE NRBC 0.00 0.00 - 0.01 K/uL   TROPONIN-HIGH SENSITIVITY Collection Time: 03/11/23  8:16 PM   Result Value Ref Range    Troponin-High Sensitivity 6 0 - 76 ng/L   METABOLIC PANEL, COMPREHENSIVE    Collection Time: 03/11/23  8:16 PM   Result Value Ref Range    Sodium 138 136 - 145 mmol/L    Potassium 3.9 3.5 - 5.1 mmol/L    Chloride 107 97 - 108 mmol/L    CO2 26 21 - 32 mmol/L    Anion gap 5 5 - 15 mmol/L    Glucose 104 (H) 65 - 100 mg/dL    BUN 14 6 - 20 mg/dL    Creatinine 0.80 0.70 - 1.30 mg/dL    BUN/Creatinine ratio 18 12 - 20      eGFR >60 >60 ml/min/1.73m2    Calcium 8.9 8.5 - 10.1 mg/dL    Bilirubin, total 0.3 0.2 - 1.0 mg/dL    AST (SGOT) 13 (L) 15 - 37 U/L    ALT (SGPT) 20 12 - 78 U/L    Alk. phosphatase 112 45 - 117 U/L    Protein, total 7.4 6.4 - 8.2 g/dL    Albumin 3.6 3.5 - 5.0 g/dL    Globulin 3.8 2.0 - 4.0 g/dL    A-G Ratio 0.9 (L) 1.1 - 2.2         XR CHEST PORT   Final Result   No acute cardiopulmonary process. Intake and Output:  Current Shift: No intake/output data recorded. Last three shifts: No intake/output data recorded. Lab/Data Review:  Recent Labs     03/11/23 2016   WBC 8.7   HGB 13.8   HCT 40.5        Recent Labs     03/11/23 2016      K 3.9      CO2 26   *   BUN 14   CREA 0.80   CA 8.9   ALB 3.6   TBILI 0.3   ALT 20     No results for input(s): PH, PCO2, PO2, HCO3, FIO2 in the last 72 hours.   Recent Results (from the past 24 hour(s))   CBC W/O DIFF    Collection Time: 03/11/23  8:16 PM   Result Value Ref Range    WBC 8.7 4.1 - 11.1 K/uL    RBC 4.77 4.10 - 5.70 M/uL    HGB 13.8 12.1 - 17.0 g/dL    HCT 40.5 36.6 - 50.3 %    MCV 84.9 80.0 - 99.0 FL    MCH 28.9 26.0 - 34.0 PG    MCHC 34.1 30.0 - 36.5 g/dL    RDW 12.3 11.5 - 14.5 %    PLATELET 139 107 - 751 K/uL    MPV 9.0 8.9 - 12.9 FL    NRBC 0.0 0.0  WBC    ABSOLUTE NRBC 0.00 0.00 - 0.01 K/uL   TROPONIN-HIGH SENSITIVITY    Collection Time: 03/11/23  8:16 PM   Result Value Ref Range    Troponin-High Sensitivity 6 0 - 76 ng/L   METABOLIC PANEL, COMPREHENSIVE    Collection Time: 03/11/23  8:16 PM   Result Value Ref Range    Sodium 138 136 - 145 mmol/L    Potassium 3.9 3.5 - 5.1 mmol/L    Chloride 107 97 - 108 mmol/L    CO2 26 21 - 32 mmol/L    Anion gap 5 5 - 15 mmol/L    Glucose 104 (H) 65 - 100 mg/dL    BUN 14 6 - 20 mg/dL    Creatinine 0.80 0.70 - 1.30 mg/dL    BUN/Creatinine ratio 18 12 - 20      eGFR >60 >60 ml/min/1.73m2    Calcium 8.9 8.5 - 10.1 mg/dL    Bilirubin, total 0.3 0.2 - 1.0 mg/dL    AST (SGOT) 13 (L) 15 - 37 U/L    ALT (SGPT) 20 12 - 78 U/L    Alk. phosphatase 112 45 - 117 U/L    Protein, total 7.4 6.4 - 8.2 g/dL    Albumin 3.6 3.5 - 5.0 g/dL    Globulin 3.8 2.0 - 4.0 g/dL    A-G Ratio 0.9 (L) 1.1 - 2.2             _____________________________________________________________________________  Time spent in direct care including coordination of service, review of data and examination: > 35 minutes    ______________________________________________________________________________    Elsy Spivey NP    This is dictation was done by [employer] voice recognition software. Quite often unanticipated grammatical, syntax, homophones and other interpretive errors or inadvertently transcribed by the computer software. Please excuse errors that have escaped final proofreading. Thank you.

## 2023-03-12 NOTE — ED NOTES
Called x-ray dept.  They stated they would call in the [address] [facility] team

## 2023-03-13 VITALS
WEIGHT: 180 LBS | SYSTOLIC BLOOD PRESSURE: 123 MMHG | BODY MASS INDEX: 25.77 KG/M2 | DIASTOLIC BLOOD PRESSURE: 78 MMHG | RESPIRATION RATE: 17 BRPM | HEIGHT: 70 IN | OXYGEN SATURATION: 97 % | HEART RATE: 89 BPM | TEMPERATURE: 97.6 F

## 2023-03-13 LAB
ANION GAP SERPL CALC-SCNC: 6 MMOL/L (ref 5–15)
BASOPHILS # BLD: 0 K/UL (ref 0–0.1)
BASOPHILS NFR BLD: 0 % (ref 0–1)
BUN SERPL-MCNC: 20 MG/DL (ref 6–20)
BUN/CREAT SERPL: 22 (ref 12–20)
CA-I BLD-MCNC: 9 MG/DL (ref 8.5–10.1)
CHLORIDE SERPL-SCNC: 105 MMOL/L (ref 97–108)
CO2 SERPL-SCNC: 24 MMOL/L (ref 21–32)
CREAT SERPL-MCNC: 0.93 MG/DL (ref 0.7–1.3)
DIFFERENTIAL METHOD BLD: ABNORMAL
EOSINOPHIL # BLD: 0 K/UL (ref 0–0.4)
EOSINOPHIL NFR BLD: 0 % (ref 0–7)
ERYTHROCYTE [DISTWIDTH] IN BLOOD BY AUTOMATED COUNT: 12.7 % (ref 11.5–14.5)
GLUCOSE SERPL-MCNC: 148 MG/DL (ref 65–100)
HCT VFR BLD AUTO: 41.6 % (ref 36.6–50.3)
HGB BLD-MCNC: 14.1 G/DL (ref 12.1–17)
IMM GRANULOCYTES # BLD AUTO: 0.1 K/UL (ref 0–0.04)
IMM GRANULOCYTES NFR BLD AUTO: 1 % (ref 0–0.5)
LYMPHOCYTES # BLD: 0.8 K/UL (ref 0.8–3.5)
LYMPHOCYTES NFR BLD: 4 % (ref 12–49)
MCH RBC QN AUTO: 28.8 PG (ref 26–34)
MCHC RBC AUTO-ENTMCNC: 33.9 G/DL (ref 30–36.5)
MCV RBC AUTO: 85.1 FL (ref 80–99)
MONOCYTES # BLD: 0.5 K/UL (ref 0–1)
MONOCYTES NFR BLD: 3 % (ref 5–13)
NEUTS SEG # BLD: 17.1 K/UL (ref 1.8–8)
NEUTS SEG NFR BLD: 92 % (ref 32–75)
NRBC # BLD: 0 K/UL (ref 0–0.01)
NRBC BLD-RTO: 0 PER 100 WBC
PLATELET # BLD AUTO: 201 K/UL (ref 150–400)
PMV BLD AUTO: 9.8 FL (ref 8.9–12.9)
POTASSIUM SERPL-SCNC: 3.7 MMOL/L (ref 3.5–5.1)
RBC # BLD AUTO: 4.89 M/UL (ref 4.1–5.7)
SODIUM SERPL-SCNC: 135 MMOL/L (ref 136–145)
WBC # BLD AUTO: 18.5 K/UL (ref 4.1–11.1)

## 2023-03-13 PROCEDURE — 74011250636 HC RX REV CODE- 250/636: Performed by: INTERNAL MEDICINE

## 2023-03-13 PROCEDURE — 36415 COLL VENOUS BLD VENIPUNCTURE: CPT

## 2023-03-13 PROCEDURE — 94640 AIRWAY INHALATION TREATMENT: CPT

## 2023-03-13 PROCEDURE — 74011000250 HC RX REV CODE- 250: Performed by: INTERNAL MEDICINE

## 2023-03-13 PROCEDURE — 80048 BASIC METABOLIC PNL TOTAL CA: CPT

## 2023-03-13 PROCEDURE — 85025 COMPLETE CBC W/AUTO DIFF WBC: CPT

## 2023-03-13 PROCEDURE — 74011250637 HC RX REV CODE- 250/637: Performed by: INTERNAL MEDICINE

## 2023-03-13 RX ORDER — PREDNISONE 2.5 MG/1
TABLET ORAL
Qty: 75 TABLET | Refills: 0 | Status: SHIPPED | OUTPATIENT
Start: 2023-03-13 | End: 2023-04-02

## 2023-03-13 RX ORDER — LOSARTAN POTASSIUM 100 MG/1
100 TABLET ORAL DAILY
Qty: 30 TABLET | Refills: 0 | Status: SHIPPED | OUTPATIENT
Start: 2023-03-13 | End: 2023-04-12

## 2023-03-13 RX ORDER — FLUTICASONE PROPIONATE 50 MCG
2 SPRAY, SUSPENSION (ML) NASAL 2 TIMES DAILY
Status: DISCONTINUED | OUTPATIENT
Start: 2023-03-13 | End: 2023-03-13 | Stop reason: HOSPADM

## 2023-03-13 RX ADMIN — LOSARTAN POTASSIUM 100 MG: 50 TABLET, FILM COATED ORAL at 08:28

## 2023-03-13 RX ADMIN — FINASTERIDE 5 MG: 5 TABLET, FILM COATED ORAL at 09:45

## 2023-03-13 RX ADMIN — METHYLPREDNISOLONE SODIUM SUCCINATE 40 MG: 40 INJECTION, POWDER, FOR SOLUTION INTRAMUSCULAR; INTRAVENOUS at 05:57

## 2023-03-13 RX ADMIN — FLUTICASONE PROPIONATE 2 SPRAY: 50 SPRAY, METERED NASAL at 08:29

## 2023-03-13 RX ADMIN — TIOTROPIUM BROMIDE INHALATION SPRAY 2 PUFF: 3.12 SPRAY, METERED RESPIRATORY (INHALATION) at 07:36

## 2023-03-13 RX ADMIN — SPIRONOLACTONE 25 MG: 25 TABLET ORAL at 08:28

## 2023-03-13 RX ADMIN — TAMSULOSIN HYDROCHLORIDE 0.4 MG: 0.4 CAPSULE ORAL at 08:28

## 2023-03-13 RX ADMIN — Medication 1000 UNITS: at 08:27

## 2023-03-13 RX ADMIN — BUDESONIDE AND FORMOTEROL FUMARATE DIHYDRATE 2 PUFF: 160; 4.5 AEROSOL RESPIRATORY (INHALATION) at 07:36

## 2023-03-13 RX ADMIN — SODIUM CHLORIDE, PRESERVATIVE FREE 10 ML: 5 INJECTION INTRAVENOUS at 05:57

## 2023-03-13 RX ADMIN — PANTOPRAZOLE SODIUM 40 MG: 40 TABLET, DELAYED RELEASE ORAL at 08:28

## 2023-03-13 RX ADMIN — AMLODIPINE BESYLATE 10 MG: 5 TABLET ORAL at 08:28

## 2023-03-13 RX ADMIN — ENOXAPARIN SODIUM 40 MG: 100 INJECTION SUBCUTANEOUS at 08:28

## 2023-03-13 NOTE — PROGRESS NOTES
Discharge instructions reviewed with patient and wife. Patient stated no further questions. Vital signs stable and patient is in no signs of distress. Prescriptions sent to pharmacy. Follow up appointment made. Patient wheeled down by staff and left in private vehicle with wife.

## 2023-03-13 NOTE — PROGRESS NOTES
Reason for Admission:  Asthma attack                   RUR Score:  8%                   Plan for utilizing home health:          PCP: First and Last name:  Klaus Tanner MD     Name of Practice:    Are you a current patient: Yes/No:    Approximate date of last visit: 3 months ago   Can you participate in a virtual visit with your PCP:                     Current Advanced Directive/Advance Care Plan: Full Code    Healthcare Decision Maker:   Click here to complete [id number] including selection of the Healthcare Decision Maker Relationship (ie \"Primary\")           Spouse: Peter Sal- [phone]                  Transition of Care Plan: CM met with patient and spouse at bedside to complete assessment. Address verified. Patient lives at home with his spouse. DME: none. Patient drives and is independent in all ADL's and IADL's. DCP: home and spouse will provide transportation upon DC. DC order noted. Discharge plan of care/case management plan validated with provider discharge order.

## 2023-03-13 NOTE — PROGRESS NOTES
Patient has discharge order for today. Patient is being discharged to home self care. Patient has been cleared by attending provider. Patient is not being discharged with IV, telemetry or cueva catheter. Vital signs stable and patient is in no signs of distress. Discharge plan of care/case management plan validated with provider discharge order.

## 2023-03-13 NOTE — DISCHARGE SUMMARY
Hospitalist Discharge Summary     Patient ID:    Kimmie Rascon  [id number]  36 y.o.  1955    Admit date: 3/11/2023    Discharge date : 3/13/2023      Final Diagnoses: Active Problems:    Asthma attack (3/11/2023)        Reason for Hospitalization/Hospital Course:   Kimmie Rascon is a 69 y.o. male with PMH of hypertension, HLP, asthma, chronic sinusitis, post-nasal drip and other medical problems below. He presented tot he ED with chief complaint of shortness of breath started today. Associated with wheezing, productive cough. No fever or chills. He reports symptoms worsened with post-nasal drip and exertion. In addition, he reports having moderate sharp right upper arm pain, otherwise denies extremity weakness. No other focal neurology findings. In the ED, noted tachycardia, normotensive. Chest X-ray no acute findings. Admitted for further management of asthma/COPD exacerbation. Started on IV Solu-Medrol 40 mg every 8 hours and IV azithromycin 500 mg daily. Weaned off oxygen. Breathing comfortably on room air. He is stable for discharge home. Discussed importance of follow-up with ENT and PCP. We will refer him to pulmonary specialist for chronic asthma. Advised eliminating environmental triggers of asthma from household. We will discharge with steroid taper. Discharge Medications:   Current Discharge Medication List        CONTINUE these medications which have CHANGED    Details   predniSONE (DELTASONE) 2.5 mg tablet Take 8 Tablets by mouth daily (with breakfast) for 5 days, THEN 4 Tablets daily (with breakfast) for 5 days, THEN 2 Tablets daily (with breakfast) for 5 days, THEN 1 Tablet daily (with breakfast) for 5 days. Qty: 75 Tablet, Refills: 0  Start date: 3/13/2023, End date: 4/2/2023      losartan (COZAAR) 100 mg tablet Take 1 Tablet by mouth daily for 30 days.   Qty: 30 Tablet, Refills: 0  Start date: 3/13/2023, End date: 4/12/2023           CONTINUE these medications which have NOT CHANGED    Details   cholecalciferol (VITAMIN D3) (1000 Units /25 mcg) tablet TAKE 1 TABLET BY MOUTH EVERY DAY  Qty: 90 Tablet, Refills: 2    Associated Diagnoses: Vitamin D deficiency, unspecified; Vitamin D deficiency      albuterol-ipratropium (DUO-NEB) 2.5 mg-0.5 mg/3 ml nebu USE 1 VIAL IN NEBULIZER 4 TIMES A DAY  Qty: 360 mL, Refills: 1      fluticasone-umeclidinium-vilanterol (Trelegy Ellipta) 100-62.5-25 mcg inhaler Take 1 Puff by inhalation daily. multivit-min/FA/lycopen/lutein (CENTRUM SILVER MEN PO) Take 1 Tablet by mouth daily. amLODIPine (NORVASC) 10 mg tablet TAKE 1 (ONE) TABLET AT NIGHT  Qty: 90 Tablet, Refills: 1    Comments: DX Code Needed  . Associated Diagnoses: Essential (primary) hypertension; Benign essential hypertension      dupilumab (Dupixent Syringe) 300 mg/2 mL syrg syringe 2 mL by SubCUTAneous route every fourteen (14) days.   Qty: 4 mL, Refills: 3      albuterol (PROVENTIL HFA, VENTOLIN HFA, PROAIR HFA) 90 mcg/actuation inhaler INHALE 1 PUFF EVERY 6 HOURS AS NEEDED  Qty: 8.5 Each, Refills: 1      traMADoL (ULTRAM) 50 mg tablet tramadol 50 mg tablet   TAKE 1 - 2 TABLETS EVERY 4 - 6 HOURS AS NEEDED FOR SEVERE PAIN      spironolactone (ALDACTONE) 25 mg tablet spironolactone 25 mg tablet      potassium chloride SR (KLOR-CON 10) 10 mEq tablet Klor-Con 10 mEq tablet,extended release   TAKE 1 TABLET BY MOUTH EVERY DAY      pantoprazole (PROTONIX) 40 mg tablet pantoprazole 40 mg tablet,delayed release      omeprazole (PRILOSEC) 40 mg capsule omeprazole 40 mg capsule,delayed release      montelukast (SINGULAIR) 10 mg tablet montelukast 10 mg tablet   TAKE 1 TABLET BY MOUTH EVERY DAY      mometasone-formoterol (DULERA) 100-5 mcg/actuation HFA inhaler Dulera 100 mcg-5 mcg/actuation HFA aerosol inhaler   INHALE 2 PUFFS BY MOUTH TWICE A DAY      metoclopramide HCl (REGLAN) 10 mg tablet metoclopramide 10 mg tablet      fluticasone propionate (FLONASE) 50 mcg/actuation nasal spray fluticasone propionate 50 mcg/actuation nasal spray,suspension      finasteride (PROSCAR) 5 mg tablet Take 5 mg by mouth daily. budesonide (PULMICORT) 0.5 mg/2 mL nbsp budesonide 0.5 mg/2 mL suspension for nebulization      budesonide-formoteroL (SYMBICORT) 160-4.5 mcg/actuation HFAA Symbicort 160 mcg-4.5 mcg/actuation HFA aerosol inhaler   INHALE 2 PUFFS BY MOUTH TWICE A DAY (MORNING AND EVENING)      diclofenac (VOLTAREN) 1 % gel Apply  to affected area four (4) times daily. Qty: 50 g, Refills: 0      tamsulosin (FLOMAX) 0.4 mg capsule tamsulosin 0.4 mg capsule           STOP taking these medications       fluticasone furoate-vilanteroL (BREO ELLIPTA) 100-25 mcg/dose inhaler Comments:   Reason for Stopping:         fluticasone propion-salmeteroL (ADVAIR/WIXELA) 250-50 mcg/dose diskus inhaler Comments:   Reason for Stopping:         amoxicillin-clavulanate (Augmentin) 875-125 mg per tablet Comments:   Reason for Stopping:         dextromethorphan-guaiFENesin (Robitussin Cough-Chest Tim DM) 5-100 mg/5 mL liqd Comments:   Reason for Stopping:         fluticasone furoate-vilanteroL (Breo Ellipta) 200-25 mcg/dose inhaler Comments:   Reason for Stopping: Follow up Care:    1. Yahir Garcia MD in 1-2 weeks. Follow-up Information       Follow up With Specialties Details Why Contact Info    Yahir Garcia MD Internal Medicine Physician Follow up in 1 week(s)  [facility] 4      Frederick Reynolds MD Pulmonary Disease Follow up in 1 week(s) Uncontrolled asthma with chronic rhinosinusitis [address] [facility]  [phone]                Patient Follow Up Instructions:    Activity: Activity as tolerated  Diet:  Cardiac Diet  Wound Care: None needed     Condition at Discharge:  Stable  __________________________________________________________________    Disposition  Home or Self Care  ____________________________________________________________________    Code Status:  Full Code  ___________________________________________________________________    Discharge Exam:  Patient seen and examined by me on discharge day. Pertinent Findings:  Gen:    Not in distress  Chest: Clear lungs  CVS:   Regular rhythm. No edema  Abd:  Soft, not distended, not tender  Neuro:  Alert        CONSULTATIONS: None    Significant Diagnostic Studies:   Recent Results (from the past 24 hour(s))   CBC WITH AUTOMATED DIFF    Collection Time: 03/13/23  9:57 AM   Result Value Ref Range    WBC 18.5 (H) 4.1 - 11.1 K/uL    RBC 4.89 4. 10 - 5.70 M/uL    HGB 14.1 12.1 - 17.0 g/dL    HCT 41.6 36.6 - 50.3 %    MCV 85.1 80.0 - 99.0 FL    MCH 28.8 26.0 - 34.0 PG    MCHC 33.9 30.0 - 36.5 g/dL    RDW 12.7 11.5 - 14.5 %    PLATELET 939 226 - 709 K/uL    MPV 9.8 8.9 - 12.9 FL    NRBC 0.0 0.0  WBC    ABSOLUTE NRBC 0.00 0.00 - 0.01 K/uL    NEUTROPHILS 92 (H) 32 - 75 %    LYMPHOCYTES 4 (L) 12 - 49 %    MONOCYTES 3 (L) 5 - 13 %    EOSINOPHILS 0 0 - 7 %    BASOPHILS 0 0 - 1 %    IMMATURE GRANULOCYTES 1 (H) 0 - 0.5 %    ABS. NEUTROPHILS 17.1 (H) 1.8 - 8.0 K/UL    ABS. LYMPHOCYTES 0.8 0.8 - 3.5 K/UL    ABS. MONOCYTES 0.5 0.0 - 1.0 K/UL    ABS. EOSINOPHILS 0.0 0.0 - 0.4 K/UL    ABS. BASOPHILS 0.0 0.0 - 0.1 K/UL    ABS. IMM. GRANS. 0.1 (H) 0.00 - 0.04 K/UL    DF AUTOMATED     METABOLIC PANEL, BASIC    Collection Time: 03/13/23  9:57 AM   Result Value Ref Range    Sodium 135 (L) 136 - 145 mmol/L    Potassium 3.7 3.5 - 5.1 mmol/L    Chloride 105 97 - 108 mmol/L    CO2 24 21 - 32 mmol/L    Anion gap 6 5 - 15 mmol/L    Glucose 148 (H) 65 - 100 mg/dL    BUN 20 6 - 20 mg/dL    Creatinine 0.93 0.70 - 1.30 mg/dL    BUN/Creatinine ratio 22 (H) 12 - 20      eGFR >60 >60 ml/min/1.73m2    Calcium 9.0 8.5 - 10.1 mg/dL     XR CHEST PORT   Final Result   No acute cardiopulmonary process.              Time spent in direct and indirect care including coordination of services: Greater than 35 minutes    Signed:  Adelita Darby NP  3/13/2023  1:26 PM

## 2023-03-13 NOTE — PROGRESS NOTES
Problem: Gas Exchange - Impaired  Goal: *Absence of hypoxia  Outcome: Progressing Towards Goal  Note: Patient is on room air and his O2 saturations are remaining above 90%. Patient does report that he is still experiencing SOB when he lays down. Bedside shift change report given to NAYELI Pena (oncoming nurse) by Franny Meeks RN (offgoing nurse). Report included the following information SBAR, Kardex, ED Summary, Intake/Output, MAR, Accordion, Recent Results, and Cardiac Rhythm Sinus Tach/NSR .

## 2023-04-04 ENCOUNTER — TELEPHONE (OUTPATIENT)
Dept: ENT CLINIC | Age: 68
End: 2023-04-04

## 2023-04-26 ENCOUNTER — TELEPHONE (OUTPATIENT)
Dept: ENT CLINIC | Age: 68
End: 2023-04-26

## 2023-04-27 NOTE — TELEPHONE ENCOUNTER
I attempted to return patients call, no answer and vmail not set up. Call came in with a request for the number to the specialty pharmacy, I need to confirm if they need a refill of the medication.

## 2023-05-21 RX ORDER — METOCLOPRAMIDE 10 MG/1
TABLET ORAL
COMMUNITY
End: 2023-06-01

## 2023-05-21 RX ORDER — ALBUTEROL SULFATE 90 UG/1
AEROSOL, METERED RESPIRATORY (INHALATION)
COMMUNITY
Start: 2022-10-30 | End: 2023-06-01 | Stop reason: SDUPTHER

## 2023-05-21 RX ORDER — MONTELUKAST SODIUM 10 MG/1
TABLET ORAL
COMMUNITY
End: 2023-06-01

## 2023-05-21 RX ORDER — FINASTERIDE 5 MG/1
5 TABLET, FILM COATED ORAL DAILY
COMMUNITY
Start: 2022-05-11

## 2023-05-21 RX ORDER — DUPILUMAB 300 MG/2ML
300 INJECTION, SOLUTION SUBCUTANEOUS
COMMUNITY
Start: 2022-12-06

## 2023-05-21 RX ORDER — TAMSULOSIN HYDROCHLORIDE 0.4 MG/1
CAPSULE ORAL
COMMUNITY
End: 2023-06-01

## 2023-05-21 RX ORDER — PANTOPRAZOLE SODIUM 40 MG/1
TABLET, DELAYED RELEASE ORAL
COMMUNITY
End: 2023-06-01

## 2023-05-21 RX ORDER — AMLODIPINE BESYLATE 10 MG/1
TABLET ORAL
COMMUNITY
Start: 2023-01-13 | End: 2023-06-01 | Stop reason: SDUPTHER

## 2023-05-21 RX ORDER — FLUTICASONE FUROATE, UMECLIDINIUM BROMIDE AND VILANTEROL TRIFENATATE 100; 62.5; 25 UG/1; UG/1; UG/1
1 POWDER RESPIRATORY (INHALATION) DAILY
COMMUNITY
End: 2023-06-01

## 2023-05-21 RX ORDER — POTASSIUM CHLORIDE 750 MG/1
TABLET, FILM COATED, EXTENDED RELEASE ORAL
COMMUNITY
End: 2023-06-01

## 2023-05-21 RX ORDER — IPRATROPIUM BROMIDE AND ALBUTEROL SULFATE 2.5; .5 MG/3ML; MG/3ML
SOLUTION RESPIRATORY (INHALATION)
COMMUNITY
Start: 2023-02-15 | End: 2023-06-01 | Stop reason: SDUPTHER

## 2023-05-21 RX ORDER — TRAMADOL HYDROCHLORIDE 50 MG/1
TABLET ORAL
COMMUNITY
End: 2023-06-01

## 2023-05-21 RX ORDER — BUDESONIDE 0.5 MG/2ML
INHALANT ORAL
COMMUNITY
End: 2023-06-01 | Stop reason: SDUPTHER

## 2023-05-21 RX ORDER — SPIRONOLACTONE 25 MG/1
TABLET ORAL
COMMUNITY
End: 2023-06-01

## 2023-05-21 RX ORDER — OMEPRAZOLE 40 MG/1
CAPSULE, DELAYED RELEASE ORAL
COMMUNITY
End: 2023-06-01

## 2023-05-21 RX ORDER — FLUTICASONE PROPIONATE 50 MCG
SPRAY, SUSPENSION (ML) NASAL
COMMUNITY
End: 2023-06-01 | Stop reason: SDUPTHER

## 2023-06-01 ENCOUNTER — OFFICE VISIT (OUTPATIENT)
Facility: CLINIC | Age: 68
End: 2023-06-01
Payer: MEDICARE

## 2023-06-01 VITALS
TEMPERATURE: 98.3 F | HEART RATE: 96 BPM | OXYGEN SATURATION: 96 % | DIASTOLIC BLOOD PRESSURE: 86 MMHG | SYSTOLIC BLOOD PRESSURE: 130 MMHG | HEIGHT: 70 IN | BODY MASS INDEX: 28.06 KG/M2 | WEIGHT: 196 LBS

## 2023-06-01 DIAGNOSIS — Z11.59 NEED FOR HEPATITIS C SCREENING TEST: ICD-10-CM

## 2023-06-01 DIAGNOSIS — R73.09 ELEVATED GLUCOSE: ICD-10-CM

## 2023-06-01 DIAGNOSIS — J33.9 NASAL POLYPS: ICD-10-CM

## 2023-06-01 DIAGNOSIS — J45.909 MODERATE ASTHMA WITHOUT COMPLICATION, UNSPECIFIED WHETHER PERSISTENT: ICD-10-CM

## 2023-06-01 DIAGNOSIS — E78.00 HYPERCHOLESTEROLEMIA: ICD-10-CM

## 2023-06-01 DIAGNOSIS — I10 ESSENTIAL HYPERTENSION, BENIGN: Primary | ICD-10-CM

## 2023-06-01 DIAGNOSIS — Z12.11 COLON CANCER SCREENING: ICD-10-CM

## 2023-06-01 PROBLEM — J32.9 CHRONIC SINUSITIS: Status: ACTIVE | Noted: 2023-06-01

## 2023-06-01 PROBLEM — H61.20 IMPACTED CERUMEN: Status: ACTIVE | Noted: 2023-06-01

## 2023-06-01 PROBLEM — R06.00 DYSPNEA: Status: ACTIVE | Noted: 2023-06-01

## 2023-06-01 PROCEDURE — 99214 OFFICE O/P EST MOD 30 MIN: CPT | Performed by: INTERNAL MEDICINE

## 2023-06-01 PROCEDURE — 3017F COLORECTAL CA SCREEN DOC REV: CPT | Performed by: INTERNAL MEDICINE

## 2023-06-01 PROCEDURE — 1036F TOBACCO NON-USER: CPT | Performed by: INTERNAL MEDICINE

## 2023-06-01 PROCEDURE — G8419 CALC BMI OUT NRM PARAM NOF/U: HCPCS | Performed by: INTERNAL MEDICINE

## 2023-06-01 PROCEDURE — 3075F SYST BP GE 130 - 139MM HG: CPT | Performed by: INTERNAL MEDICINE

## 2023-06-01 PROCEDURE — G8427 DOCREV CUR MEDS BY ELIG CLIN: HCPCS | Performed by: INTERNAL MEDICINE

## 2023-06-01 PROCEDURE — 1123F ACP DISCUSS/DSCN MKR DOCD: CPT | Performed by: INTERNAL MEDICINE

## 2023-06-01 PROCEDURE — 3079F DIAST BP 80-89 MM HG: CPT | Performed by: INTERNAL MEDICINE

## 2023-06-01 RX ORDER — IPRATROPIUM BROMIDE AND ALBUTEROL SULFATE 2.5; .5 MG/3ML; MG/3ML
SOLUTION RESPIRATORY (INHALATION)
Qty: 360 ML | Refills: 3 | Status: SHIPPED | OUTPATIENT
Start: 2023-06-01

## 2023-06-01 RX ORDER — FLUTICASONE PROPIONATE 50 MCG
SPRAY, SUSPENSION (ML) NASAL
Qty: 16 G | Refills: 3 | Status: SHIPPED | OUTPATIENT
Start: 2023-06-01

## 2023-06-01 RX ORDER — ALBUTEROL SULFATE 90 UG/1
AEROSOL, METERED RESPIRATORY (INHALATION)
Qty: 18 G | Refills: 4 | Status: SHIPPED | OUTPATIENT
Start: 2023-06-01

## 2023-06-01 RX ORDER — BUDESONIDE 0.5 MG/2ML
INHALANT ORAL
Qty: 60 EACH | Refills: 2 | Status: SHIPPED | OUTPATIENT
Start: 2023-06-01

## 2023-06-01 RX ORDER — AMLODIPINE BESYLATE 10 MG/1
TABLET ORAL
Qty: 90 TABLET | Refills: 2 | Status: SHIPPED | OUTPATIENT
Start: 2023-06-01

## 2023-06-01 SDOH — ECONOMIC STABILITY: FOOD INSECURITY: WITHIN THE PAST 12 MONTHS, THE FOOD YOU BOUGHT JUST DIDN'T LAST AND YOU DIDN'T HAVE MONEY TO GET MORE.: NEVER TRUE

## 2023-06-01 SDOH — ECONOMIC STABILITY: FOOD INSECURITY: WITHIN THE PAST 12 MONTHS, YOU WORRIED THAT YOUR FOOD WOULD RUN OUT BEFORE YOU GOT MONEY TO BUY MORE.: NEVER TRUE

## 2023-06-01 SDOH — ECONOMIC STABILITY: HOUSING INSECURITY
IN THE LAST 12 MONTHS, WAS THERE A TIME WHEN YOU DID NOT HAVE A STEADY PLACE TO SLEEP OR SLEPT IN A SHELTER (INCLUDING NOW)?: NO

## 2023-06-01 SDOH — ECONOMIC STABILITY: INCOME INSECURITY: HOW HARD IS IT FOR YOU TO PAY FOR THE VERY BASICS LIKE FOOD, HOUSING, MEDICAL CARE, AND HEATING?: NOT HARD AT ALL

## 2023-06-01 ASSESSMENT — ANXIETY QUESTIONNAIRES
5. BEING SO RESTLESS THAT IT IS HARD TO SIT STILL: 0
3. WORRYING TOO MUCH ABOUT DIFFERENT THINGS: 0
1. FEELING NERVOUS, ANXIOUS, OR ON EDGE: 0
GAD7 TOTAL SCORE: 0
2. NOT BEING ABLE TO STOP OR CONTROL WORRYING: 0
IF YOU CHECKED OFF ANY PROBLEMS ON THIS QUESTIONNAIRE, HOW DIFFICULT HAVE THESE PROBLEMS MADE IT FOR YOU TO DO YOUR WORK, TAKE CARE OF THINGS AT HOME, OR GET ALONG WITH OTHER PEOPLE: NOT DIFFICULT AT ALL
7. FEELING AFRAID AS IF SOMETHING AWFUL MIGHT HAPPEN: 0
6. BECOMING EASILY ANNOYED OR IRRITABLE: 0
4. TROUBLE RELAXING: 0

## 2023-06-01 ASSESSMENT — PATIENT HEALTH QUESTIONNAIRE - PHQ9
SUM OF ALL RESPONSES TO PHQ QUESTIONS 1-9: 0
SUM OF ALL RESPONSES TO PHQ QUESTIONS 1-9: 0
2. FEELING DOWN, DEPRESSED OR HOPELESS: 0
SUM OF ALL RESPONSES TO PHQ QUESTIONS 1-9: 0
SUM OF ALL RESPONSES TO PHQ QUESTIONS 1-9: 0
SUM OF ALL RESPONSES TO PHQ9 QUESTIONS 1 & 2: 0
1. LITTLE INTEREST OR PLEASURE IN DOING THINGS: 0

## 2023-06-01 NOTE — PROGRESS NOTES
Chief Complaint   Patient presents with    Follow-up Chronic Condition         1. \"Have you been to the ER, urgent care clinic since your last visit? Hospitalized since your last visit? \"  [facility]    2. \"Have you seen or consulted any other health care providers outside of the [address] [place] [address] since your last visit? \"  Dr. Jeremy Hawley      3. For patients aged 39-70: Has the patient had a colonoscopy / FIT/ Cologuard? No      If the patient is female:    4. For patients aged 41-77: Has the patient had a mammogram within the past 2 years? NA - based on age or sex      11. For patients aged 21-65: Has the patient had a pap smear?  NA - based on age or sex

## 2023-06-01 NOTE — PROGRESS NOTES
[facility] Internal [facility]  [address]  [place], [address] [facility]  Phone: [phone]      Ramila Lew (: 1955) is a 69 y.o. male, established patient, here for evaluation of the following chief complaint(s):  Follow-up Chronic Condition and ED Follow-up     SUBJECTIVE/OBJECTIVE:  HPI:  Patient is here for follow up, he did not have routine for this visit. He has his wife Chloé Shaikh with him today. He needs a refill on Amlodipine and Albuterol inhaler. He had a consult with Dr. Chavo Chaney, he was told he need an injection but it was too expensive. He has not been been in the ER or the hospital since his last visit. He was seen at [facility]  in March for asthma attack. He had blood work and chest x-ray. Prior to Admission medications    Medication Sig Start Date End Date Taking?  Authorizing Provider   budesonide (PULMICORT) 0.5 MG/2ML nebulizer suspension budesonide 0.5 mg/2 mL suspension for nebulization 23  Yes Frank Johnson MD   amLODIPine (NORVASC) 10 MG tablet TAKE 1 (ONE) TABLET AT NIGHT 23  Yes Frank Johnson MD   albuterol sulfate HFA (PROVENTIL;VENTOLIN;PROAIR) 108 (90 Base) MCG/ACT inhaler INHALE 1 PUFF EVERY 6 HOURS AS NEEDED 23  Yes Frank Johnson MD   fluticasone (FLONASE) 50 MCG/ACT nasal spray fluticasone propionate 50 mcg/actuation nasal spray,suspension 23  Yes Frank Johnson MD   ipratropium 0.5 mg-albuterol 2.5 mg (DUONEB) 0.5-2.5 (3) MG/3ML SOLN nebulizer solution USE 1 VIAL IN NEBULIZER 4 TIMES A DAY 23  Yes Frank Johnson MD   vitamin D (CHOLECALCIFEROL) 25 MCG (1000 UT) TABS tablet Take 1 tablet by mouth daily 23  Yes Frank Johnson MD   diclofenac sodium (VOLTAREN) 1 % GEL Apply topically 4 times daily 22  Yes Ar Automatic Reconciliation   finasteride (PROSCAR) 5 MG tablet Take 1 tablet by mouth daily 22  Yes Ar Automatic Reconciliation   dupilumab (DUPIXENT) 300 MG/2ML SOSY injection Inject 2 mLs into the

## 2023-08-01 RX ORDER — IPRATROPIUM BROMIDE AND ALBUTEROL SULFATE 2.5; .5 MG/3ML; MG/3ML
SOLUTION RESPIRATORY (INHALATION)
Qty: 360 ML | Refills: 3 | Status: SHIPPED | OUTPATIENT
Start: 2023-08-01

## 2023-11-22 RX ORDER — FLUTICASONE PROPIONATE 50 MCG
SPRAY, SUSPENSION (ML) NASAL
Qty: 48 G | Refills: 0 | Status: SHIPPED | OUTPATIENT
Start: 2023-11-22

## 2023-12-01 RX ORDER — FLUTICASONE PROPIONATE 50 MCG
SPRAY, SUSPENSION (ML) NASAL
Qty: 48 G | Refills: 0 | OUTPATIENT
Start: 2023-12-01

## 2023-12-12 ENCOUNTER — HOSPITAL ENCOUNTER (EMERGENCY)
Facility: HOSPITAL | Age: 68
Discharge: HOME | End: 2023-12-12
Attending: STUDENT IN AN ORGANIZED HEALTH CARE EDUCATION/TRAINING PROGRAM
Payer: MEDICARE

## 2023-12-12 ENCOUNTER — APPOINTMENT (OUTPATIENT)
Facility: HOSPITAL | Age: 68
End: 2023-12-12
Payer: MEDICARE

## 2023-12-12 VITALS
RESPIRATION RATE: 22 BRPM | DIASTOLIC BLOOD PRESSURE: 70 MMHG | WEIGHT: 200 LBS | HEIGHT: 70 IN | BODY MASS INDEX: 28.63 KG/M2 | OXYGEN SATURATION: 94 % | TEMPERATURE: 97.7 F | SYSTOLIC BLOOD PRESSURE: 123 MMHG | HEART RATE: 86 BPM

## 2023-12-12 DIAGNOSIS — J45.901 ASTHMA WITH ACUTE EXACERBATION, UNSPECIFIED ASTHMA SEVERITY, UNSPECIFIED WHETHER PERSISTENT: Primary | ICD-10-CM

## 2023-12-12 LAB
ALBUMIN SERPL-MCNC: 3.6 G/DL (ref 3.5–5)
ALBUMIN/GLOB SERPL: 0.9 (ref 1.1–2.2)
ALP SERPL-CCNC: 101 U/L (ref 45–117)
ALT SERPL-CCNC: 22 U/L (ref 12–78)
ANION GAP SERPL CALC-SCNC: 5 MMOL/L (ref 5–15)
APPEARANCE UR: CLEAR
AST SERPL W P-5'-P-CCNC: 13 U/L (ref 15–37)
BACTERIA URNS QL MICRO: NEGATIVE /HPF
BASOPHILS # BLD: 0.1 K/UL (ref 0–0.1)
BASOPHILS NFR BLD: 1 % (ref 0–1)
BILIRUB SERPL-MCNC: 0.4 MG/DL (ref 0.2–1)
BILIRUB UR QL: NEGATIVE
BNP SERPL-MCNC: 35 PG/ML
BUN SERPL-MCNC: 19 MG/DL (ref 6–20)
BUN/CREAT SERPL: 23 (ref 12–20)
CA-I BLD-MCNC: 8.8 MG/DL (ref 8.5–10.1)
CHLORIDE SERPL-SCNC: 107 MMOL/L (ref 97–108)
CO2 SERPL-SCNC: 27 MMOL/L (ref 21–32)
COLOR UR: NORMAL
CREAT SERPL-MCNC: 0.81 MG/DL (ref 0.7–1.3)
DIFFERENTIAL METHOD BLD: ABNORMAL
EOSINOPHIL # BLD: 0.8 K/UL (ref 0–0.4)
EOSINOPHIL NFR BLD: 9 % (ref 0–7)
EPITH CASTS URNS QL MICRO: NORMAL /LPF
ERYTHROCYTE [DISTWIDTH] IN BLOOD BY AUTOMATED COUNT: 12.4 % (ref 11.5–14.5)
GLOBULIN SER CALC-MCNC: 4.1 G/DL (ref 2–4)
GLUCOSE SERPL-MCNC: 102 MG/DL (ref 65–100)
GLUCOSE UR STRIP.AUTO-MCNC: NEGATIVE MG/DL
HCT VFR BLD AUTO: 41.5 % (ref 36.6–50.3)
HGB BLD-MCNC: 14 G/DL (ref 12.1–17)
HGB UR QL STRIP: NEGATIVE
IMM GRANULOCYTES # BLD AUTO: 0 K/UL (ref 0–0.04)
IMM GRANULOCYTES NFR BLD AUTO: 0 % (ref 0–0.5)
KETONES UR QL STRIP.AUTO: NEGATIVE MG/DL
LEUKOCYTE ESTERASE UR QL STRIP.AUTO: NEGATIVE
LYMPHOCYTES # BLD: 2.2 K/UL (ref 0.8–3.5)
LYMPHOCYTES NFR BLD: 25 % (ref 12–49)
MAGNESIUM SERPL-MCNC: 2 MG/DL (ref 1.6–2.4)
MCH RBC QN AUTO: 28.8 PG (ref 26–34)
MCHC RBC AUTO-ENTMCNC: 33.7 G/DL (ref 30–36.5)
MCV RBC AUTO: 85.4 FL (ref 80–99)
MONOCYTES # BLD: 0.5 K/UL (ref 0–1)
MONOCYTES NFR BLD: 5 % (ref 5–13)
NEUTS SEG # BLD: 5.1 K/UL (ref 1.8–8)
NEUTS SEG NFR BLD: 60 % (ref 32–75)
NITRITE UR QL STRIP.AUTO: NEGATIVE
NRBC # BLD: 0 K/UL (ref 0–0.01)
NRBC BLD-RTO: 0 PER 100 WBC
PH UR STRIP: 7 (ref 5–8)
PLATELET # BLD AUTO: 181 K/UL (ref 150–400)
PMV BLD AUTO: 9.1 FL (ref 8.9–12.9)
POTASSIUM SERPL-SCNC: 3.6 MMOL/L (ref 3.5–5.1)
PROT SERPL-MCNC: 7.7 G/DL (ref 6.4–8.2)
PROT UR STRIP-MCNC: NEGATIVE MG/DL
RBC # BLD AUTO: 4.86 M/UL (ref 4.1–5.7)
RBC #/AREA URNS HPF: NORMAL /HPF (ref 0–5)
SODIUM SERPL-SCNC: 139 MMOL/L (ref 136–145)
SP GR UR REFRACTOMETRY: <1.005 (ref 1–1.03)
URINE CULTURE IF INDICATED: NORMAL
UROBILINOGEN UR QL STRIP.AUTO: 0.1 EU/DL (ref 0.1–1)
WBC # BLD AUTO: 8.6 K/UL (ref 4.1–11.1)
WBC URNS QL MICRO: NORMAL /HPF (ref 0–4)

## 2023-12-12 PROCEDURE — 36415 COLL VENOUS BLD VENIPUNCTURE: CPT

## 2023-12-12 PROCEDURE — 85025 COMPLETE CBC W/AUTO DIFF WBC: CPT

## 2023-12-12 PROCEDURE — 81001 URINALYSIS AUTO W/SCOPE: CPT

## 2023-12-12 PROCEDURE — 83735 ASSAY OF MAGNESIUM: CPT

## 2023-12-12 PROCEDURE — 6370000000 HC RX 637 (ALT 250 FOR IP): Performed by: STUDENT IN AN ORGANIZED HEALTH CARE EDUCATION/TRAINING PROGRAM

## 2023-12-12 PROCEDURE — 94760 N-INVAS EAR/PLS OXIMETRY 1: CPT

## 2023-12-12 PROCEDURE — 93005 ELECTROCARDIOGRAM TRACING: CPT | Performed by: STUDENT IN AN ORGANIZED HEALTH CARE EDUCATION/TRAINING PROGRAM

## 2023-12-12 PROCEDURE — 83880 ASSAY OF NATRIURETIC PEPTIDE: CPT

## 2023-12-12 PROCEDURE — 80053 COMPREHEN METABOLIC PANEL: CPT

## 2023-12-12 PROCEDURE — 71045 X-RAY EXAM CHEST 1 VIEW: CPT

## 2023-12-12 PROCEDURE — 94640 AIRWAY INHALATION TREATMENT: CPT

## 2023-12-12 PROCEDURE — 6360000002 HC RX W HCPCS: Performed by: STUDENT IN AN ORGANIZED HEALTH CARE EDUCATION/TRAINING PROGRAM

## 2023-12-12 PROCEDURE — 99285 EMERGENCY DEPT VISIT HI MDM: CPT

## 2023-12-12 RX ORDER — ALBUTEROL SULFATE 90 UG/1
AEROSOL, METERED RESPIRATORY (INHALATION)
Qty: 18 G | Refills: 1 | Status: SHIPPED | OUTPATIENT
Start: 2023-12-12

## 2023-12-12 RX ORDER — BUDESONIDE 0.5 MG/2ML
INHALANT ORAL
Qty: 60 EACH | Refills: 1 | Status: SHIPPED | OUTPATIENT
Start: 2023-12-12

## 2023-12-12 RX ORDER — PREDNISONE 20 MG/1
60 TABLET ORAL ONCE
Status: COMPLETED | OUTPATIENT
Start: 2023-12-12 | End: 2023-12-12

## 2023-12-12 RX ORDER — IPRATROPIUM BROMIDE AND ALBUTEROL SULFATE 2.5; .5 MG/3ML; MG/3ML
SOLUTION RESPIRATORY (INHALATION)
Qty: 360 ML | Refills: 1 | Status: SHIPPED | OUTPATIENT
Start: 2023-12-12

## 2023-12-12 RX ORDER — PREDNISONE 20 MG/1
40 TABLET ORAL DAILY
Qty: 8 TABLET | Refills: 0 | Status: SHIPPED | OUTPATIENT
Start: 2023-12-12 | End: 2023-12-16

## 2023-12-12 RX ORDER — IPRATROPIUM BROMIDE AND ALBUTEROL SULFATE 2.5; .5 MG/3ML; MG/3ML
1 SOLUTION RESPIRATORY (INHALATION)
Status: COMPLETED | OUTPATIENT
Start: 2023-12-12 | End: 2023-12-12

## 2023-12-12 RX ADMIN — IPRATROPIUM BROMIDE AND ALBUTEROL SULFATE 1 DOSE: 2.5; .5 SOLUTION RESPIRATORY (INHALATION) at 20:05

## 2023-12-12 RX ADMIN — ALBUTEROL SULFATE 2.5 MG: 2.5 SOLUTION RESPIRATORY (INHALATION) at 20:19

## 2023-12-12 RX ADMIN — ALBUTEROL SULFATE 2.5 MG: 2.5 SOLUTION RESPIRATORY (INHALATION) at 20:05

## 2023-12-12 RX ADMIN — PREDNISONE 60 MG: 20 TABLET ORAL at 20:01

## 2023-12-12 ASSESSMENT — LIFESTYLE VARIABLES
HOW OFTEN DO YOU HAVE A DRINK CONTAINING ALCOHOL: MONTHLY OR LESS
HOW MANY STANDARD DRINKS CONTAINING ALCOHOL DO YOU HAVE ON A TYPICAL DAY: 1 OR 2

## 2023-12-12 ASSESSMENT — PAIN - FUNCTIONAL ASSESSMENT: PAIN_FUNCTIONAL_ASSESSMENT: NONE - DENIES PAIN

## 2023-12-13 LAB
EKG ATRIAL RATE: 94 BPM
EKG DIAGNOSIS: NORMAL
EKG P AXIS: 49 DEGREES
EKG P-R INTERVAL: 160 MS
EKG Q-T INTERVAL: 368 MS
EKG QRS DURATION: 92 MS
EKG QTC CALCULATION (BAZETT): 460 MS
EKG R AXIS: -1 DEGREES
EKG T AXIS: 43 DEGREES
EKG VENTRICULAR RATE: 94 BPM

## 2023-12-13 NOTE — ED PROVIDER NOTES
[facility] EMERGENCY DEPT  EMERGENCY DEPARTMENT HISTORY AND PHYSICAL EXAM      Date: 12/12/2023  Patient Name: Janet Sellers  MRN: [id number]  [address] [facility] ([place] 1955  Date of evaluation: 12/12/2023  Provider: Olive Eid MD   Note Started: 7:27 PM EST 12/12/23    HISTORY OF PRESENT ILLNESS     Chief Complaint   Patient presents with    Shortness of Breath       History Provided By: Patient    HPI: Janet Sellers is a 77 y.o. male with PMH of asthma, HTN, bronchiectasis, GERD, HLD, HTN, and thrombocytopenia comes to the ED with shortness of breath. EMS noted the patient is hypoxic in route. He is given round of bronchodilators which has improved his shortness of breath and hypoxia. In the ED, patient continues to be complaining of chest tightness and shortness of breath. Denies any chest pain, fever, chills or sweats. Has been have intermittent coughing and nasal congestion. Denies any pain anywhere in his body. No changes in bowel, dater, urine habits at baseline. PAST MEDICAL HISTORY   Past Medical History:  Past Medical History:   Diagnosis Date    Asthma     Benign essential hypertension     Bronchiectasis without complication (HCC)     Elevated fasting blood sugar     Eosinophilia, unspecified type     GERD (gastroesophageal reflux disease)     Hypercholesterolemia     Hypertension     Mild anemia     Multinodular goiter     Obesity (BMI 30.0-34. 9)     Thrombocytopenia (HCC)     Thrombocytopenia (HCC)     Thyroid nodule     Vitamin D deficiency        Past Surgical History:  Past Surgical History:   Procedure Laterality Date    IR FNA WITH IMAGING  11/2/2022       Family History:  Family History   Problem Relation Age of Onset    High Blood Pressure Father     Hypertension Father        Social History:  Social History     Tobacco Use    Smoking status: Never    Smokeless tobacco: Never   Vaping Use    Vaping Use: Never used   Substance Use Topics    Alcohol use: Never    Drug use: Never

## 2023-12-13 NOTE — ED TRIAGE NOTES
Pt from home. Called from [employer] [place] otw home for SOB.  Pt has hx of asthma and doesn't know if he was diagnosed with COPD

## 2024-02-25 RX ORDER — AMLODIPINE BESYLATE 10 MG/1
TABLET ORAL
Qty: 90 TABLET | Refills: 0 | Status: SHIPPED | OUTPATIENT
Start: 2024-02-25

## 2024-05-04 RX ORDER — AMLODIPINE BESYLATE 10 MG/1
TABLET ORAL
Qty: 30 TABLET | Refills: 0 | Status: SHIPPED | OUTPATIENT
Start: 2024-05-04

## 2024-05-27 ENCOUNTER — APPOINTMENT (OUTPATIENT)
Facility: HOSPITAL | Age: 69
End: 2024-05-27
Payer: MEDICARE

## 2024-05-27 ENCOUNTER — HOSPITAL ENCOUNTER (OUTPATIENT)
Facility: HOSPITAL | Age: 69
Setting detail: OBSERVATION
Discharge: HOME | End: 2024-05-28
Attending: STUDENT IN AN ORGANIZED HEALTH CARE EDUCATION/TRAINING PROGRAM | Admitting: INTERNAL MEDICINE
Payer: MEDICARE

## 2024-05-27 PROBLEM — J40 BRONCHITIS: Status: ACTIVE | Noted: 2024-05-27

## 2024-05-27 LAB
ALBUMIN SERPL-MCNC: 3.7 G/DL (ref 3.5–5)
ALBUMIN/GLOB SERPL: 0.9 (ref 1.1–2.2)
ALP SERPL-CCNC: 125 U/L (ref 45–117)
ALT SERPL-CCNC: 21 U/L (ref 12–78)
ANION GAP SERPL CALC-SCNC: 6 MMOL/L (ref 5–15)
APPEARANCE UR: CLEAR
AST SERPL W P-5'-P-CCNC: 18 U/L (ref 15–37)
BACTERIA URNS QL MICRO: NEGATIVE /HPF
BASOPHILS # BLD: 0 K/UL (ref 0–0.1)
BASOPHILS NFR BLD: 0 % (ref 0–1)
BILIRUB SERPL-MCNC: 0.9 MG/DL (ref 0.2–1)
BILIRUB UR QL: NEGATIVE
BUN SERPL-MCNC: 20 MG/DL (ref 6–20)
BUN/CREAT SERPL: 19 (ref 12–20)
CA-I BLD-MCNC: 9.5 MG/DL (ref 8.5–10.1)
CHLORIDE SERPL-SCNC: 104 MMOL/L (ref 97–108)
CO2 SERPL-SCNC: 25 MMOL/L (ref 21–32)
COLOR UR: ABNORMAL
CREAT SERPL-MCNC: 1.03 MG/DL (ref 0.7–1.3)
D DIMER PPP FEU-MCNC: 0.38 UG/ML(FEU)
DIFFERENTIAL METHOD BLD: ABNORMAL
EKG ATRIAL RATE: 121 BPM
EKG DIAGNOSIS: NORMAL
EKG P AXIS: 42 DEGREES
EKG P-R INTERVAL: 154 MS
EKG Q-T INTERVAL: 298 MS
EKG QRS DURATION: 76 MS
EKG QTC CALCULATION (BAZETT): 423 MS
EKG R AXIS: -14 DEGREES
EKG T AXIS: 43 DEGREES
EKG VENTRICULAR RATE: 121 BPM
EOSINOPHIL # BLD: 0.4 K/UL (ref 0–0.4)
EOSINOPHIL NFR BLD: 3 % (ref 0–7)
ERYTHROCYTE [DISTWIDTH] IN BLOOD BY AUTOMATED COUNT: 12.1 % (ref 11.5–14.5)
FLUAV AG NPH QL IA: NEGATIVE
FLUBV AG NOSE QL IA: NEGATIVE
GLOBULIN SER CALC-MCNC: 4.1 G/DL (ref 2–4)
GLUCOSE SERPL-MCNC: 112 MG/DL (ref 65–100)
GLUCOSE UR STRIP.AUTO-MCNC: NEGATIVE MG/DL
HCT VFR BLD AUTO: 42 % (ref 36.6–50.3)
HGB BLD-MCNC: 14.1 G/DL (ref 12.1–17)
HGB UR QL STRIP: ABNORMAL
IMM GRANULOCYTES # BLD AUTO: 0.1 K/UL (ref 0–0.04)
IMM GRANULOCYTES NFR BLD AUTO: 0 % (ref 0–0.5)
KETONES UR QL STRIP.AUTO: 5 MG/DL
LACTATE BLD-SCNC: 1.62 MMOL/L (ref 0.4–2)
LEUKOCYTE ESTERASE UR QL STRIP.AUTO: NEGATIVE
LYMPHOCYTES # BLD: 1.7 K/UL (ref 0.8–3.5)
LYMPHOCYTES NFR BLD: 12 % (ref 12–49)
MAGNESIUM SERPL-MCNC: 1.7 MG/DL (ref 1.6–2.4)
MCH RBC QN AUTO: 28.4 PG (ref 26–34)
MCHC RBC AUTO-ENTMCNC: 33.6 G/DL (ref 30–36.5)
MCV RBC AUTO: 84.5 FL (ref 80–99)
MONOCYTES # BLD: 1 K/UL (ref 0–1)
MONOCYTES NFR BLD: 7 % (ref 5–13)
MUCOUS THREADS URNS QL MICRO: NEGATIVE /LPF
NEUTS SEG # BLD: 11.8 K/UL (ref 1.8–8)
NEUTS SEG NFR BLD: 78 % (ref 32–75)
NITRITE UR QL STRIP.AUTO: NEGATIVE
NRBC # BLD: 0 K/UL (ref 0–0.01)
NRBC BLD-RTO: 0 PER 100 WBC
PERFORMED BY:: NORMAL
PH UR STRIP: 7 (ref 5–8)
PLATELET # BLD AUTO: 165 K/UL (ref 150–400)
PMV BLD AUTO: 9.9 FL (ref 8.9–12.9)
POTASSIUM SERPL-SCNC: 4 MMOL/L (ref 3.5–5.1)
PROCALCITONIN SERPL-MCNC: <0.05 NG/ML
PROT SERPL-MCNC: 7.8 G/DL (ref 6.4–8.2)
PROT UR STRIP-MCNC: NEGATIVE MG/DL
RBC # BLD AUTO: 4.97 M/UL (ref 4.1–5.7)
RBC #/AREA URNS HPF: ABNORMAL /HPF (ref 0–5)
SARS-COV-2 RNA RESP QL NAA+PROBE: NOT DETECTED
SODIUM SERPL-SCNC: 135 MMOL/L (ref 136–145)
SP GR UR REFRACTOMETRY: 1.02 (ref 1–1.03)
SPECIMEN SOURCE: NORMAL
TROPONIN I SERPL HS-MCNC: 4 NG/L (ref 0–76)
URINE CULTURE IF INDICATED: ABNORMAL
UROBILINOGEN UR QL STRIP.AUTO: 0.1 EU/DL (ref 0.1–1)
WBC # BLD AUTO: 15 K/UL (ref 4.1–11.1)
WBC URNS QL MICRO: ABNORMAL /HPF (ref 0–4)

## 2024-05-27 PROCEDURE — 6370000000 HC RX 637 (ALT 250 FOR IP): Performed by: STUDENT IN AN ORGANIZED HEALTH CARE EDUCATION/TRAINING PROGRAM

## 2024-05-27 PROCEDURE — 96365 THER/PROPH/DIAG IV INF INIT: CPT

## 2024-05-27 PROCEDURE — 36415 COLL VENOUS BLD VENIPUNCTURE: CPT

## 2024-05-27 PROCEDURE — 2580000003 HC RX 258: Performed by: STUDENT IN AN ORGANIZED HEALTH CARE EDUCATION/TRAINING PROGRAM

## 2024-05-27 PROCEDURE — 71045 X-RAY EXAM CHEST 1 VIEW: CPT

## 2024-05-27 PROCEDURE — 2580000003 HC RX 258: Performed by: EMERGENCY MEDICINE

## 2024-05-27 PROCEDURE — 85379 FIBRIN DEGRADATION QUANT: CPT

## 2024-05-27 PROCEDURE — 87804 INFLUENZA ASSAY W/OPTIC: CPT

## 2024-05-27 PROCEDURE — 94640 AIRWAY INHALATION TREATMENT: CPT

## 2024-05-27 PROCEDURE — 83735 ASSAY OF MAGNESIUM: CPT

## 2024-05-27 PROCEDURE — 6370000000 HC RX 637 (ALT 250 FOR IP): Performed by: INTERNAL MEDICINE

## 2024-05-27 PROCEDURE — 87635 SARS-COV-2 COVID-19 AMP PRB: CPT

## 2024-05-27 PROCEDURE — G0378 HOSPITAL OBSERVATION PER HR: HCPCS

## 2024-05-27 PROCEDURE — 80053 COMPREHEN METABOLIC PANEL: CPT

## 2024-05-27 PROCEDURE — 81001 URINALYSIS AUTO W/SCOPE: CPT

## 2024-05-27 PROCEDURE — 94761 N-INVAS EAR/PLS OXIMETRY MLT: CPT

## 2024-05-27 PROCEDURE — 93005 ELECTROCARDIOGRAM TRACING: CPT | Performed by: STUDENT IN AN ORGANIZED HEALTH CARE EDUCATION/TRAINING PROGRAM

## 2024-05-27 PROCEDURE — 6360000002 HC RX W HCPCS: Performed by: EMERGENCY MEDICINE

## 2024-05-27 PROCEDURE — 2580000003 HC RX 258: Performed by: INTERNAL MEDICINE

## 2024-05-27 PROCEDURE — 84484 ASSAY OF TROPONIN QUANT: CPT

## 2024-05-27 PROCEDURE — 87040 BLOOD CULTURE FOR BACTERIA: CPT

## 2024-05-27 PROCEDURE — 99285 EMERGENCY DEPT VISIT HI MDM: CPT

## 2024-05-27 PROCEDURE — 71046 X-RAY EXAM CHEST 2 VIEWS: CPT

## 2024-05-27 PROCEDURE — 96375 TX/PRO/DX INJ NEW DRUG ADDON: CPT

## 2024-05-27 PROCEDURE — 85025 COMPLETE CBC W/AUTO DIFF WBC: CPT

## 2024-05-27 PROCEDURE — 83605 ASSAY OF LACTIC ACID: CPT

## 2024-05-27 PROCEDURE — 84145 PROCALCITONIN (PCT): CPT

## 2024-05-27 RX ORDER — ACETAMINOPHEN 325 MG/1
650 TABLET ORAL EVERY 6 HOURS PRN
Status: DISCONTINUED | OUTPATIENT
Start: 2024-05-27 | End: 2024-05-28 | Stop reason: HOSPADM

## 2024-05-27 RX ORDER — IPRATROPIUM BROMIDE AND ALBUTEROL SULFATE 2.5; .5 MG/3ML; MG/3ML
1 SOLUTION RESPIRATORY (INHALATION)
Status: DISCONTINUED | OUTPATIENT
Start: 2024-05-27 | End: 2024-05-27

## 2024-05-27 RX ORDER — IPRATROPIUM BROMIDE AND ALBUTEROL SULFATE 2.5; .5 MG/3ML; MG/3ML
1 SOLUTION RESPIRATORY (INHALATION)
Status: COMPLETED | OUTPATIENT
Start: 2024-05-27 | End: 2024-05-27

## 2024-05-27 RX ORDER — MAGNESIUM SULFATE IN WATER 40 MG/ML
2000 INJECTION, SOLUTION INTRAVENOUS PRN
Status: DISCONTINUED | OUTPATIENT
Start: 2024-05-27 | End: 2024-05-28 | Stop reason: HOSPADM

## 2024-05-27 RX ORDER — ONDANSETRON 2 MG/ML
4 INJECTION INTRAMUSCULAR; INTRAVENOUS EVERY 6 HOURS PRN
Status: DISCONTINUED | OUTPATIENT
Start: 2024-05-27 | End: 2024-05-28 | Stop reason: HOSPADM

## 2024-05-27 RX ORDER — SODIUM CHLORIDE 0.9 % (FLUSH) 0.9 %
5-40 SYRINGE (ML) INJECTION EVERY 12 HOURS SCHEDULED
Status: DISCONTINUED | OUTPATIENT
Start: 2024-05-27 | End: 2024-05-28 | Stop reason: HOSPADM

## 2024-05-27 RX ORDER — ALBUTEROL SULFATE 90 UG/1
2 AEROSOL, METERED RESPIRATORY (INHALATION) EVERY 6 HOURS PRN
Status: DISCONTINUED | OUTPATIENT
Start: 2024-05-27 | End: 2024-05-28 | Stop reason: HOSPADM

## 2024-05-27 RX ORDER — POLYETHYLENE GLYCOL 3350 17 G/17G
17 POWDER, FOR SOLUTION ORAL DAILY PRN
Status: DISCONTINUED | OUTPATIENT
Start: 2024-05-27 | End: 2024-05-28 | Stop reason: HOSPADM

## 2024-05-27 RX ORDER — 0.9 % SODIUM CHLORIDE 0.9 %
1000 INTRAVENOUS SOLUTION INTRAVENOUS ONCE
Status: COMPLETED | OUTPATIENT
Start: 2024-05-27 | End: 2024-05-27

## 2024-05-27 RX ORDER — POTASSIUM CHLORIDE 20 MEQ/1
40 TABLET, EXTENDED RELEASE ORAL PRN
Status: DISCONTINUED | OUTPATIENT
Start: 2024-05-27 | End: 2024-05-28 | Stop reason: HOSPADM

## 2024-05-27 RX ORDER — IPRATROPIUM BROMIDE AND ALBUTEROL SULFATE 2.5; .5 MG/3ML; MG/3ML
1 SOLUTION RESPIRATORY (INHALATION)
Status: DISCONTINUED | OUTPATIENT
Start: 2024-05-27 | End: 2024-05-28 | Stop reason: HOSPADM

## 2024-05-27 RX ORDER — POTASSIUM CHLORIDE 7.45 MG/ML
10 INJECTION INTRAVENOUS PRN
Status: DISCONTINUED | OUTPATIENT
Start: 2024-05-27 | End: 2024-05-28 | Stop reason: HOSPADM

## 2024-05-27 RX ORDER — FLUTICASONE PROPIONATE 50 MCG
1 SPRAY, SUSPENSION (ML) NASAL DAILY
Status: DISCONTINUED | OUTPATIENT
Start: 2024-05-27 | End: 2024-05-28 | Stop reason: HOSPADM

## 2024-05-27 RX ORDER — SODIUM CHLORIDE 0.9 % (FLUSH) 0.9 %
5-40 SYRINGE (ML) INJECTION PRN
Status: DISCONTINUED | OUTPATIENT
Start: 2024-05-27 | End: 2024-05-28 | Stop reason: HOSPADM

## 2024-05-27 RX ORDER — HYDRALAZINE HYDROCHLORIDE 20 MG/ML
10 INJECTION INTRAMUSCULAR; INTRAVENOUS EVERY 6 HOURS PRN
Status: DISCONTINUED | OUTPATIENT
Start: 2024-05-27 | End: 2024-05-28 | Stop reason: HOSPADM

## 2024-05-27 RX ORDER — SODIUM CHLORIDE 9 MG/ML
INJECTION, SOLUTION INTRAVENOUS PRN
Status: DISCONTINUED | OUTPATIENT
Start: 2024-05-27 | End: 2024-05-28 | Stop reason: HOSPADM

## 2024-05-27 RX ORDER — AMLODIPINE BESYLATE 10 MG/1
TABLET ORAL
Qty: 30 TABLET | Refills: 0 | Status: SHIPPED | OUTPATIENT
Start: 2024-05-27 | End: 2024-05-28 | Stop reason: HOSPADM

## 2024-05-27 RX ORDER — ONDANSETRON 4 MG/1
4 TABLET, ORALLY DISINTEGRATING ORAL EVERY 8 HOURS PRN
Status: DISCONTINUED | OUTPATIENT
Start: 2024-05-27 | End: 2024-05-28 | Stop reason: HOSPADM

## 2024-05-27 RX ORDER — GUAIFENESIN 600 MG/1
600 TABLET, EXTENDED RELEASE ORAL 2 TIMES DAILY
Status: DISCONTINUED | OUTPATIENT
Start: 2024-05-27 | End: 2024-05-28 | Stop reason: HOSPADM

## 2024-05-27 RX ORDER — PREDNISONE 20 MG/1
40 TABLET ORAL DAILY
Status: DISCONTINUED | OUTPATIENT
Start: 2024-05-27 | End: 2024-05-28 | Stop reason: HOSPADM

## 2024-05-27 RX ORDER — ACETAMINOPHEN 650 MG/1
650 SUPPOSITORY RECTAL EVERY 6 HOURS PRN
Status: DISCONTINUED | OUTPATIENT
Start: 2024-05-27 | End: 2024-05-28 | Stop reason: HOSPADM

## 2024-05-27 RX ADMIN — CEFTRIAXONE SODIUM 1000 MG: 1 INJECTION, POWDER, FOR SOLUTION INTRAMUSCULAR; INTRAVENOUS at 01:53

## 2024-05-27 RX ADMIN — IPRATROPIUM BROMIDE AND ALBUTEROL SULFATE 1 DOSE: .5; 3 SOLUTION RESPIRATORY (INHALATION) at 12:51

## 2024-05-27 RX ADMIN — AZITHROMYCIN MONOHYDRATE 500 MG: 500 INJECTION, POWDER, LYOPHILIZED, FOR SOLUTION INTRAVENOUS at 01:59

## 2024-05-27 RX ADMIN — GUAIFENESIN 600 MG: 600 TABLET, EXTENDED RELEASE ORAL at 11:35

## 2024-05-27 RX ADMIN — IPRATROPIUM BROMIDE AND ALBUTEROL SULFATE 1 DOSE: .5; 3 SOLUTION RESPIRATORY (INHALATION) at 15:31

## 2024-05-27 RX ADMIN — IPRATROPIUM BROMIDE AND ALBUTEROL SULFATE 1 DOSE: .5; 2.5 SOLUTION RESPIRATORY (INHALATION) at 06:03

## 2024-05-27 RX ADMIN — PREDNISONE 40 MG: 20 TABLET ORAL at 08:15

## 2024-05-27 RX ADMIN — FLUTICASONE PROPIONATE 1 SPRAY: 50 SPRAY, METERED NASAL at 11:35

## 2024-05-27 RX ADMIN — IPRATROPIUM BROMIDE AND ALBUTEROL SULFATE 1 DOSE: .5; 2.5 SOLUTION RESPIRATORY (INHALATION) at 06:05

## 2024-05-27 RX ADMIN — GUAIFENESIN 600 MG: 600 TABLET, EXTENDED RELEASE ORAL at 20:50

## 2024-05-27 RX ADMIN — IPRATROPIUM BROMIDE AND ALBUTEROL SULFATE 1 DOSE: .5; 3 SOLUTION RESPIRATORY (INHALATION) at 20:26

## 2024-05-27 RX ADMIN — SODIUM CHLORIDE 1000 ML: 9 INJECTION, SOLUTION INTRAVENOUS at 05:48

## 2024-05-27 RX ADMIN — IPRATROPIUM BROMIDE AND ALBUTEROL SULFATE 1 DOSE: .5; 2.5 SOLUTION RESPIRATORY (INHALATION) at 06:02

## 2024-05-27 RX ADMIN — SODIUM CHLORIDE, PRESERVATIVE FREE 10 ML: 5 INJECTION INTRAVENOUS at 20:50

## 2024-05-27 RX ADMIN — SODIUM CHLORIDE 1000 ML: 9 INJECTION, SOLUTION INTRAVENOUS at 01:53

## 2024-05-27 RX ADMIN — SODIUM CHLORIDE, PRESERVATIVE FREE 10 ML: 5 INJECTION INTRAVENOUS at 08:16

## 2024-05-27 ASSESSMENT — PAIN SCALES - GENERAL
PAINLEVEL_OUTOF10: 0

## 2024-05-27 ASSESSMENT — ENCOUNTER SYMPTOMS
COUGH: 1
NAUSEA: 0
SHORTNESS OF BREATH: 1
BACK PAIN: 0
DIARRHEA: 0
ABDOMINAL PAIN: 0
VOMITING: 0

## 2024-05-27 ASSESSMENT — PAIN - FUNCTIONAL ASSESSMENT: PAIN_FUNCTIONAL_ASSESSMENT: NONE - DENIES PAIN

## 2024-05-27 NOTE — PROGRESS NOTES
Received patient from ED,oriented x 4,settled in bed,vital signs charted,skin assessment done,patient updated on plan of care, and oriented in the room,all questions answered,safety measures in place,call bell within patient reach

## 2024-05-27 NOTE — ED TRIAGE NOTES
Pt reports 3 days of SOB, + cough as well, was getting better, worsened tonight, used breathing tx PTA, ambulatory, stat EKG on arrival

## 2024-05-27 NOTE — H&P
albuterol sulfate HFA (PROVENTIL;VENTOLIN;PROAIR) 108 (90 Base) MCG/ACT inhaler INHALE 1 PUFF EVERY 6 HOURS AS NEEDED 12/12/23   Lanette French MD   budesonide (PULMICORT) 0.5 MG/2ML nebulizer suspension budesonide 0.5 mg/2 mL suspension for nebulization 12/12/23   Lanette French MD   ipratropium 0.5 mg-albuterol 2.5 mg (DUONEB) 0.5-2.5 (3) MG/3ML SOLN nebulizer solution USE 1 VIAL IN NEBULIZER 4 TIMES A DAY 12/12/23   Lanette French MD   fluticasone (FLONASE) 50 MCG/ACT nasal spray fluticasone propionate 50 mcg/actuation nasal spray,suspension 11/22/23   Houston Stevens MD   vitamin D (CHOLECALCIFEROL) 25 MCG (1000 UT) TABS tablet Take 1 tablet by mouth daily 6/1/23   Houston Stevens MD   diclofenac sodium (VOLTAREN) 1 % GEL Apply topically 4 times daily 6/9/22   Automatic Reconciliation, Ar   dupilumab (DUPIXENT) 300 MG/2ML SOSY injection Inject 2 mLs into the skin every 14 days  Patient not taking: Reported on 6/1/2023 12/6/22   Automatic Reconciliation, Ar   finasteride (PROSCAR) 5 MG tablet Take 1 tablet by mouth daily 5/11/22   Automatic Reconciliation, Ar       No Known Allergies     Family History   Problem Relation Age of Onset    High Blood Pressure Father     Hypertension Father         Social History     Socioeconomic History    Marital status:      Spouse name: None    Number of children: None    Years of education: None    Highest education level: None   Tobacco Use    Smoking status: Never    Smokeless tobacco: Never   Vaping Use    Vaping Use: Never used   Substance and Sexual Activity    Alcohol use: Never    Drug use: Never    Sexual activity: Yes     Partners: Female     Social Determinants of Health     Financial Resource Strain: Low Risk  (6/1/2023)    Overall Financial Resource Strain (CARDIA)     Difficulty of Paying Living Expenses: Not hard at all   Transportation Needs: Unknown (6/1/2023)    PRAPARE - Transportation     Lack of Transportation (Non-Medical): No

## 2024-05-27 NOTE — PROGRESS NOTES
Hospitalist Progress Note    NAME:   Prahbakar Moran   : 1955   MRN: [id number]     Date/Time: 2024 10:58 AM  Patient PCP: Houston Stevens MD    Estimated discharge date: 24 to 48 hours  Barriers: Improvement in tachycardia, IV antibiotics    Hospital course:  This is a 68-year-old male with past medical history of asthma/COPD, hypertension who presented to the emergency department for 3 days of gradually worsening shortness of breath.  Patient reported no improvement with using home DuoNebs and rescue inhaler.  No hypoxia or fever.  Leukocytosis of 15,000 concerning for possible pneumonia.  Serial chest x-rays negative for acute infiltrate.  COVID and influenza negative.  Blood cultures obtained in the ED.  Patient admitted for treatment and further workup of possible COPD exacerbation versus bronchitis.  Patient started on IV azithromycin and Rocephin, scheduled DuoNebs, prednisone.      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  Was previously followed by pulmonologist on outpatient basis however no formal diagnosis of COPD and is not on any maintenance inhalers.  COVID and influenza negative  Procal negative  Blood cultures obtained, no growth to date  Continue IV Rocephin and azithromycin  Continue high-dose prednisone  Continue scheduled DuoNebs  Mucinex  Albuterol as needed    Tachycardia  Likely related to dyspnea  PERC + will obtain Ddimer    History of multiple lung nodules  CT of the chest  with several small lung nodules measuring 4 mm in appearance, recommended outpatient follow-up    Hypertension  Patient reports he was compliant with amlodipine 10 mg daily however is concerned about \"side effects \"  Blood pressure currently controlled, will hold at this time  Hydralazine as needed        Medical Decision Making

## 2024-05-27 NOTE — ED NOTES
ED TO INPATIENT SBAR HANDOFF    Patient Name: Prabhakar Moran   Preferred Name: Prabhakar  : 1955  68 y.o.   Family/Caregiver Present: no   Code Status Order: Full Code  PO Status: NPO:No  Telemetry Order: No  C-SSRS: Risk of Suicide: No Risk  Sitter no   Restraints:     Sepsis Risk Score Sepsis Risk Score: 10.99    Situation  Chief Complaint   Patient presents with    Shortness of Breath     Brief Description of Patient's Condition: Patient arrived to the ED with SOB and a cough x3days   Mental Status: oriented, alert, coherent, logical, thought processes intact, and able to concentrate and follow conversation  Arrived from:Home  Imaging:   XR CHEST 1 VIEW   Final Result   No acute process.      XR CHEST (2 VW)    (Results Pending)     Abnormal labs:   Abnormal Labs Reviewed   CBC WITH AUTO DIFFERENTIAL - Abnormal; Notable for the following components:       Result Value    WBC 15.0 (*)     Neutrophils % 78 (*)     Neutrophils Absolute 11.8 (*)     Immature Granulocytes Absolute 0.1 (*)     All other components within normal limits   COMPREHENSIVE METABOLIC PANEL - Abnormal; Notable for the following components:    Sodium 135 (*)     Glucose 112 (*)     Alk Phosphatase 125 (*)     Globulin 4.1 (*)     Albumin/Globulin Ratio 0.9 (*)     All other components within normal limits   PROCALCITONIN - Abnormal; Notable for the following components:    Procalcitonin <0.05 (*)     All other components within normal limits   URINALYSIS WITH REFLEX TO CULTURE - Abnormal; Notable for the following components:    Ketones, Urine 5 (*)     Blood, Urine Small (*)     All other components within normal limits       Background  Allergies: No Known Allergies  History:   Past Medical History:   Diagnosis Date    Asthma     Benign essential hypertension     Bronchiectasis without complication (HCC)     Elevated fasting blood sugar     Eosinophilia, unspecified type     GERD (gastroesophageal reflux disease)     Hypercholesterolemia

## 2024-05-27 NOTE — PLAN OF CARE
Problem: ABCDS Injury Assessment  Goal: Absence of physical injury  Outcome: Progressing     Problem: Discharge Planning  Goal: Discharge to home or other facility with appropriate resources  Outcome: Progressing  Flowsheets (Taken 5/27/2024 1161)  Discharge to home or other facility with appropriate resources: Identify barriers to discharge with patient and caregiver

## 2024-05-28 VITALS
OXYGEN SATURATION: 96 % | WEIGHT: 150 LBS | SYSTOLIC BLOOD PRESSURE: 123 MMHG | RESPIRATION RATE: 16 BRPM | HEIGHT: 70 IN | HEART RATE: 85 BPM | BODY MASS INDEX: 21.47 KG/M2 | DIASTOLIC BLOOD PRESSURE: 77 MMHG | TEMPERATURE: 98.1 F

## 2024-05-28 LAB
ALBUMIN SERPL-MCNC: 3.2 G/DL (ref 3.5–5)
ALBUMIN/GLOB SERPL: 0.8 (ref 1.1–2.2)
ALP SERPL-CCNC: 110 U/L (ref 45–117)
ALT SERPL-CCNC: 16 U/L (ref 12–78)
ANION GAP SERPL CALC-SCNC: 7 MMOL/L (ref 5–15)
AST SERPL W P-5'-P-CCNC: 9 U/L (ref 15–37)
BASOPHILS # BLD: 0 K/UL (ref 0–0.1)
BASOPHILS NFR BLD: 0 % (ref 0–1)
BILIRUB SERPL-MCNC: 0.4 MG/DL (ref 0.2–1)
BUN SERPL-MCNC: 12 MG/DL (ref 6–20)
BUN/CREAT SERPL: 16 (ref 12–20)
CA-I BLD-MCNC: 8.7 MG/DL (ref 8.5–10.1)
CHLORIDE SERPL-SCNC: 108 MMOL/L (ref 97–108)
CO2 SERPL-SCNC: 28 MMOL/L (ref 21–32)
CREAT SERPL-MCNC: 0.77 MG/DL (ref 0.7–1.3)
DIFFERENTIAL METHOD BLD: ABNORMAL
EOSINOPHIL # BLD: 0.2 K/UL (ref 0–0.4)
EOSINOPHIL NFR BLD: 2 % (ref 0–7)
ERYTHROCYTE [DISTWIDTH] IN BLOOD BY AUTOMATED COUNT: 12.2 % (ref 11.5–14.5)
GLOBULIN SER CALC-MCNC: 3.9 G/DL (ref 2–4)
GLUCOSE SERPL-MCNC: 105 MG/DL (ref 65–100)
HCT VFR BLD AUTO: 38.6 % (ref 36.6–50.3)
HGB BLD-MCNC: 12.9 G/DL (ref 12.1–17)
IMM GRANULOCYTES # BLD AUTO: 0.1 K/UL (ref 0–0.04)
IMM GRANULOCYTES NFR BLD AUTO: 0 % (ref 0–0.5)
LYMPHOCYTES # BLD: 1.5 K/UL (ref 0.8–3.5)
LYMPHOCYTES NFR BLD: 13 % (ref 12–49)
MAGNESIUM SERPL-MCNC: 1.9 MG/DL (ref 1.6–2.4)
MCH RBC QN AUTO: 28.8 PG (ref 26–34)
MCHC RBC AUTO-ENTMCNC: 33.4 G/DL (ref 30–36.5)
MCV RBC AUTO: 86.2 FL (ref 80–99)
MONOCYTES # BLD: 1 K/UL (ref 0–1)
MONOCYTES NFR BLD: 8 % (ref 5–13)
NEUTS SEG # BLD: 9.5 K/UL (ref 1.8–8)
NEUTS SEG NFR BLD: 77 % (ref 32–75)
NRBC # BLD: 0 K/UL (ref 0–0.01)
NRBC BLD-RTO: 0 PER 100 WBC
PHOSPHATE SERPL-MCNC: 2.5 MG/DL (ref 2.6–4.7)
PLATELET # BLD AUTO: 141 K/UL (ref 150–400)
PMV BLD AUTO: 9.7 FL (ref 8.9–12.9)
POTASSIUM SERPL-SCNC: 3.4 MMOL/L (ref 3.5–5.1)
PROCALCITONIN SERPL-MCNC: 0.06 NG/ML
PROT SERPL-MCNC: 7.1 G/DL (ref 6.4–8.2)
RBC # BLD AUTO: 4.48 M/UL (ref 4.1–5.7)
SODIUM SERPL-SCNC: 143 MMOL/L (ref 136–145)
WBC # BLD AUTO: 12.3 K/UL (ref 4.1–11.1)

## 2024-05-28 PROCEDURE — 85025 COMPLETE CBC W/AUTO DIFF WBC: CPT

## 2024-05-28 PROCEDURE — 6360000002 HC RX W HCPCS: Performed by: INTERNAL MEDICINE

## 2024-05-28 PROCEDURE — 80053 COMPREHEN METABOLIC PANEL: CPT

## 2024-05-28 PROCEDURE — 84100 ASSAY OF PHOSPHORUS: CPT

## 2024-05-28 PROCEDURE — 94640 AIRWAY INHALATION TREATMENT: CPT

## 2024-05-28 PROCEDURE — 36415 COLL VENOUS BLD VENIPUNCTURE: CPT

## 2024-05-28 PROCEDURE — 96376 TX/PRO/DX INJ SAME DRUG ADON: CPT

## 2024-05-28 PROCEDURE — 96366 THER/PROPH/DIAG IV INF ADDON: CPT

## 2024-05-28 PROCEDURE — G0378 HOSPITAL OBSERVATION PER HR: HCPCS

## 2024-05-28 PROCEDURE — 6370000000 HC RX 637 (ALT 250 FOR IP): Performed by: STUDENT IN AN ORGANIZED HEALTH CARE EDUCATION/TRAINING PROGRAM

## 2024-05-28 PROCEDURE — 83735 ASSAY OF MAGNESIUM: CPT

## 2024-05-28 PROCEDURE — 94761 N-INVAS EAR/PLS OXIMETRY MLT: CPT

## 2024-05-28 PROCEDURE — 2580000003 HC RX 258: Performed by: INTERNAL MEDICINE

## 2024-05-28 PROCEDURE — 6370000000 HC RX 637 (ALT 250 FOR IP): Performed by: PHYSICIAN ASSISTANT

## 2024-05-28 PROCEDURE — 6370000000 HC RX 637 (ALT 250 FOR IP): Performed by: INTERNAL MEDICINE

## 2024-05-28 PROCEDURE — 84145 PROCALCITONIN (PCT): CPT

## 2024-05-28 RX ORDER — PREDNISONE 20 MG/1
40 TABLET ORAL DAILY
Qty: 6 TABLET | Refills: 0 | Status: SHIPPED | OUTPATIENT
Start: 2024-05-29 | End: 2024-06-01

## 2024-05-28 RX ORDER — GUAIFENESIN 600 MG/1
600 TABLET, EXTENDED RELEASE ORAL 2 TIMES DAILY
Qty: 10 TABLET | Refills: 0 | Status: SHIPPED | OUTPATIENT
Start: 2024-05-28 | End: 2024-06-02

## 2024-05-28 RX ORDER — POTASSIUM CHLORIDE 20 MEQ/1
20 TABLET, EXTENDED RELEASE ORAL ONCE
Status: COMPLETED | OUTPATIENT
Start: 2024-05-28 | End: 2024-05-28

## 2024-05-28 RX ORDER — AMOXICILLIN AND CLAVULANATE POTASSIUM 875; 125 MG/1; MG/1
1 TABLET, FILM COATED ORAL 2 TIMES DAILY
Qty: 10 TABLET | Refills: 0 | Status: SHIPPED | OUTPATIENT
Start: 2024-05-28 | End: 2024-06-02

## 2024-05-28 RX ORDER — BUDESONIDE AND FORMOTEROL FUMARATE DIHYDRATE 160; 4.5 UG/1; UG/1
2 AEROSOL RESPIRATORY (INHALATION)
Status: DISCONTINUED | OUTPATIENT
Start: 2024-05-28 | End: 2024-05-28 | Stop reason: HOSPADM

## 2024-05-28 RX ADMIN — AZITHROMYCIN MONOHYDRATE 500 MG: 500 INJECTION, POWDER, LYOPHILIZED, FOR SOLUTION INTRAVENOUS at 00:43

## 2024-05-28 RX ADMIN — IPRATROPIUM BROMIDE AND ALBUTEROL SULFATE 1 DOSE: .5; 3 SOLUTION RESPIRATORY (INHALATION) at 11:05

## 2024-05-28 RX ADMIN — IPRATROPIUM BROMIDE AND ALBUTEROL SULFATE 1 DOSE: .5; 3 SOLUTION RESPIRATORY (INHALATION) at 07:16

## 2024-05-28 RX ADMIN — POLYETHYLENE GLYCOL 3350 17 G: 17 POWDER, FOR SOLUTION ORAL at 12:31

## 2024-05-28 RX ADMIN — CEFTRIAXONE SODIUM 1000 MG: 1 INJECTION, POWDER, FOR SOLUTION INTRAMUSCULAR; INTRAVENOUS at 00:30

## 2024-05-28 RX ADMIN — PREDNISONE 40 MG: 20 TABLET ORAL at 08:56

## 2024-05-28 RX ADMIN — DIBASIC SODIUM PHOSPHATE, MONOBASIC POTASSIUM PHOSPHATE AND MONOBASIC SODIUM PHOSPHATE 2 TABLET: 852; 155; 130 TABLET ORAL at 10:53

## 2024-05-28 RX ADMIN — FLUTICASONE PROPIONATE 1 SPRAY: 50 SPRAY, METERED NASAL at 08:58

## 2024-05-28 RX ADMIN — SODIUM CHLORIDE, PRESERVATIVE FREE 10 ML: 5 INJECTION INTRAVENOUS at 08:58

## 2024-05-28 RX ADMIN — POTASSIUM CHLORIDE 20 MEQ: 1500 TABLET, EXTENDED RELEASE ORAL at 10:54

## 2024-05-28 RX ADMIN — GUAIFENESIN 600 MG: 600 TABLET, EXTENDED RELEASE ORAL at 08:57

## 2024-05-28 RX ADMIN — BUDESONIDE AND FORMOTEROL FUMARATE DIHYDRATE 2 PUFF: 160; 4.5 AEROSOL RESPIRATORY (INHALATION) at 11:05

## 2024-05-28 NOTE — CARE COORDINATION
CM noted discharge order. No CM needs. Nurse aware.     Transition of Care Plan:    RUR: Observation  Prior Level of Functioning: Independent  Disposition: Home  If SNF or IPR: Date FOC offered: n/a  Date FOC received: n/a  Accepting facility: n/a  Date authorization started with reference number: n/a  Date authorization received and expires: n/a  Follow up appointments: Per MD  DME needed: n/a  Transportation at discharge: Family/ friends  IM/IMM Medicare/ letter given: n/a  Is patient a  and connected with VA? N/a   If yes, was [place] transfer form completed and [facility] notified?   Caregiver Contact: n/a  Discharge Caregiver contacted prior to discharge? N/a  Care Conference needed? no  Barriers to discharge: none

## 2024-05-28 NOTE — PROGRESS NOTES
..Discharge plan of care/case management plan validated with provider discharge order.     Pt. Being discharged home  with self care. Discharge instructions were given to pt and spouse. Both verbalized  an understanding of the discharge instructions. Spouse will transport pt home upon discharge.

## 2024-05-28 NOTE — DISCHARGE SUMMARY
daily for 5 days     guaiFENesin 600 MG extended release tablet  Commonly known as: MUCINEX  Take 1 tablet by mouth 2 times daily for 5 days     predniSONE 20 MG tablet  Commonly known as: DELTASONE  Take 2 tablets by mouth daily for 3 doses  Start taking on: May 29, 2024            CONTINUE taking these medications      albuterol sulfate  (90 Base) MCG/ACT inhaler  Commonly known as: PROVENTIL;VENTOLIN;PROAIR  INHALE 1 PUFF EVERY 6 HOURS AS NEEDED     * budesonide 0.5 MG/2ML nebulizer suspension  Commonly known as: PULMICORT  budesonide 0.5 mg/2 mL suspension for nebulization     * budesonide 180 MCG/ACT Aepb inhaler  Commonly known as: Pulmicort Flexhaler  Inhale 2 puffs into the lungs in the morning and 2 puffs in the evening.     diclofenac sodium 1 % Gel  Commonly known as: VOLTAREN     finasteride 5 MG tablet  Commonly known as: PROSCAR     fluticasone 50 MCG/ACT nasal spray  Commonly known as: FLONASE  fluticasone propionate 50 mcg/actuation nasal spray,suspension     ipratropium 0.5 mg-albuterol 2.5 mg 0.5-2.5 (3) MG/3ML Soln nebulizer solution  Commonly known as: DUONEB  USE 1 VIAL IN NEBULIZER 4 TIMES A DAY     vitamin D 25 MCG (1000 UT) Tabs tablet  Commonly known as: CHOLECALCIFEROL  Take 1 tablet by mouth daily           * This list has 2 medication(s) that are the same as other medications prescribed for you. Read the directions carefully, and ask your doctor or other care provider to review them with you.                STOP taking these medications      amLODIPine 10 MG tablet  Commonly known as: NORVASC     Dupixent 300 MG/2ML Sosy injection  Generic drug: dupilumab               Where to Get Your Medications        These medications were sent to [facility]/pharmacy [address] [place] [address] - [address] [facility] - P [phone] - F [phone]  [address] [facility] [address]      Phone: [phone]   amoxicillin-clavulanate 875-125 MG per tablet  guaiFENesin 600 MG extended release

## 2024-06-02 LAB
BACTERIA SPEC CULT: NORMAL
BACTERIA SPEC CULT: NORMAL
Lab: NORMAL
Lab: NORMAL

## 2024-06-03 RX ORDER — MELATONIN
1 DAILY
Qty: 90 TABLET | Refills: 0 | Status: SHIPPED | OUTPATIENT
Start: 2024-06-03

## 2024-06-14 ENCOUNTER — OFFICE VISIT (OUTPATIENT)
Facility: CLINIC | Age: 69
End: 2024-06-14

## 2024-06-14 VITALS
DIASTOLIC BLOOD PRESSURE: 77 MMHG | BODY MASS INDEX: 28.2 KG/M2 | HEART RATE: 92 BPM | OXYGEN SATURATION: 97 % | TEMPERATURE: 97.9 F | HEIGHT: 70 IN | WEIGHT: 197 LBS | SYSTOLIC BLOOD PRESSURE: 134 MMHG

## 2024-06-14 DIAGNOSIS — M25.471 ANKLE EDEMA, BILATERAL: ICD-10-CM

## 2024-06-14 DIAGNOSIS — Z12.5 SCREENING PSA (PROSTATE SPECIFIC ANTIGEN): ICD-10-CM

## 2024-06-14 DIAGNOSIS — R93.89 ABNORMAL CHEST CT: ICD-10-CM

## 2024-06-14 DIAGNOSIS — E87.6 HYPOKALEMIA: ICD-10-CM

## 2024-06-14 DIAGNOSIS — E88.09 HYPOALBUMINEMIA: ICD-10-CM

## 2024-06-14 DIAGNOSIS — R73.09 ELEVATED GLUCOSE: ICD-10-CM

## 2024-06-14 DIAGNOSIS — E78.00 HYPERCHOLESTEROLEMIA: ICD-10-CM

## 2024-06-14 DIAGNOSIS — J47.9 BRONCHIECTASIS WITHOUT COMPLICATION (HCC): ICD-10-CM

## 2024-06-14 DIAGNOSIS — R35.0 INCREASED FREQUENCY OF MICTURITION: ICD-10-CM

## 2024-06-14 DIAGNOSIS — J44.89 ASTHMATIC BRONCHITIS , CHRONIC (HCC): Primary | ICD-10-CM

## 2024-06-14 DIAGNOSIS — M25.472 ANKLE EDEMA, BILATERAL: ICD-10-CM

## 2024-06-14 DIAGNOSIS — I10 ESSENTIAL HYPERTENSION, BENIGN: ICD-10-CM

## 2024-06-14 PROBLEM — D69.6 THROMBOCYTOPENIA (HCC): Status: RESOLVED | Noted: 2023-01-14 | Resolved: 2024-06-14

## 2024-06-14 SDOH — ECONOMIC STABILITY: FOOD INSECURITY: WITHIN THE PAST 12 MONTHS, YOU WORRIED THAT YOUR FOOD WOULD RUN OUT BEFORE YOU GOT MONEY TO BUY MORE.: NEVER TRUE

## 2024-06-14 SDOH — ECONOMIC STABILITY: FOOD INSECURITY: WITHIN THE PAST 12 MONTHS, THE FOOD YOU BOUGHT JUST DIDN'T LAST AND YOU DIDN'T HAVE MONEY TO GET MORE.: NEVER TRUE

## 2024-06-14 SDOH — ECONOMIC STABILITY: INCOME INSECURITY: HOW HARD IS IT FOR YOU TO PAY FOR THE VERY BASICS LIKE FOOD, HOUSING, MEDICAL CARE, AND HEATING?: NOT HARD AT ALL

## 2024-06-14 ASSESSMENT — PATIENT HEALTH QUESTIONNAIRE - PHQ9
SUM OF ALL RESPONSES TO PHQ QUESTIONS 1-9: 0
SUM OF ALL RESPONSES TO PHQ9 QUESTIONS 1 & 2: 0
1. LITTLE INTEREST OR PLEASURE IN DOING THINGS: NOT AT ALL
2. FEELING DOWN, DEPRESSED OR HOPELESS: NOT AT ALL
SUM OF ALL RESPONSES TO PHQ QUESTIONS 1-9: 0

## 2024-06-14 ASSESSMENT — ENCOUNTER SYMPTOMS
COUGH: 0
DIARRHEA: 0
SHORTNESS OF BREATH: 0
NAUSEA: 0
VOMITING: 0
ABDOMINAL PAIN: 0
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
## 2024-07-03 RX ORDER — AMLODIPINE BESYLATE 10 MG/1
10 TABLET ORAL
Qty: 30 TABLET | Refills: 2 | Status: SHIPPED | OUTPATIENT
Start: 2024-07-03

## 2024-07-03 RX ORDER — AMLODIPINE BESYLATE 10 MG/1
10 TABLET ORAL
COMMUNITY
End: 2024-07-03 | Stop reason: SDUPTHER

## 2024-07-30 ENCOUNTER — HOSPITAL ENCOUNTER (EMERGENCY)
Facility: HOSPITAL | Age: 69
Discharge: HOME | End: 2024-07-30
Attending: EMERGENCY MEDICINE
Payer: MEDICARE

## 2024-07-30 VITALS
TEMPERATURE: 98.1 F | SYSTOLIC BLOOD PRESSURE: 118 MMHG | WEIGHT: 185 LBS | OXYGEN SATURATION: 98 % | RESPIRATION RATE: 18 BRPM | HEART RATE: 82 BPM | BODY MASS INDEX: 25.9 KG/M2 | HEIGHT: 71 IN | DIASTOLIC BLOOD PRESSURE: 69 MMHG
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
## 2024-07-30 PROCEDURE — 6370000000 HC RX 637 (ALT 250 FOR IP): Performed by: EMERGENCY MEDICINE

## 2024-07-30 PROCEDURE — 93005 ELECTROCARDIOGRAM TRACING: CPT | Performed by: STUDENT IN AN ORGANIZED HEALTH CARE EDUCATION/TRAINING PROGRAM

## 2024-07-30 PROCEDURE — 99283 EMERGENCY DEPT VISIT LOW MDM: CPT

## 2024-07-30 RX ORDER — ACETAMINOPHEN 500 MG
1000 TABLET ORAL
Status: COMPLETED | OUTPATIENT
Start: 2024-07-30 | End: 2024-07-30

## 2024-07-30 RX ORDER — AMLODIPINE BESYLATE 5 MG/1
10 TABLET ORAL DAILY
Status: DISCONTINUED | OUTPATIENT
Start: 2024-07-31 | End: 2024-07-30

## 2024-07-30 RX ORDER — AMLODIPINE BESYLATE 5 MG/1
10 TABLET ORAL ONCE
Status: COMPLETED | OUTPATIENT
Start: 2024-07-30 | End: 2024-07-30

## 2024-07-30 RX ADMIN — AMLODIPINE BESYLATE 10 MG: 5 TABLET ORAL at 22:19

## 2024-07-30 RX ADMIN — ACETAMINOPHEN 1000 MG: 500 TABLET ORAL at 22:19

## 2024-07-30 ASSESSMENT — PAIN DESCRIPTION - LOCATION: LOCATION: HEAD

## 2024-07-30 ASSESSMENT — PAIN SCALES - GENERAL: PAINLEVEL_OUTOF10: 3

## 2024-07-31 LAB
EKG ATRIAL RATE: 101 BPM
EKG DIAGNOSIS: NORMAL
EKG P AXIS: 65 DEGREES
EKG P-R INTERVAL: 174 MS
EKG Q-T INTERVAL: 348 MS
EKG QRS DURATION: 80 MS
EKG QTC CALCULATION (BAZETT): 451 MS
EKG R AXIS: 9 DEGREES
EKG T AXIS: 48 DEGREES
EKG VENTRICULAR RATE: 101 BPM
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
## 2024-08-07 ENCOUNTER — OFFICE VISIT (OUTPATIENT)
Facility: CLINIC | Age: 69
End: 2024-08-07
Payer: MEDICARE

## 2024-08-07 VITALS
HEART RATE: 67 BPM | HEIGHT: 71 IN | DIASTOLIC BLOOD PRESSURE: 82 MMHG | SYSTOLIC BLOOD PRESSURE: 131 MMHG | OXYGEN SATURATION: 99 % | BODY MASS INDEX: 26.04 KG/M2 | TEMPERATURE: 97.9 F | WEIGHT: 186 LBS

## 2024-08-07 DIAGNOSIS — J45.909 MODERATE ASTHMA, UNSPECIFIED WHETHER COMPLICATED, UNSPECIFIED WHETHER PERSISTENT: ICD-10-CM
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
## 2024-08-07 DIAGNOSIS — E55.9 VITAMIN D DEFICIENCY: ICD-10-CM

## 2024-08-07 DIAGNOSIS — M54.2 NECK PAIN: ICD-10-CM

## 2024-08-07 DIAGNOSIS — I10 ESSENTIAL HYPERTENSION, BENIGN: ICD-10-CM

## 2024-08-07 PROCEDURE — G8419 CALC BMI OUT NRM PARAM NOF/U: HCPCS | Performed by: INTERNAL MEDICINE

## 2024-08-07 PROCEDURE — 1036F TOBACCO NON-USER: CPT | Performed by: INTERNAL MEDICINE

## 2024-08-07 PROCEDURE — 3079F DIAST BP 80-89 MM HG: CPT | Performed by: INTERNAL MEDICINE

## 2024-08-07 PROCEDURE — 1123F ACP DISCUSS/DSCN MKR DOCD: CPT | Performed by: INTERNAL MEDICINE

## 2024-08-07 PROCEDURE — 3075F SYST BP GE 130 - 139MM HG: CPT | Performed by: INTERNAL MEDICINE

## 2024-08-07 PROCEDURE — 3017F COLORECTAL CA SCREEN DOC REV: CPT | Performed by: INTERNAL MEDICINE

## 2024-08-07 PROCEDURE — 99214 OFFICE O/P EST MOD 30 MIN: CPT | Performed by: INTERNAL MEDICINE

## 2024-08-07 PROCEDURE — G8427 DOCREV CUR MEDS BY ELIG CLIN: HCPCS | Performed by: INTERNAL MEDICINE

## 2024-08-07 RX ORDER — IPRATROPIUM BROMIDE AND ALBUTEROL SULFATE 2.5; .5 MG/3ML; MG/3ML
SOLUTION RESPIRATORY (INHALATION)
Qty: 360 ML | Refills: 1 | Status: SHIPPED | OUTPATIENT
Start: 2024-08-07

## 2024-08-07 RX ORDER — AMLODIPINE BESYLATE 10 MG/1
10 TABLET ORAL
Qty: 90 TABLET | Refills: 1 | Status: SHIPPED | OUTPATIENT
Start: 2024-08-07

## 2024-08-07 RX ORDER — BUDESONIDE 0.5 MG/2ML
INHALANT ORAL
Qty: 60 EACH | Refills: 1 | Status: CANCELLED | OUTPATIENT
Start: 2024-08-07

## 2024-08-07 RX ORDER — BACLOFEN 5 MG/1
5 TABLET ORAL NIGHTLY PRN
Qty: 30 TABLET | Refills: 0 | Status: SHIPPED | OUTPATIENT
Start: 2024-08-07

## 2024-08-07 RX ORDER — ALBUTEROL SULFATE 90 UG/1
AEROSOL, METERED RESPIRATORY (INHALATION)
Qty: 18 G | Refills: 1 | Status: SHIPPED | OUTPATIENT
Start: 2024-08-07

## 2024-08-07 RX ORDER — FLUTICASONE PROPIONATE 50 MCG
SPRAY, SUSPENSION (ML) NASAL
Qty: 48 G | Refills: 0 | Status: SHIPPED | OUTPATIENT
Start: 2024-08-07

## 2024-08-07 RX ORDER — CHOLECALCIFEROL (VITAMIN D3) 25 MCG
1 TABLET ORAL DAILY
Qty: 90 TABLET | Refills: 0 | Status: SHIPPED | OUTPATIENT
Start: 2024-08-07

## 2024-08-08 ASSESSMENT — ENCOUNTER SYMPTOMS
ABDOMINAL PAIN: 0
COUGH: 0
SHORTNESS OF BREATH: 0
VOMITING: 0
NAUSEA: 0
DIARRHEA: 0
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
## 2024-08-09 DIAGNOSIS — J45.909 MODERATE ASTHMA, UNSPECIFIED WHETHER COMPLICATED, UNSPECIFIED WHETHER PERSISTENT: ICD-10-CM

## 2024-08-09 DIAGNOSIS — I10 ESSENTIAL HYPERTENSION, BENIGN: ICD-10-CM

## 2024-08-09 DIAGNOSIS — E55.9 VITAMIN D DEFICIENCY: ICD-10-CM

## 2024-08-22 LAB
ALBUMIN SERPL-MCNC: 4.4 G/DL (ref 3.8–4.8)
ALP SERPL-CCNC: 129 IU/L (ref 44–121)
ALT SERPL-CCNC: 19 IU/L (ref 0–44)
APPEARANCE UR: CLEAR
AST SERPL-CCNC: 23 IU/L (ref 0–40)
BILIRUB SERPL-MCNC: 0.6 MG/DL (ref 0–1.2)
BILIRUB UR QL STRIP: NEGATIVE
BUN SERPL-MCNC: 11 MG/DL (ref 8–27)
BUN/CREAT SERPL: 14 (ref 10–24)
CALCIUM SERPL-MCNC: 9.5 MG/DL (ref 8.6–10.2)
CHLORIDE SERPL-SCNC: 102 MMOL/L (ref 96–106)
CHOLEST SERPL-MCNC: 166 MG/DL (ref 100–199)
CO2 SERPL-SCNC: 23 MMOL/L (ref 20–29)
COLOR UR: YELLOW
CREAT SERPL-MCNC: 0.78 MG/DL (ref 0.76–1.27)
EGFRCR SERPLBLD CKD-EPI 2021: 95 ML/MIN/1.73
GLOBULIN SER CALC-MCNC: 2.7 G/DL (ref 1.5–4.5)
GLUCOSE SERPL-MCNC: 87 MG/DL (ref 70–99)
GLUCOSE UR QL STRIP: NEGATIVE
HBA1C MFR BLD: 5.7 % (ref 4.8–5.6)
HDLC SERPL-MCNC: 57 MG/DL
HGB UR QL STRIP: NEGATIVE
KETONES UR QL STRIP: NEGATIVE
LDLC SERPL CALC-MCNC: 98 MG/DL (ref 0–99)
LEUKOCYTE ESTERASE UR QL STRIP: ABNORMAL
NITRITE UR QL STRIP: NEGATIVE
PH UR STRIP: 6.5 [PH] (ref 5–7.5)
POTASSIUM SERPL-SCNC: 4.2 MMOL/L (ref 3.5–5.2)
PROT SERPL-MCNC: 7.1 G/DL (ref 6–8.5)
PROT UR QL STRIP: NEGATIVE
PSA SERPL-MCNC: 4.3 NG/ML (ref 0–4)
SODIUM SERPL-SCNC: 140 MMOL/L (ref 134–144)
SP GR UR STRIP: 1.01 (ref 1–1.03)
TRIGL SERPL-MCNC: 55 MG/DL (ref 0–149)
TSH SERPL DL<=0.005 MIU/L-ACNC: 0.52 UIU/ML (ref 0.45–4.5)
UROBILINOGEN UR STRIP-MCNC: 0.2 MG/DL (ref 0.2–1)
VLDLC SERPL CALC-MCNC: 11 MG/DL (ref 5–40)

## 2024-08-23 ENCOUNTER — OFFICE VISIT (OUTPATIENT)
Facility: CLINIC | Age: 69
End: 2024-08-23

## 2024-08-23 VITALS
WEIGHT: 183 LBS | DIASTOLIC BLOOD PRESSURE: 71 MMHG | OXYGEN SATURATION: 97 % | HEIGHT: 71 IN | TEMPERATURE: 98.2 F | BODY MASS INDEX: 25.62 KG/M2 | HEART RATE: 81 BPM | SYSTOLIC BLOOD PRESSURE: 134 MMHG

## 2024-08-23 DIAGNOSIS — I10 ESSENTIAL HYPERTENSION, BENIGN: ICD-10-CM

## 2024-08-23 DIAGNOSIS — R97.20 ELEVATED PSA: ICD-10-CM

## 2024-08-23 DIAGNOSIS — R73.09 ELEVATED HEMOGLOBIN A1C: ICD-10-CM

## 2024-08-23 DIAGNOSIS — Z11.59 ENCOUNTER FOR HEPATITIS C SCREENING TEST FOR LOW RISK PATIENT: ICD-10-CM

## 2024-08-23 DIAGNOSIS — D72.829 LEUKOCYTOSIS, UNSPECIFIED TYPE: ICD-10-CM

## 2024-08-23 DIAGNOSIS — E55.9 VITAMIN D DEFICIENCY: ICD-10-CM

## 2024-08-23 DIAGNOSIS — Z12.11 COLON CANCER SCREENING: ICD-10-CM

## 2024-08-23 DIAGNOSIS — J45.909 MODERATE ASTHMA, UNSPECIFIED WHETHER COMPLICATED, UNSPECIFIED WHETHER PERSISTENT: ICD-10-CM

## 2024-08-23 DIAGNOSIS — Z00.00 MEDICARE ANNUAL WELLNESS VISIT, SUBSEQUENT: Primary | ICD-10-CM

## 2024-08-23 RX ORDER — CHOLECALCIFEROL (VITAMIN D3) 25 MCG
1 TABLET ORAL DAILY
Qty: 90 TABLET | Refills: 0 | Status: SHIPPED | OUTPATIENT
Start: 2024-08-23

## 2024-08-23 RX ORDER — BUDESONIDE AND FORMOTEROL FUMARATE DIHYDRATE 160; 4.5 UG/1; UG/1
2 AEROSOL RESPIRATORY (INHALATION) 2 TIMES DAILY
Qty: 10.2 G | Refills: 5 | Status: SHIPPED | OUTPATIENT
Start: 2024-08-23

## 2024-08-23 ASSESSMENT — PATIENT HEALTH QUESTIONNAIRE - PHQ9
SUM OF ALL RESPONSES TO PHQ QUESTIONS 1-9: 0
1. LITTLE INTEREST OR PLEASURE IN DOING THINGS: NOT AT ALL
SUM OF ALL RESPONSES TO PHQ QUESTIONS 1-9: 0
SUM OF ALL RESPONSES TO PHQ9 QUESTIONS 1 & 2: 0
SUM OF ALL RESPONSES TO PHQ QUESTIONS 1-9: 0
SUM OF ALL RESPONSES TO PHQ QUESTIONS 1-9: 0
2. FEELING DOWN, DEPRESSED OR HOPELESS: NOT AT ALL

## 2024-08-23 ASSESSMENT — LIFESTYLE VARIABLES
HOW MANY STANDARD DRINKS CONTAINING ALCOHOL DO YOU HAVE ON A TYPICAL DAY: PATIENT DOES NOT DRINK
HOW OFTEN DO YOU HAVE A DRINK CONTAINING ALCOHOL: NEVER

## 2024-08-23 NOTE — PATIENT INSTRUCTIONS
and put on gloves. Start by flossing:  Gently work a piece of floss between each of the teeth toward the gums. A plastic flossing tool may make this easier, and they are available at most [facility].  Curve the floss around each tooth into a U-shape and gently slide it under the gum line.  Move the floss firmly up and down several times to scrape off the plaque.  After you've finished flossing, throw away the used floss and begin brushing:  Wet the brush and apply toothpaste.  Place the brush at a 45-degree angle where the teeth meet the gums. Press firmly, and move the brush in small circles over the surface of the teeth.  Be careful not to brush too hard. Vigorous brushing can make the gums pull away from the teeth and can scratch the tooth enamel.  Brush all surfaces of the teeth, on the tongue side and on the cheek side. Pay special attention to the front teeth and all surfaces of the back teeth.  Brush chewing surfaces with short back-and-forth strokes.  After you've finished, help the person rinse the remaining toothpaste from their mouth.  Where can you learn more?  Go to https://www.[employer].net/patientEd and enter F944 to learn more about \"Learning About Dental Care for Older Adults.\"  Current as of: August 6, 2023  Content Version: 14.1  © 4678-2351 [employer].   Care instructions adapted under license by [employer]. If you have questions about a medical condition or this instruction, always ask your healthcare professional. [employer] disclaims any warranty or liability for your use of this information.           Learning About Vision Tests  What are vision tests?     The four most common vision tests are visual acuity tests, refraction, visual field tests, and color vision tests.  Visual acuity (sharpness) tests  These tests are used:  To see if you need glasses or contact lenses.  To monitor an eye problem.  To check an eye injury.  Visual acuity tests are done as part of
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
## 2024-08-23 NOTE — PROGRESS NOTES
Medicare Annual Wellness Visit    Prabhakar Moran is here for Medicare AWV and Discuss Labs    Assessment & Plan  1. Medicare wellness examination.  Recommendations were made for home safety improvements, including the installation of smoke detectors and non-slip mats or shower bars. He was encouraged to schedule an appointment with his eye doctor. His wife Ayana is his his health care decision maker.He was advised to establish a living will and provided with the necessary information.A stool card was provided for colon cancer screening.   2.Hypertension.  Blood pressure is controlled he was advised to continue his low-sodium diet and amlodipine for hypertension management.  Blood work results were reviewed. Blood glucose, kidney function, sodium, potassium, protein, calcium, and liver enzyme levels are within normal ranges.Total cholesterol is satisfactory at 166, with excellent triglyceride levels at 55 and  LDL cholesterol at 98.     3. Elevated PSA levels.  Prostate-Specific Antigen (PSA) level has increased from 2.7 in 2022 to 4.3 currently.   Referral to a urologist for further evaluation of elevated PSA levels was made.    4.. Asthma.  A prescription for a daily  Symbicort and Albuterol inhaler was provided to manage asthma and bronchitis. He was advised to continue using albuterol as needed for emergencies.    4.  Prediabetes.  A1c slightly high at 5.7.  Dietary modifications were suggested to manage prediabetes, including reducing intake of sweets, bread, pasta, rice, juices, chocolate, ice cream, and soda. Lab orders were given for hemoglobin A1c testing.    5. Health Maintenance.  He was advised to establish a living will and provided with the necessary information. Lab orders were given for hepatitis C testing.    Follow-up  A follow-up appointment is scheduled for 12/2024.    Recommendations for Preventive Services Due: see orders and patient instructions/AVS.  Recommended screening schedule for the

## 2024-08-30 LAB — HEMOCCULT STL QL IA: NEGATIVE

## 2024-08-31 RX ORDER — BACLOFEN 5 MG/1
5 TABLET ORAL NIGHTLY PRN
Qty: 90 TABLET | Refills: 0 | Status: SHIPPED | OUTPATIENT
Start: 2024-08-31

## 2024-09-02 PROBLEM — R97.20 ELEVATED PSA: Status: ACTIVE | Noted: 2024-09-02

## 2024-09-03 ENCOUNTER — OFFICE VISIT (OUTPATIENT)
Age: 69
End: 2024-09-03

## 2024-09-03 VITALS
HEART RATE: 86 BPM | WEIGHT: 180 LBS | DIASTOLIC BLOOD PRESSURE: 84 MMHG | BODY MASS INDEX: 25.77 KG/M2 | SYSTOLIC BLOOD PRESSURE: 141 MMHG | HEIGHT: 70 IN

## 2024-09-03 DIAGNOSIS — R97.20 ELEVATED PSA: Primary | ICD-10-CM

## 2024-09-03 DIAGNOSIS — N40.0 BENIGN PROSTATIC HYPERPLASIA, UNSPECIFIED WHETHER LOWER URINARY TRACT SYMPTOMS PRESENT: ICD-10-CM

## 2024-09-03 DIAGNOSIS — R35.1 NOCTURIA MORE THAN TWICE PER NIGHT: ICD-10-CM

## 2024-09-03 DIAGNOSIS — N41.9 PROSTATITIS, UNSPECIFIED PROSTATITIS TYPE: ICD-10-CM

## 2024-09-03 LAB
BILIRUBIN, URINE, POC: NEGATIVE
BLOOD URINE, POC: NEGATIVE
GLUCOSE URINE, POC: NEGATIVE
KETONES, URINE, POC: NEGATIVE
LEUKOCYTE ESTERASE, URINE, POC: NORMAL
NITRITE, URINE, POC: NEGATIVE
PH, URINE, POC: 6.5 (ref 4.6–8)
PROTEIN,URINE, POC: NEGATIVE
SPECIFIC GRAVITY, URINE, POC: 1.01 (ref 1–1.03)
URINALYSIS CLARITY, POC: CLEAR
URINALYSIS COLOR, POC: YELLOW
UROBILINOGEN, POC: NORMAL

## 2024-09-03 RX ORDER — CEFADROXIL 500 MG/1
500 CAPSULE ORAL 2 TIMES DAILY
Qty: 60 CAPSULE | Refills: 0 | Status: SHIPPED | OUTPATIENT
Start: 2024-09-03 | End: 2024-10-03

## 2024-09-03 ASSESSMENT — ENCOUNTER SYMPTOMS
SHORTNESS OF BREATH: 1
WHEEZING: 1
STRIDOR: 1
DIARRHEA: 1

## 2024-09-03 NOTE — PROGRESS NOTES
Chief Complaint   Patient presents with    New Patient    Elevated PSA        BP (!) 141/84   Pulse 86   Ht 1.778 m (5' 10\")   Wt 81.6 kg (180 lb)   BMI 25.83 kg/m²      PHQ-9 score is    Negative      1. \"Have you been to the ER, urgent care clinic since your last visit?  Hospitalized since your last visit?\" No    2. \"Have you seen or consulted any other health care providers outside of the [facility] since your last visit?\" No     3. For patients aged 45-75: Has the patient had a colonoscopy / FIT/ Cologuard? No      If the patient is female:    4. For patients aged 40-74: Has the patient had a mammogram within the past 2 years? NA - based on age or sex      5. For patients aged 21-65: Has the patient had a pap smear? NA - based on age or sex

## 2024-09-03 NOTE — PROGRESS NOTES
HISTORY OF PRESENT ILLNESS  Prabhakar Moran is a 69 y.o. male   Patient came in today with elevated PSA of 4.3..  Twice a night was the bathroom to void 3 or 4 times a day otherwise.  Denies dysuria hematuria no family history of prostate cancer he has had some diarrhea today unknown cause  1. Elevated PSA  Overview:  8/21/2024=4.3  Orders:  -     AMB POC URINALYSIS DIP STICK AUTO W/O MICRO  -     PSA, Total and Free  2. Prostatitis, unspecified prostatitis type  -     cefadroxil (DURICEF) 500 MG capsule; Take 1 capsule by mouth 2 times daily for 60 doses, Disp-60 capsule, R-0Normal  3. Benign prostatic hyperplasia, unspecified whether lower urinary tract symptoms present  4. Nocturia more than twice per night        PAST MEDICAL HISTORY  PMHx (including negatives):  has a past medical history of Asthma, Benign essential hypertension, Bronchiectasis without complication (HCC), Elevated fasting blood sugar, Eosinophilia, unspecified type, GERD (gastroesophageal reflux disease), Hypercholesterolemia, Hypertension, Mild anemia, Multinodular goiter, Obesity (BMI 30.0-34.9), Thrombocytopenia (HCC), Thrombocytopenia (HCC), Thrombocytopenia (HCC), Thyroid nodule, and Vitamin D deficiency.   PSurgHx:  has a past surgical history that includes IR GUIDED FNA (11/2/2022).  PSocHx:  reports that he has never smoked. He has never used smokeless tobacco. He reports that he does not drink alcohol and does not use drugs.   FamilyHX:   Family History   Problem Relation Age of Onset    High Blood Pressure Father     Hypertension Father        ROS  Review of Systems   HENT:  Positive for postnasal drip.    Respiratory:  Positive for shortness of breath, wheezing and stridor.    Gastrointestinal:  Positive for diarrhea.   Genitourinary:  Positive for frequency and urgency.   Musculoskeletal:  Positive for neck pain.   All other systems reviewed and are negative.        PHYSICAL EXAM  Physical Exam  Vitals and nursing note reviewed.

## 2024-09-25 DIAGNOSIS — J45.909 MODERATE ASTHMA, UNSPECIFIED WHETHER COMPLICATED, UNSPECIFIED WHETHER PERSISTENT: ICD-10-CM

## 2024-09-25 DIAGNOSIS — E55.9 VITAMIN D DEFICIENCY: ICD-10-CM

## 2024-09-25 DIAGNOSIS — I10 ESSENTIAL HYPERTENSION, BENIGN: ICD-10-CM

## 2024-09-25 RX ORDER — ALBUTEROL SULFATE 90 UG/1
INHALANT RESPIRATORY (INHALATION)
Qty: 18 EACH | Refills: 4 | Status: SHIPPED | OUTPATIENT
Start: 2024-09-25

## 2024-10-01 ENCOUNTER — TELEPHONE (OUTPATIENT)
Facility: CLINIC | Age: 69
End: 2024-10-01

## 2024-10-01 DIAGNOSIS — I10 ESSENTIAL HYPERTENSION, BENIGN: ICD-10-CM

## 2024-10-01 DIAGNOSIS — E55.9 VITAMIN D DEFICIENCY: ICD-10-CM

## 2024-10-01 DIAGNOSIS — N41.9 PROSTATITIS, UNSPECIFIED PROSTATITIS TYPE: ICD-10-CM

## 2024-10-01 DIAGNOSIS — J45.909 MODERATE ASTHMA, UNSPECIFIED WHETHER COMPLICATED, UNSPECIFIED WHETHER PERSISTENT: ICD-10-CM

## 2024-10-01 RX ORDER — IPRATROPIUM BROMIDE AND ALBUTEROL SULFATE 2.5; .5 MG/3ML; MG/3ML
SOLUTION RESPIRATORY (INHALATION)
Qty: 360 ML | Refills: 3 | Status: SHIPPED | OUTPATIENT
Start: 2024-10-01

## 2024-10-08 RX ORDER — CEFADROXIL 500 MG/1
500 CAPSULE ORAL 2 TIMES DAILY
Qty: 60 CAPSULE | Refills: 0 | OUTPATIENT
Start: 2024-10-08 | End: 2024-11-07

## 2024-11-16 ENCOUNTER — HOSPITAL ENCOUNTER (EMERGENCY)
Facility: HOSPITAL | Age: 69
Discharge: HOME | End: 2024-11-16
Attending: EMERGENCY MEDICINE
Payer: MEDICARE

## 2024-11-16 ENCOUNTER — APPOINTMENT (OUTPATIENT)
Facility: HOSPITAL | Age: 69
End: 2024-11-16
Payer: MEDICARE

## 2024-11-16 VITALS
OXYGEN SATURATION: 97 % | TEMPERATURE: 98 F | HEART RATE: 121 BPM | SYSTOLIC BLOOD PRESSURE: 123 MMHG | HEIGHT: 70 IN | BODY MASS INDEX: 28.63 KG/M2 | WEIGHT: 200 LBS | DIASTOLIC BLOOD PRESSURE: 73 MMHG | RESPIRATION RATE: 16 BRPM

## 2024-11-16 DIAGNOSIS — J40 BRONCHITIS: Primary | ICD-10-CM

## 2024-11-16 LAB
ALBUMIN SERPL-MCNC: 3.7 G/DL (ref 3.5–5)
ALBUMIN/GLOB SERPL: 0.9 (ref 1.1–2.2)
ALP SERPL-CCNC: 142 U/L (ref 45–117)
ALT SERPL-CCNC: 21 U/L (ref 12–78)
ANION GAP SERPL CALC-SCNC: 6 MMOL/L (ref 2–12)
AST SERPL W P-5'-P-CCNC: 16 U/L (ref 15–37)
BASOPHILS # BLD: 0.1 K/UL (ref 0–0.1)
BASOPHILS NFR BLD: 1 % (ref 0–1)
BILIRUB SERPL-MCNC: 0.5 MG/DL (ref 0.2–1)
BNP SERPL-MCNC: 97 PG/ML
BUN SERPL-MCNC: 12 MG/DL (ref 6–20)
BUN/CREAT SERPL: 16 (ref 12–20)
CA-I BLD-MCNC: 8.7 MG/DL (ref 8.5–10.1)
CHLORIDE SERPL-SCNC: 106 MMOL/L (ref 97–108)
CO2 SERPL-SCNC: 28 MMOL/L (ref 21–32)
CREAT SERPL-MCNC: 0.77 MG/DL (ref 0.7–1.3)
DIFFERENTIAL METHOD BLD: ABNORMAL
EKG ATRIAL RATE: 117 BPM
EKG DIAGNOSIS: NORMAL
EKG P AXIS: 68 DEGREES
EKG P-R INTERVAL: 152 MS
EKG Q-T INTERVAL: 308 MS
EKG QRS DURATION: 92 MS
EKG QTC CALCULATION (BAZETT): 429 MS
EKG R AXIS: -3 DEGREES
EKG T AXIS: 31 DEGREES
EKG VENTRICULAR RATE: 117 BPM
EOSINOPHIL # BLD: 0.7 K/UL (ref 0–0.4)
EOSINOPHIL NFR BLD: 7 % (ref 0–7)
ERYTHROCYTE [DISTWIDTH] IN BLOOD BY AUTOMATED COUNT: 12.4 % (ref 11.5–14.5)
GLOBULIN SER CALC-MCNC: 4.1 G/DL (ref 2–4)
GLUCOSE SERPL-MCNC: 103 MG/DL (ref 65–100)
HCT VFR BLD AUTO: 42.5 % (ref 36.6–50.3)
HGB BLD-MCNC: 14.4 G/DL (ref 12.1–17)
IMM GRANULOCYTES # BLD AUTO: 0 K/UL (ref 0–0.04)
IMM GRANULOCYTES NFR BLD AUTO: 0 % (ref 0–0.5)
LYMPHOCYTES # BLD: 1.7 K/UL (ref 0.8–3.5)
LYMPHOCYTES NFR BLD: 17 % (ref 12–49)
MCH RBC QN AUTO: 29.4 PG (ref 26–34)
MCHC RBC AUTO-ENTMCNC: 33.9 G/DL (ref 30–36.5)
MCV RBC AUTO: 86.9 FL (ref 80–99)
MONOCYTES # BLD: 0.8 K/UL (ref 0–1)
MONOCYTES NFR BLD: 8 % (ref 5–13)
NEUTS SEG # BLD: 6.6 K/UL (ref 1.8–8)
NEUTS SEG NFR BLD: 67 % (ref 32–75)
NRBC # BLD: 0 K/UL (ref 0–0.01)
NRBC BLD-RTO: 0 PER 100 WBC
PLATELET # BLD AUTO: 171 K/UL (ref 150–400)
PMV BLD AUTO: 9.9 FL (ref 8.9–12.9)
POTASSIUM SERPL-SCNC: 4 MMOL/L (ref 3.5–5.1)
PROT SERPL-MCNC: 7.8 G/DL (ref 6.4–8.2)
RBC # BLD AUTO: 4.89 M/UL (ref 4.1–5.7)
SODIUM SERPL-SCNC: 140 MMOL/L (ref 136–145)
TROPONIN I SERPL HS-MCNC: 4 NG/L (ref 0–76)
WBC # BLD AUTO: 9.9 K/UL (ref 4.1–11.1)

## 2024-11-16 PROCEDURE — 83880 ASSAY OF NATRIURETIC PEPTIDE: CPT

## 2024-11-16 PROCEDURE — 94640 AIRWAY INHALATION TREATMENT: CPT

## 2024-11-16 PROCEDURE — 70491 CT SOFT TISSUE NECK W/DYE: CPT

## 2024-11-16 PROCEDURE — 96365 THER/PROPH/DIAG IV INF INIT: CPT

## 2024-11-16 PROCEDURE — 6360000002 HC RX W HCPCS: Performed by: EMERGENCY MEDICINE

## 2024-11-16 PROCEDURE — 6360000004 HC RX CONTRAST MEDICATION: Performed by: EMERGENCY MEDICINE

## 2024-11-16 PROCEDURE — 94761 N-INVAS EAR/PLS OXIMETRY MLT: CPT

## 2024-11-16 PROCEDURE — 36415 COLL VENOUS BLD VENIPUNCTURE: CPT

## 2024-11-16 PROCEDURE — 6370000000 HC RX 637 (ALT 250 FOR IP)

## 2024-11-16 PROCEDURE — 80053 COMPREHEN METABOLIC PANEL: CPT

## 2024-11-16 PROCEDURE — 6370000000 HC RX 637 (ALT 250 FOR IP): Performed by: EMERGENCY MEDICINE

## 2024-11-16 PROCEDURE — 84484 ASSAY OF TROPONIN QUANT: CPT

## 2024-11-16 PROCEDURE — 99285 EMERGENCY DEPT VISIT HI MDM: CPT

## 2024-11-16 PROCEDURE — 2580000003 HC RX 258: Performed by: EMERGENCY MEDICINE

## 2024-11-16 PROCEDURE — 71045 X-RAY EXAM CHEST 1 VIEW: CPT

## 2024-11-16 PROCEDURE — 93005 ELECTROCARDIOGRAM TRACING: CPT | Performed by: EMERGENCY MEDICINE

## 2024-11-16 PROCEDURE — 2700000000 HC OXYGEN THERAPY PER DAY

## 2024-11-16 PROCEDURE — 85025 COMPLETE CBC W/AUTO DIFF WBC: CPT

## 2024-11-16 PROCEDURE — 96375 TX/PRO/DX INJ NEW DRUG ADDON: CPT

## 2024-11-16 RX ORDER — ACETAMINOPHEN 500 MG
1000 TABLET ORAL
Status: COMPLETED | OUTPATIENT
Start: 2024-11-16 | End: 2024-11-16

## 2024-11-16 RX ORDER — DOXYCYCLINE 100 MG/1
100 CAPSULE ORAL 2 TIMES DAILY
Qty: 14 CAPSULE | Refills: 0 | Status: SHIPPED | OUTPATIENT
Start: 2024-11-16 | End: 2024-11-23

## 2024-11-16 RX ORDER — IOPAMIDOL 755 MG/ML
100 INJECTION, SOLUTION INTRAVASCULAR
Status: COMPLETED | OUTPATIENT
Start: 2024-11-16 | End: 2024-11-16

## 2024-11-16 RX ORDER — AZITHROMYCIN 250 MG/1
TABLET, FILM COATED ORAL
Qty: 1 PACKET | Refills: 0 | Status: SHIPPED | OUTPATIENT
Start: 2024-11-16

## 2024-11-16 RX ORDER — DEXAMETHASONE SODIUM PHOSPHATE 10 MG/ML
10 INJECTION, SOLUTION INTRAMUSCULAR; INTRAVENOUS ONCE
Status: COMPLETED | OUTPATIENT
Start: 2024-11-16 | End: 2024-11-16

## 2024-11-16 RX ORDER — IPRATROPIUM BROMIDE AND ALBUTEROL SULFATE 2.5; .5 MG/3ML; MG/3ML
1 SOLUTION RESPIRATORY (INHALATION)
Status: COMPLETED | OUTPATIENT
Start: 2024-11-16 | End: 2024-11-16

## 2024-11-16 RX ORDER — IPRATROPIUM BROMIDE AND ALBUTEROL SULFATE 2.5; .5 MG/3ML; MG/3ML
SOLUTION RESPIRATORY (INHALATION)
Status: COMPLETED
Start: 2024-11-16 | End: 2024-11-16

## 2024-11-16 RX ORDER — PREDNISONE 50 MG/1
50 TABLET ORAL DAILY
Qty: 5 TABLET | Refills: 0 | Status: SHIPPED | OUTPATIENT
Start: 2024-11-16 | End: 2024-11-21

## 2024-11-16 RX ADMIN — IPRATROPIUM BROMIDE AND ALBUTEROL SULFATE 1 DOSE: 2.5; .5 SOLUTION RESPIRATORY (INHALATION) at 13:13

## 2024-11-16 RX ADMIN — IPRATROPIUM BROMIDE AND ALBUTEROL SULFATE 1 DOSE: 2.5; .5 SOLUTION RESPIRATORY (INHALATION) at 14:37

## 2024-11-16 RX ADMIN — ACETAMINOPHEN 1000 MG: 500 TABLET ORAL at 18:46

## 2024-11-16 RX ADMIN — CEFTRIAXONE SODIUM 1000 MG: 1 INJECTION, POWDER, FOR SOLUTION INTRAMUSCULAR; INTRAVENOUS at 14:37

## 2024-11-16 RX ADMIN — RACEPINEPHRINE HYDROCHLORIDE 0.5 ML: 11.25 SOLUTION RESPIRATORY (INHALATION) at 18:24

## 2024-11-16 RX ADMIN — DEXAMETHASONE SODIUM PHOSPHATE 10 MG: 10 INJECTION INTRAMUSCULAR; INTRAVENOUS at 18:21

## 2024-11-16 RX ADMIN — METHYLPREDNISOLONE SODIUM SUCCINATE 125 MG: 125 INJECTION INTRAMUSCULAR; INTRAVENOUS at 14:37

## 2024-11-16 RX ADMIN — IOPAMIDOL 100 ML: 755 INJECTION, SOLUTION INTRAVENOUS at 18:57

## 2024-11-16 RX ADMIN — AZITHROMYCIN MONOHYDRATE 500 MG: 500 INJECTION, POWDER, LYOPHILIZED, FOR SOLUTION INTRAVENOUS at 14:46

## 2024-11-16 ASSESSMENT — PAIN SCALES - GENERAL: PAINLEVEL_OUTOF10: 7

## 2024-11-16 ASSESSMENT — LIFESTYLE VARIABLES
HOW OFTEN DO YOU HAVE A DRINK CONTAINING ALCOHOL: NEVER
HOW MANY STANDARD DRINKS CONTAINING ALCOHOL DO YOU HAVE ON A TYPICAL DAY: PATIENT DOES NOT DRINK

## 2024-11-16 ASSESSMENT — PAIN - FUNCTIONAL ASSESSMENT: PAIN_FUNCTIONAL_ASSESSMENT: NONE - DENIES PAIN

## 2024-11-16 ASSESSMENT — PAIN DESCRIPTION - LOCATION: LOCATION: HEAD

## 2024-11-16 NOTE — ED PROVIDER NOTES
interpreting tests but excluding time spent in any separately billed procedures and excluding time spent treating other patients and/or teaching time.   Discussion with consultant: n/a.  Clinical Concern: stridor, bronchitis.   Intervention: racemic epinephrine.  Bradley Villalba D.O.    DISPOSITION/PLAN   DISPOSITION Decision To Discharge 11/16/2024 08:47:42 PM           Discharged.     PATIENT REFERRED TO:  Krish Langston MD  [address]  [place] [address] [phone]      ENT Doctor    Mahesh Crespo Jr., MD  [address]  [facility] [address] [phone]      ENT Doctor    Mick Davison MD  [address] [facility] [address] [phone]      Lung Doctor      DISCHARGE MEDICATIONS:     Medication List        START taking these medications      azithromycin 250 MG tablet  Commonly known as: Zithromax Z-Barrett  Take 2 tablets (500 mg) on Day 1, and then take 1 tablet (250 mg) on days 2 through 5.     doxycycline hyclate 100 MG capsule  Commonly known as: VIBRAMYCIN  Take 1 capsule by mouth 2 times daily for 7 days     predniSONE 50 MG tablet  Commonly known as: DELTASONE  Take 1 tablet by mouth daily for 5 days            ASK your doctor about these medications      albuterol sulfate  (90 Base) MCG/ACT inhaler  Commonly known as: PROVENTIL;VENTOLIN;PROAIR  INHALE 1 PUFF BY MOUTH EVERY 6 HOURS AS NEEDED     amLODIPine 10 MG tablet  Commonly known as: NORVASC  Take 1 tablet by mouth nightly     Baclofen 5 MG tablet  Commonly known as: LIORESAL  TAKE 1 TABLET BY MOUTH NIGHTLY AS NEEDED (NECK PAIN)     budesonide 180 MCG/ACT Aepb inhaler  Commonly known as: Pulmicort Flexhaler  Inhale 2 puffs into the lungs in the morning and 2 puffs in the evening.     budesonide-formoterol 160-4.5 MCG/ACT Aero  Commonly known as: Symbicort  Inhale 2 puffs into the lungs 2 times daily     Centrum Silver 50+Men Tabs  TAKE 1 TABLET BY MOUTH EVERY DAY     fluticasone 50  PAIN)     budesonide 180 MCG/ACT Aepb inhaler  Commonly known as: Pulmicort Flexhaler  Inhale 2 puffs into the lungs in the morning and 2 puffs in the evening.     budesonide-formoterol 160-4.5 MCG/ACT Aero  Commonly known as: Symbicort  Inhale 2 puffs into the lungs 2 times daily     Centrum Silver 50+Men Tabs  TAKE 1 TABLET BY MOUTH EVERY DAY     fluticasone 50 MCG/ACT nasal spray  Commonly known as: FLONASE  fluticasone propionate 50 mcg/actuation nasal spray,suspension     ipratropium 0.5 mg-albuterol 2.5 mg 0.5-2.5 (3) MG/3ML Soln nebulizer solution  Commonly known as: DUONEB  USE 1 VIAL IN NEBULIZER 4 TIMES A DAY     vitamin D3 25 MCG (1000 UT) Tabs tablet  Commonly known as: CHOLECALCIFEROL  Take 1 tablet by mouth daily              DISCONTINUED MEDICATIONS:  Current Discharge Medication List        ED FINAL IMPRESSION   No diagnosis found.   ***    I am the primary provider of record. Bradley Villalba DO (electronically signed)    (Please note that parts of this dictation were completed with voice recognition software. Quite often unanticipated grammatical, syntax, homophones, and other interpretive errors are inadvertently transcribed by the computer software. Please disregards these errors. Please excuse any errors that have escaped final proofreading.)

## 2024-11-17 NOTE — DISCHARGE INSTRUCTIONS
Thank you for choosing our Emergency Department for your care.  It is our privilege to care for you in your time of need.  In the next several days, you may receive a survey via email or mailed to your home about your experience with our team.  We would greatly appreciate you taking a few minutes to complete the survey, as we use this information to learn what we have done well and what we could be doing better. Thank you for trusting us with your care!    Below you will find a list of your tests from today's visit.   Labs    Radiologic Studies  CT SOFT TISSUE NECK W CONTRAST   Final Result   1.  There are no acute abnormalities.   2.  There is marked enlargement and heterogeneity involving the right thyroid   lobe causing mild deviation of the trachea towards the left. The airway is   widely patent.   3.  Pansinusitis.   4.  Multilevel degenerative changes of the mid to lower cervical spine.            Electronically signed by REYNALDO Ng      XR CHEST 1 VIEW   Final Result   No evidence of acute cardiopulmonary process.         Electronically signed by Derik Rae MD        ------------------------------------------------------------------------------------------------------------  The evaluation and treatment you received in the Emergency Department were for an urgent problem. It is important that you follow-up with a doctor, nurse practitioner, or physician assistant to:  (1) confirm your diagnosis,  (2) re-evaluation of changes in your illness and treatment, and (3) for ongoing care. Please take your discharge instructions with you when you go to your follow-up appointment.     If you have any problem arranging a follow-up appointment, contact us!  If your symptoms become worse or you do not improve as expected, please return to us. We are available 24 hours a day.     If a prescription has been provided, please fill it as soon as possible to prevent a delay in treatment. If you have any questions or

## 2024-11-17 NOTE — ED NOTES
Pt noted to be on 5L O2 via NC at time of discharge. Patient and wife states that he is normally not on oxygen. Slowly tapered O2 down to zero. Patient sats remained 96% and above. Patient not complaining of SOB or chest discomfort at this time. Primary RN made aware. Patient discharged home.

## 2024-11-24 RX ORDER — BACLOFEN 5 MG/1
5 TABLET ORAL NIGHTLY PRN
Qty: 30 TABLET | Refills: 1 | Status: SHIPPED | OUTPATIENT
Start: 2024-11-24

## 2024-12-10 RX ORDER — BACLOFEN 5 MG/1
5 TABLET ORAL NIGHTLY PRN
Qty: 90 TABLET | Refills: 1 | Status: ON HOLD | OUTPATIENT
Start: 2024-12-10

## 2024-12-11 ENCOUNTER — APPOINTMENT (OUTPATIENT)
Facility: HOSPITAL | Age: 69
DRG: 189 | End: 2024-12-11
Payer: MEDICARE

## 2024-12-11 ENCOUNTER — HOSPITAL ENCOUNTER (EMERGENCY)
Facility: HOSPITAL | Age: 69
Discharge: HOME | DRG: 189 | End: 2024-12-11
Attending: EMERGENCY MEDICINE
Payer: MEDICARE

## 2024-12-11 VITALS
HEART RATE: 91 BPM | WEIGHT: 200 LBS | DIASTOLIC BLOOD PRESSURE: 65 MMHG | HEIGHT: 70 IN | SYSTOLIC BLOOD PRESSURE: 110 MMHG | TEMPERATURE: 98 F | BODY MASS INDEX: 28.63 KG/M2 | OXYGEN SATURATION: 97 % | RESPIRATION RATE: 18 BRPM

## 2024-12-11 DIAGNOSIS — J40 BRONCHITIS: Primary | ICD-10-CM

## 2024-12-11 DIAGNOSIS — J98.01 BRONCHOSPASM: ICD-10-CM

## 2024-12-11 LAB
ALBUMIN SERPL-MCNC: 3.5 G/DL (ref 3.5–5)
ALBUMIN/GLOB SERPL: 1 (ref 1.1–2.2)
ALP SERPL-CCNC: 124 U/L (ref 45–117)
ALT SERPL-CCNC: 20 U/L (ref 12–78)
ANION GAP SERPL CALC-SCNC: 3 MMOL/L (ref 2–12)
AST SERPL W P-5'-P-CCNC: 15 U/L (ref 15–37)
BASOPHILS # BLD: 0 K/UL (ref 0–0.1)
BASOPHILS NFR BLD: 0 % (ref 0–1)
BILIRUB SERPL-MCNC: 0.6 MG/DL (ref 0.2–1)
BNP SERPL-MCNC: 65 PG/ML
BUN SERPL-MCNC: 10 MG/DL (ref 6–20)
BUN/CREAT SERPL: 14 (ref 12–20)
CA-I BLD-MCNC: 9 MG/DL (ref 8.5–10.1)
CHLORIDE SERPL-SCNC: 108 MMOL/L (ref 97–108)
CO2 SERPL-SCNC: 30 MMOL/L (ref 21–32)
CREAT SERPL-MCNC: 0.72 MG/DL (ref 0.7–1.3)
DIFFERENTIAL METHOD BLD: ABNORMAL
EKG ATRIAL RATE: 90 BPM
EKG DIAGNOSIS: NORMAL
EKG P AXIS: 44 DEGREES
EKG P-R INTERVAL: 164 MS
EKG Q-T INTERVAL: 350 MS
EKG QRS DURATION: 94 MS
EKG QTC CALCULATION (BAZETT): 428 MS
EKG R AXIS: 18 DEGREES
EKG T AXIS: 50 DEGREES
EKG VENTRICULAR RATE: 90 BPM
EOSINOPHIL # BLD: 0.4 K/UL (ref 0–0.4)
EOSINOPHIL NFR BLD: 4 % (ref 0–7)
ERYTHROCYTE [DISTWIDTH] IN BLOOD BY AUTOMATED COUNT: 12.5 % (ref 11.5–14.5)
FLUAV RNA SPEC QL NAA+PROBE: NOT DETECTED
FLUBV RNA SPEC QL NAA+PROBE: NOT DETECTED
GLOBULIN SER CALC-MCNC: 3.6 G/DL (ref 2–4)
GLUCOSE SERPL-MCNC: 99 MG/DL (ref 65–100)
HCT VFR BLD AUTO: 41 % (ref 36.6–50.3)
HGB BLD-MCNC: 13.7 G/DL (ref 12.1–17)
IMM GRANULOCYTES # BLD AUTO: 0 K/UL (ref 0–0.04)
IMM GRANULOCYTES NFR BLD AUTO: 0 % (ref 0–0.5)
LYMPHOCYTES # BLD: 1.2 K/UL (ref 0.8–3.5)
LYMPHOCYTES NFR BLD: 13 % (ref 12–49)
MCH RBC QN AUTO: 29.1 PG (ref 26–34)
MCHC RBC AUTO-ENTMCNC: 33.4 G/DL (ref 30–36.5)
MCV RBC AUTO: 87.2 FL (ref 80–99)
MONOCYTES # BLD: 0.7 K/UL (ref 0–1)
MONOCYTES NFR BLD: 8 % (ref 5–13)
NEUTS SEG # BLD: 6.8 K/UL (ref 1.8–8)
NEUTS SEG NFR BLD: 75 % (ref 32–75)
NRBC # BLD: 0 K/UL (ref 0–0.01)
NRBC BLD-RTO: 0 PER 100 WBC
PLATELET # BLD AUTO: 146 K/UL (ref 150–400)
PMV BLD AUTO: 9.4 FL (ref 8.9–12.9)
POTASSIUM SERPL-SCNC: 3.9 MMOL/L (ref 3.5–5.1)
PROCALCITONIN SERPL-MCNC: <0.05 NG/ML
PROT SERPL-MCNC: 7.1 G/DL (ref 6.4–8.2)
RBC # BLD AUTO: 4.7 M/UL (ref 4.1–5.7)
SARS-COV-2 RNA RESP QL NAA+PROBE: NOT DETECTED
SODIUM SERPL-SCNC: 141 MMOL/L (ref 136–145)
TROPONIN I SERPL HS-MCNC: 5 NG/L (ref 0–76)
WBC # BLD AUTO: 9.1 K/UL (ref 4.1–11.1)

## 2024-12-11 PROCEDURE — 6370000000 HC RX 637 (ALT 250 FOR IP): Performed by: EMERGENCY MEDICINE

## 2024-12-11 PROCEDURE — 94761 N-INVAS EAR/PLS OXIMETRY MLT: CPT

## 2024-12-11 PROCEDURE — 83880 ASSAY OF NATRIURETIC PEPTIDE: CPT

## 2024-12-11 PROCEDURE — 94640 AIRWAY INHALATION TREATMENT: CPT

## 2024-12-11 PROCEDURE — 6360000002 HC RX W HCPCS: Performed by: EMERGENCY MEDICINE

## 2024-12-11 PROCEDURE — 80053 COMPREHEN METABOLIC PANEL: CPT

## 2024-12-11 PROCEDURE — 93005 ELECTROCARDIOGRAM TRACING: CPT | Performed by: EMERGENCY MEDICINE

## 2024-12-11 PROCEDURE — 87636 SARSCOV2 & INF A&B AMP PRB: CPT

## 2024-12-11 PROCEDURE — 99285 EMERGENCY DEPT VISIT HI MDM: CPT

## 2024-12-11 PROCEDURE — 84484 ASSAY OF TROPONIN QUANT: CPT

## 2024-12-11 PROCEDURE — 85025 COMPLETE CBC W/AUTO DIFF WBC: CPT

## 2024-12-11 PROCEDURE — 36415 COLL VENOUS BLD VENIPUNCTURE: CPT

## 2024-12-11 PROCEDURE — 96375 TX/PRO/DX INJ NEW DRUG ADDON: CPT

## 2024-12-11 PROCEDURE — 84145 PROCALCITONIN (PCT): CPT

## 2024-12-11 PROCEDURE — 6370000000 HC RX 637 (ALT 250 FOR IP)

## 2024-12-11 PROCEDURE — 96374 THER/PROPH/DIAG INJ IV PUSH: CPT

## 2024-12-11 PROCEDURE — 71045 X-RAY EXAM CHEST 1 VIEW: CPT

## 2024-12-11 RX ORDER — IPRATROPIUM BROMIDE AND ALBUTEROL SULFATE 2.5; .5 MG/3ML; MG/3ML
SOLUTION RESPIRATORY (INHALATION)
Status: DISCONTINUED
Start: 2024-12-11 | End: 2024-12-11 | Stop reason: HOSPADM

## 2024-12-11 RX ORDER — IPRATROPIUM BROMIDE AND ALBUTEROL SULFATE 2.5; .5 MG/3ML; MG/3ML
1 SOLUTION RESPIRATORY (INHALATION)
Status: COMPLETED | OUTPATIENT
Start: 2024-12-11 | End: 2024-12-11

## 2024-12-11 RX ORDER — ALBUTEROL SULFATE 90 UG/1
2 INHALANT RESPIRATORY (INHALATION) 4 TIMES DAILY PRN
Qty: 18 G | Refills: 0 | Status: SHIPPED | OUTPATIENT
Start: 2024-12-11

## 2024-12-11 RX ORDER — AZITHROMYCIN 250 MG/1
250 TABLET, FILM COATED ORAL SEE ADMIN INSTRUCTIONS
Qty: 6 TABLET | Refills: 0 | Status: ON HOLD | OUTPATIENT
Start: 2024-12-11 | End: 2024-12-16 | Stop reason: HOSPADM

## 2024-12-11 RX ORDER — METHYLPREDNISOLONE SODIUM SUCCINATE 125 MG/2ML
125 INJECTION INTRAMUSCULAR; INTRAVENOUS ONCE
Status: COMPLETED | OUTPATIENT
Start: 2024-12-11 | End: 2024-12-11

## 2024-12-11 RX ORDER — IPRATROPIUM BROMIDE AND ALBUTEROL SULFATE 2.5; .5 MG/3ML; MG/3ML
1 SOLUTION RESPIRATORY (INHALATION) ONCE
Status: COMPLETED | OUTPATIENT
Start: 2024-12-11 | End: 2024-12-11

## 2024-12-11 RX ORDER — GUAIFENESIN/DEXTROMETHORPHAN 100-10MG/5
5 SYRUP ORAL 3 TIMES DAILY PRN
Qty: 120 ML | Refills: 0 | Status: SHIPPED | OUTPATIENT
Start: 2024-12-11 | End: 2024-12-19

## 2024-12-11 RX ORDER — MORPHINE SULFATE 4 MG/ML
4 INJECTION, SOLUTION INTRAMUSCULAR; INTRAVENOUS
Status: COMPLETED | OUTPATIENT
Start: 2024-12-11 | End: 2024-12-11

## 2024-12-11 RX ORDER — IPRATROPIUM BROMIDE AND ALBUTEROL SULFATE 2.5; .5 MG/3ML; MG/3ML
1 SOLUTION RESPIRATORY (INHALATION) ONCE
Status: DISCONTINUED | OUTPATIENT
Start: 2024-12-11 | End: 2024-12-11

## 2024-12-11 RX ORDER — PREDNISONE 20 MG/1
60 TABLET ORAL DAILY
Qty: 15 TABLET | Refills: 0 | Status: SHIPPED | OUTPATIENT
Start: 2024-12-11 | End: 2024-12-16

## 2024-12-11 RX ORDER — ASPIRIN 325 MG
325 TABLET ORAL
Status: COMPLETED | OUTPATIENT
Start: 2024-12-11 | End: 2024-12-11

## 2024-12-11 RX ORDER — IPRATROPIUM BROMIDE AND ALBUTEROL SULFATE 2.5; .5 MG/3ML; MG/3ML
SOLUTION RESPIRATORY (INHALATION)
Status: COMPLETED
Start: 2024-12-11 | End: 2024-12-11

## 2024-12-11 RX ORDER — ONDANSETRON 2 MG/ML
4 INJECTION INTRAMUSCULAR; INTRAVENOUS ONCE
Status: COMPLETED | OUTPATIENT
Start: 2024-12-11 | End: 2024-12-11

## 2024-12-11 RX ADMIN — IPRATROPIUM BROMIDE AND ALBUTEROL SULFATE 1 DOSE: 2.5; .5 SOLUTION RESPIRATORY (INHALATION) at 14:30

## 2024-12-11 RX ADMIN — ONDANSETRON 4 MG: 2 INJECTION INTRAMUSCULAR; INTRAVENOUS at 11:12

## 2024-12-11 RX ADMIN — IPRATROPIUM BROMIDE AND ALBUTEROL SULFATE 1 DOSE: 2.5; .5 SOLUTION RESPIRATORY (INHALATION) at 12:23

## 2024-12-11 RX ADMIN — METHYLPREDNISOLONE SODIUM SUCCINATE 125 MG: 125 INJECTION INTRAMUSCULAR; INTRAVENOUS at 12:23

## 2024-12-11 RX ADMIN — ASPIRIN 325 MG: 325 TABLET ORAL at 11:12

## 2024-12-11 RX ADMIN — IPRATROPIUM BROMIDE AND ALBUTEROL SULFATE 1 DOSE: 2.5; .5 SOLUTION RESPIRATORY (INHALATION) at 11:29

## 2024-12-11 RX ADMIN — MORPHINE SULFATE 4 MG: 4 INJECTION, SOLUTION INTRAMUSCULAR; INTRAVENOUS at 11:13

## 2024-12-11 ASSESSMENT — PAIN - FUNCTIONAL ASSESSMENT
PAIN_FUNCTIONAL_ASSESSMENT: NONE - DENIES PAIN

## 2024-12-11 ASSESSMENT — PAIN SCALES - GENERAL
PAINLEVEL_OUTOF10: 0
PAINLEVEL_OUTOF10: 0
PAINLEVEL_OUTOF10: 10

## 2024-12-11 ASSESSMENT — PAIN DESCRIPTION - LOCATION: LOCATION: CHEST

## 2024-12-11 NOTE — ED PROVIDER NOTES
Influenza A By PCR Not Detected   Rapid Influenza B By PCR Not Detected  Lab work interpreted independently by ER physician as NORMAL   [HP]   1127 NT Pro-BNP: 65  Lab work interpreted independently by ER physician as NORMAL   [HP]   1127 Troponin, High Sensitivity: 5  Lab work interpreted independently by ER physician as NORMAL   [HP]   1127 Comprehensive Metabolic Panel(!):    Sodium 141   Potassium 3.9   Chloride 108   CARBON DIOXIDE 30   Anion Gap 3   Glucose 99   BUN,BUNPL 10   Creatinine 0.72   Bun/Cre 14   Est, Glom Filt Rate >90   Calcium 9.0   Total Bilirubin 0.6   AST 15   ALT 20   Alkaline Phosphatase 124(!)   Total Protein 7.1   Albumin 3.5   Globulin 3.6   Albumin/Globulin Ratio 1.0(!)  normal   [HP]   1127 CBC with Auto Differential(!):    WBC 9.1   RBC 4.70   Hemoglobin Quant 13.7   Hematocrit 41.0   MCV 87.2   MCH 29.1   MCHC 33.4   RDW 12.5   Platelet Count 146(!)   MPV 9.4   Nucleated Red Blood Cells 0.0   Nucleated Red Blood Cells 0.00   Neutrophils % 75   Lymphocyte % 13   Monocytes % 8   Eosinophils % 4   Basophils % 0   Immature Granulocytes % 0   Neutrophils Absolute 6.8   Lymphocytes Absolute 1.2   Monocytes Absolute 0.7   Eosinophils Absolute 0.4   Basophils Absolute 0.0   Immature Granulocytes Absolute 0.0   Differential Type AUTOMATED  Lab work interpreted independently by ER physician as NORMAL   [HP]   1127 XR CHEST 1 VIEW  FINDINGS: A portable AP radiograph of the chest was obtained at 1035 hours. The  heart size is normal. The lungs are clear. Osseous structures are unremarkable.     IMPRESSION:  1. No acute process     Electronically signed by GIO RUIZ    Chest x-ray interpreted independently by ER physician.  No evidence of pneumothorax, rib fracture, pleural effusion pneumonia or dissection.  Negative acute.   [HP]   1148 Procalcitonin(!): <0.05  Lab work interpreted independently by ER physician as NORMAL   [HP]   1148 Patient without evidence of COVID flu or pneumonia.  [place]  [id number]  [phone]          Adria Amor MD  [address]  [address] [address] [phone]    Schedule an appointment as soon as possible for a visit           DISCHARGE MEDICATIONS:     Medication List        START taking these medications      guaiFENesin-dextromethorphan 100-10 MG/5ML syrup  Commonly known as: ROBITUSSIN DM  Take 5 mLs by mouth 3 times daily as needed for Cough     predniSONE 20 MG tablet  Commonly known as: DELTASONE  Take 3 tablets by mouth daily for 5 days            CHANGE how you take these medications      * albuterol sulfate  (90 Base) MCG/ACT inhaler  Commonly known as: PROVENTIL;VENTOLIN;PROAIR  INHALE 1 PUFF BY MOUTH EVERY 6 HOURS AS NEEDED  What changed: Another medication with the same name was added. Make sure you understand how and when to take each.     * albuterol sulfate  (90 Base) MCG/ACT inhaler  Commonly known as: Ventolin HFA  Inhale 2 puffs into the lungs 4 times daily as needed for Wheezing  What changed: You were already taking a medication with the same name, and this prescription was added. Make sure you understand how and when to take each.     * azithromycin 250 MG tablet  Commonly known as: Zithromax Z-Barrett  Take 2 tablets (500 mg) on Day 1, and then take 1 tablet (250 mg) on days 2 through 5.  What changed: Another medication with the same name was added. Make sure you understand how and when to take each.     * azithromycin 250 MG tablet  Commonly known as: ZITHROMAX  Take 1 tablet by mouth See Admin Instructions for 5 days 500mg on day 1 followed by 250mg on days 2 - 5  What changed: You were already taking a medication with the same name, and this prescription was added. Make sure you understand how and when to take each.           * This list has 4 medication(s) that are the same as other medications prescribed for you. Read the directions carefully, and ask your doctor or other care provider to review them with you.

## 2024-12-11 NOTE — DISCHARGE INSTRUCTIONS
Thank you for choosing our Emergency Department for your care.  It is our privilege to care for you in your time of need.  In the next several days, you may receive a survey via email or mailed to your home about your experience with our team.  We would greatly appreciate you taking a few minutes to complete the survey, as we use this information to learn what we have done well and what we could be doing better. Thank you for trusting us with your care!    Below you will find a list of your tests from today's visit.   Labs and Radiology Studies  Recent Results (from the past 12 hour(s))   CBC with Auto Differential    Collection Time: 12/11/24 10:42 AM   Result Value Ref Range    WBC 9.1 4.1 - 11.1 K/uL    RBC 4.70 4.10 - 5.70 M/uL    Hemoglobin 13.7 12.1 - 17.0 g/dL    Hematocrit 41.0 36.6 - 50.3 %    MCV 87.2 80.0 - 99.0 FL    MCH 29.1 26.0 - 34.0 PG    MCHC 33.4 30.0 - 36.5 g/dL    RDW 12.5 11.5 - 14.5 %    Platelets 146 (L) 150 - 400 K/uL    MPV 9.4 8.9 - 12.9 FL    Nucleated RBCs 0.0 0.0  WBC    nRBC 0.00 0.00 - 0.01 K/uL    Neutrophils % 75 32 - 75 %    Lymphocytes % 13 12 - 49 %    Monocytes % 8 5 - 13 %    Eosinophils % 4 0 - 7 %    Basophils % 0 0 - 1 %    Immature Granulocytes % 0 0 - 0.5 %    Neutrophils Absolute 6.8 1.8 - 8.0 K/UL    Lymphocytes Absolute 1.2 0.8 - 3.5 K/UL    Monocytes Absolute 0.7 0.0 - 1.0 K/UL    Eosinophils Absolute 0.4 0.0 - 0.4 K/UL    Basophils Absolute 0.0 0.0 - 0.1 K/UL    Immature Granulocytes Absolute 0.0 0.00 - 0.04 K/UL    Differential Type AUTOMATED     Troponin    Collection Time: 12/11/24 10:42 AM   Result Value Ref Range    Troponin, High Sensitivity 5 0 - 76 ng/L   Comprehensive Metabolic Panel    Collection Time: 12/11/24 10:42 AM   Result Value Ref Range    Sodium 141 136 - 145 mmol/L    Potassium 3.9 3.5 - 5.1 mmol/L    Chloride 108 97 - 108 mmol/L    CO2 30 21 - 32 mmol/L    Anion Gap 3 2 - 12 mmol/L    Glucose 99 65 - 100 mg/dL    BUN 10 6 - 20 mg/dL

## 2024-12-13 ENCOUNTER — HOSPITAL ENCOUNTER (INPATIENT)
Facility: HOSPITAL | Age: 69
LOS: 3 days | Discharge: HOME | DRG: 189 | End: 2024-12-16
Attending: STUDENT IN AN ORGANIZED HEALTH CARE EDUCATION/TRAINING PROGRAM | Admitting: STUDENT IN AN ORGANIZED HEALTH CARE EDUCATION/TRAINING PROGRAM
Payer: MEDICARE

## 2024-12-13 ENCOUNTER — APPOINTMENT (OUTPATIENT)
Facility: HOSPITAL | Age: 69
DRG: 189 | End: 2024-12-13
Payer: MEDICARE

## 2024-12-13 DIAGNOSIS — J45.909 MODERATE ASTHMA, UNSPECIFIED WHETHER COMPLICATED, UNSPECIFIED WHETHER PERSISTENT: ICD-10-CM

## 2024-12-13 DIAGNOSIS — E55.9 VITAMIN D DEFICIENCY: ICD-10-CM

## 2024-12-13 DIAGNOSIS — J44.1 COPD EXACERBATION (HCC): Primary | ICD-10-CM

## 2024-12-13 DIAGNOSIS — I10 ESSENTIAL HYPERTENSION, BENIGN: ICD-10-CM

## 2024-12-13 LAB
ALBUMIN SERPL-MCNC: 3.5 G/DL (ref 3.5–5)
ALBUMIN/GLOB SERPL: 0.8 (ref 1.1–2.2)
ALP SERPL-CCNC: 117 U/L (ref 45–117)
ALT SERPL-CCNC: 25 U/L (ref 12–78)
ANION GAP SERPL CALC-SCNC: 7 MMOL/L (ref 2–12)
APPEARANCE UR: CLEAR
AST SERPL W P-5'-P-CCNC: 34 U/L (ref 15–37)
B PERT DNA SPEC QL NAA+PROBE: NOT DETECTED
BACTERIA URNS QL MICRO: NEGATIVE /HPF
BASE EXCESS BLD CALC-SCNC: 5.6 MMOL/L
BASOPHILS # BLD: 0 K/UL (ref 0–0.1)
BASOPHILS NFR BLD: 0 % (ref 0–1)
BILIRUB SERPL-MCNC: 0.5 MG/DL (ref 0.2–1)
BILIRUB UR QL: NEGATIVE
BORDETELLA PARAPERTUSSIS BY PCR: NOT DETECTED
BUN SERPL-MCNC: 21 MG/DL (ref 6–20)
BUN/CREAT SERPL: 21 (ref 12–20)
C PNEUM DNA SPEC QL NAA+PROBE: NOT DETECTED
CA-I BLD-MCNC: 8.9 MG/DL (ref 8.5–10.1)
CHLORIDE SERPL-SCNC: 107 MMOL/L (ref 97–108)
CO2 SERPL-SCNC: 28 MMOL/L (ref 21–32)
COLOR UR: ABNORMAL
CREAT SERPL-MCNC: 1.01 MG/DL (ref 0.7–1.3)
DIFFERENTIAL METHOD BLD: ABNORMAL
EKG ATRIAL RATE: 123 BPM
EKG DIAGNOSIS: NORMAL
EKG P AXIS: 68 DEGREES
EKG P-R INTERVAL: 142 MS
EKG Q-T INTERVAL: 304 MS
EKG QRS DURATION: 82 MS
EKG QTC CALCULATION (BAZETT): 435 MS
EKG R AXIS: 48 DEGREES
EKG T AXIS: 66 DEGREES
EKG VENTRICULAR RATE: 123 BPM
EOSINOPHIL # BLD: 0 K/UL (ref 0–0.4)
EOSINOPHIL NFR BLD: 0 % (ref 0–7)
EPITH CASTS URNS QL MICRO: ABNORMAL /LPF
ERYTHROCYTE [DISTWIDTH] IN BLOOD BY AUTOMATED COUNT: 12.5 % (ref 11.5–14.5)
FLUAV H1 2009 PAND RNA SPEC QL NAA+PROBE: NOT DETECTED
FLUAV H1 RNA SPEC QL NAA+PROBE: NOT DETECTED
FLUAV H3 RNA SPEC QL NAA+PROBE: NOT DETECTED
FLUAV RNA SPEC QL NAA+PROBE: NOT DETECTED
FLUAV SUBTYP SPEC NAA+PROBE: NOT DETECTED
FLUBV RNA SPEC QL NAA+PROBE: NOT DETECTED
FLUBV RNA SPEC QL NAA+PROBE: NOT DETECTED
GLOBULIN SER CALC-MCNC: 4.2 G/DL (ref 2–4)
GLUCOSE SERPL-MCNC: 145 MG/DL (ref 65–100)
GLUCOSE UR STRIP.AUTO-MCNC: NEGATIVE MG/DL
HADV DNA SPEC QL NAA+PROBE: NOT DETECTED
HCO3 BLD-SCNC: 33.7 MMOL/L (ref 19–28)
HCOV 229E RNA SPEC QL NAA+PROBE: NOT DETECTED
HCOV HKU1 RNA SPEC QL NAA+PROBE: NOT DETECTED
HCOV NL63 RNA SPEC QL NAA+PROBE: NOT DETECTED
HCOV OC43 RNA SPEC QL NAA+PROBE: NOT DETECTED
HCT VFR BLD AUTO: 44.2 % (ref 36.6–50.3)
HGB BLD-MCNC: 14.6 G/DL (ref 12.1–17)
HGB UR QL STRIP: NEGATIVE
HMPV RNA SPEC QL NAA+PROBE: NOT DETECTED
HPIV1 RNA SPEC QL NAA+PROBE: NOT DETECTED
HPIV2 RNA SPEC QL NAA+PROBE: NOT DETECTED
HPIV3 RNA SPEC QL NAA+PROBE: NOT DETECTED
HPIV4 RNA SPEC QL NAA+PROBE: NOT DETECTED
IMM GRANULOCYTES # BLD AUTO: 0 K/UL (ref 0–0.04)
IMM GRANULOCYTES NFR BLD AUTO: 0 % (ref 0–0.5)
KETONES UR QL STRIP.AUTO: NEGATIVE MG/DL
LACTATE BLD-SCNC: 1.09 MMOL/L (ref 0.4–2)
LACTATE BLD-SCNC: 2.38 MMOL/L (ref 0.4–2)
LEUKOCYTE ESTERASE UR QL STRIP.AUTO: NEGATIVE
LYMPHOCYTES # BLD: 1.3 K/UL (ref 0.8–3.5)
LYMPHOCYTES NFR BLD: 11 % (ref 12–49)
M PNEUMO DNA SPEC QL NAA+PROBE: NOT DETECTED
MCH RBC QN AUTO: 29.3 PG (ref 26–34)
MCHC RBC AUTO-ENTMCNC: 33 G/DL (ref 30–36.5)
MCV RBC AUTO: 88.6 FL (ref 80–99)
MONOCYTES # BLD: 0.9 K/UL (ref 0–1)
MONOCYTES NFR BLD: 8 % (ref 5–13)
MRSA DNA SPEC QL NAA+PROBE: NOT DETECTED
NEUTS SEG # BLD: 9.3 K/UL (ref 1.8–8)
NEUTS SEG NFR BLD: 81 % (ref 32–75)
NITRITE UR QL STRIP.AUTO: NEGATIVE
NRBC # BLD: 0 K/UL (ref 0–0.01)
NRBC BLD-RTO: 0 PER 100 WBC
PCO2 BLD: 62.4 MMHG (ref 35–45)
PERFORMED BY:: ABNORMAL
PERFORMED BY:: NORMAL
PH BLD: 7.34 (ref 7.35–7.45)
PH UR STRIP: 7 (ref 5–8)
PLATELET # BLD AUTO: 202 K/UL (ref 150–400)
PMV BLD AUTO: 9.7 FL (ref 8.9–12.9)
PO2 BLD: 30 MMHG (ref 75–100)
POTASSIUM SERPL-SCNC: 4.6 MMOL/L (ref 3.5–5.1)
PROCALCITONIN SERPL-MCNC: <0.05 NG/ML
PROCALCITONIN SERPL-MCNC: NORMAL NG/ML
PROT SERPL-MCNC: 7.7 G/DL (ref 6.4–8.2)
PROT UR STRIP-MCNC: NEGATIVE MG/DL
RBC # BLD AUTO: 4.99 M/UL (ref 4.1–5.7)
RBC #/AREA URNS HPF: ABNORMAL /HPF (ref 0–5)
RSV RNA SPEC QL NAA+PROBE: DETECTED
RV+EV RNA SPEC QL NAA+PROBE: NOT DETECTED
SAO2 % BLD: 51.5 %
SARS-COV-2 RNA RESP QL NAA+PROBE: NOT DETECTED
SARS-COV-2 RNA RESP QL NAA+PROBE: NOT DETECTED
SERVICE CMNT-IMP: ABNORMAL
SODIUM SERPL-SCNC: 142 MMOL/L (ref 136–145)
SP GR UR REFRACTOMETRY: >1.03 (ref 1–1.03)
SPECIMEN TYPE: ABNORMAL
TROPONIN I SERPL HS-MCNC: 6 NG/L (ref 0–76)
URINE CULTURE IF INDICATED: ABNORMAL
UROBILINOGEN UR QL STRIP.AUTO: 0.1 EU/DL (ref 0.1–1)
WBC # BLD AUTO: 11.5 K/UL (ref 4.1–11.1)
WBC URNS QL MICRO: ABNORMAL /HPF (ref 0–4)

## 2024-12-13 PROCEDURE — 99285 EMERGENCY DEPT VISIT HI MDM: CPT

## 2024-12-13 PROCEDURE — 2580000003 HC RX 258: Performed by: STUDENT IN AN ORGANIZED HEALTH CARE EDUCATION/TRAINING PROGRAM

## 2024-12-13 PROCEDURE — 94761 N-INVAS EAR/PLS OXIMETRY MLT: CPT

## 2024-12-13 PROCEDURE — 87641 MR-STAPH DNA AMP PROBE: CPT

## 2024-12-13 PROCEDURE — 6360000002 HC RX W HCPCS: Performed by: STUDENT IN AN ORGANIZED HEALTH CARE EDUCATION/TRAINING PROGRAM

## 2024-12-13 PROCEDURE — 87147 CULTURE TYPE IMMUNOLOGIC: CPT

## 2024-12-13 PROCEDURE — 99291 CRITICAL CARE FIRST HOUR: CPT

## 2024-12-13 PROCEDURE — 96365 THER/PROPH/DIAG IV INF INIT: CPT

## 2024-12-13 PROCEDURE — 94010 BREATHING CAPACITY TEST: CPT

## 2024-12-13 PROCEDURE — 87070 CULTURE OTHR SPECIMN AEROBIC: CPT

## 2024-12-13 PROCEDURE — 87636 SARSCOV2 & INF A&B AMP PRB: CPT

## 2024-12-13 PROCEDURE — 71275 CT ANGIOGRAPHY CHEST: CPT

## 2024-12-13 PROCEDURE — 2700000000 HC OXYGEN THERAPY PER DAY

## 2024-12-13 PROCEDURE — 87205 SMEAR GRAM STAIN: CPT

## 2024-12-13 PROCEDURE — 84484 ASSAY OF TROPONIN QUANT: CPT

## 2024-12-13 PROCEDURE — 94660 CPAP INITIATION&MGMT: CPT

## 2024-12-13 PROCEDURE — 5A09357 ASSISTANCE WITH RESPIRATORY VENTILATION, LESS THAN 24 CONSECUTIVE HOURS, CONTINUOUS POSITIVE AIRWAY PRESSURE: ICD-10-PCS

## 2024-12-13 PROCEDURE — 84443 ASSAY THYROID STIM HORMONE: CPT

## 2024-12-13 PROCEDURE — 96366 THER/PROPH/DIAG IV INF ADDON: CPT

## 2024-12-13 PROCEDURE — 96374 THER/PROPH/DIAG INJ IV PUSH: CPT

## 2024-12-13 PROCEDURE — 6370000000 HC RX 637 (ALT 250 FOR IP): Performed by: STUDENT IN AN ORGANIZED HEALTH CARE EDUCATION/TRAINING PROGRAM

## 2024-12-13 PROCEDURE — 82803 BLOOD GASES ANY COMBINATION: CPT

## 2024-12-13 PROCEDURE — 94640 AIRWAY INHALATION TREATMENT: CPT

## 2024-12-13 PROCEDURE — 80053 COMPREHEN METABOLIC PANEL: CPT

## 2024-12-13 PROCEDURE — 85025 COMPLETE CBC W/AUTO DIFF WBC: CPT

## 2024-12-13 PROCEDURE — 96375 TX/PRO/DX INJ NEW DRUG ADDON: CPT

## 2024-12-13 PROCEDURE — 2500000003 HC RX 250 WO HCPCS: Performed by: STUDENT IN AN ORGANIZED HEALTH CARE EDUCATION/TRAINING PROGRAM

## 2024-12-13 PROCEDURE — 0202U NFCT DS 22 TRGT SARS-COV-2: CPT

## 2024-12-13 PROCEDURE — 84145 PROCALCITONIN (PCT): CPT

## 2024-12-13 PROCEDURE — 2000000000 HC ICU R&B

## 2024-12-13 PROCEDURE — 6360000002 HC RX W HCPCS

## 2024-12-13 PROCEDURE — 71045 X-RAY EXAM CHEST 1 VIEW: CPT

## 2024-12-13 PROCEDURE — 83605 ASSAY OF LACTIC ACID: CPT

## 2024-12-13 PROCEDURE — 93005 ELECTROCARDIOGRAM TRACING: CPT | Performed by: STUDENT IN AN ORGANIZED HEALTH CARE EDUCATION/TRAINING PROGRAM

## 2024-12-13 PROCEDURE — 81001 URINALYSIS AUTO W/SCOPE: CPT

## 2024-12-13 PROCEDURE — 87040 BLOOD CULTURE FOR BACTERIA: CPT

## 2024-12-13 PROCEDURE — 84439 ASSAY OF FREE THYROXINE: CPT

## 2024-12-13 PROCEDURE — 6360000004 HC RX CONTRAST MEDICATION: Performed by: STUDENT IN AN ORGANIZED HEALTH CARE EDUCATION/TRAINING PROGRAM

## 2024-12-13 PROCEDURE — 92610 EVALUATE SWALLOWING FUNCTION: CPT

## 2024-12-13 RX ORDER — ALBUTEROL SULFATE 2.5 MG/.5ML
2.5 SOLUTION RESPIRATORY (INHALATION) EVERY 8 HOURS
Status: DISCONTINUED | OUTPATIENT
Start: 2024-12-13 | End: 2024-12-13

## 2024-12-13 RX ORDER — SODIUM CHLORIDE 9 MG/ML
INJECTION, SOLUTION INTRAVENOUS PRN
Status: DISCONTINUED | OUTPATIENT
Start: 2024-12-13 | End: 2024-12-16 | Stop reason: HOSPADM

## 2024-12-13 RX ORDER — ENOXAPARIN SODIUM 100 MG/ML
40 INJECTION SUBCUTANEOUS DAILY
Status: DISCONTINUED | OUTPATIENT
Start: 2024-12-13 | End: 2024-12-16 | Stop reason: HOSPADM

## 2024-12-13 RX ORDER — SODIUM CHLORIDE 0.9 % (FLUSH) 0.9 %
5-40 SYRINGE (ML) INJECTION EVERY 12 HOURS SCHEDULED
Status: DISCONTINUED | OUTPATIENT
Start: 2024-12-13 | End: 2024-12-16 | Stop reason: HOSPADM

## 2024-12-13 RX ORDER — IPRATROPIUM BROMIDE AND ALBUTEROL SULFATE 2.5; .5 MG/3ML; MG/3ML
1 SOLUTION RESPIRATORY (INHALATION) EVERY 6 HOURS
Status: DISCONTINUED | OUTPATIENT
Start: 2024-12-13 | End: 2024-12-13

## 2024-12-13 RX ORDER — ONDANSETRON 4 MG/1
4 TABLET, ORALLY DISINTEGRATING ORAL EVERY 8 HOURS PRN
Status: DISCONTINUED | OUTPATIENT
Start: 2024-12-13 | End: 2024-12-16 | Stop reason: HOSPADM

## 2024-12-13 RX ORDER — LORAZEPAM 2 MG/ML
INJECTION INTRAMUSCULAR
Status: COMPLETED
Start: 2024-12-13 | End: 2024-12-13

## 2024-12-13 RX ORDER — ACETAMINOPHEN 325 MG/1
650 TABLET ORAL EVERY 6 HOURS PRN
Status: DISCONTINUED | OUTPATIENT
Start: 2024-12-13 | End: 2024-12-16 | Stop reason: HOSPADM

## 2024-12-13 RX ORDER — ACETAMINOPHEN 650 MG/1
650 SUPPOSITORY RECTAL EVERY 6 HOURS PRN
Status: DISCONTINUED | OUTPATIENT
Start: 2024-12-13 | End: 2024-12-16 | Stop reason: HOSPADM

## 2024-12-13 RX ORDER — MAGNESIUM SULFATE IN WATER 40 MG/ML
2000 INJECTION, SOLUTION INTRAVENOUS PRN
Status: DISCONTINUED | OUTPATIENT
Start: 2024-12-13 | End: 2024-12-16 | Stop reason: HOSPADM

## 2024-12-13 RX ORDER — ACETYLCYSTEINE 200 MG/ML
600 SOLUTION ORAL; RESPIRATORY (INHALATION)
Status: DISCONTINUED | OUTPATIENT
Start: 2024-12-13 | End: 2024-12-16 | Stop reason: HOSPADM

## 2024-12-13 RX ORDER — BUDESONIDE 0.5 MG/2ML
0.5 INHALANT ORAL
Status: DISCONTINUED | OUTPATIENT
Start: 2024-12-13 | End: 2024-12-16 | Stop reason: HOSPADM

## 2024-12-13 RX ORDER — IPRATROPIUM BROMIDE AND ALBUTEROL SULFATE 2.5; .5 MG/3ML; MG/3ML
1 SOLUTION RESPIRATORY (INHALATION)
Status: DISCONTINUED | OUTPATIENT
Start: 2024-12-13 | End: 2024-12-16 | Stop reason: HOSPADM

## 2024-12-13 RX ORDER — SODIUM CHLORIDE 0.9 % (FLUSH) 0.9 %
5-40 SYRINGE (ML) INJECTION PRN
Status: DISCONTINUED | OUTPATIENT
Start: 2024-12-13 | End: 2024-12-16 | Stop reason: HOSPADM

## 2024-12-13 RX ORDER — DEXAMETHASONE SODIUM PHOSPHATE 10 MG/ML
6 INJECTION, SOLUTION INTRAMUSCULAR; INTRAVENOUS DAILY
Status: DISCONTINUED | OUTPATIENT
Start: 2024-12-13 | End: 2024-12-16

## 2024-12-13 RX ORDER — POTASSIUM CHLORIDE 7.45 MG/ML
10 INJECTION INTRAVENOUS PRN
Status: DISCONTINUED | OUTPATIENT
Start: 2024-12-13 | End: 2024-12-16 | Stop reason: HOSPADM

## 2024-12-13 RX ORDER — BISACODYL 5 MG/1
5 TABLET, DELAYED RELEASE ORAL DAILY PRN
Status: DISCONTINUED | OUTPATIENT
Start: 2024-12-13 | End: 2024-12-16 | Stop reason: HOSPADM

## 2024-12-13 RX ORDER — IPRATROPIUM BROMIDE AND ALBUTEROL SULFATE 2.5; .5 MG/3ML; MG/3ML
3 SOLUTION RESPIRATORY (INHALATION)
Status: COMPLETED | OUTPATIENT
Start: 2024-12-13 | End: 2024-12-13

## 2024-12-13 RX ORDER — METHYLPREDNISOLONE SODIUM SUCCINATE 125 MG/2ML
INJECTION INTRAMUSCULAR; INTRAVENOUS
Status: COMPLETED
Start: 2024-12-13 | End: 2024-12-13

## 2024-12-13 RX ORDER — LORAZEPAM 2 MG/ML
1 INJECTION INTRAMUSCULAR EVERY 6 HOURS PRN
Status: DISCONTINUED | OUTPATIENT
Start: 2024-12-13 | End: 2024-12-16 | Stop reason: HOSPADM

## 2024-12-13 RX ORDER — POTASSIUM CHLORIDE 29.8 MG/ML
20 INJECTION INTRAVENOUS PRN
Status: DISCONTINUED | OUTPATIENT
Start: 2024-12-13 | End: 2024-12-16 | Stop reason: HOSPADM

## 2024-12-13 RX ORDER — AMLODIPINE BESYLATE 5 MG/1
10 TABLET ORAL
Status: DISCONTINUED | OUTPATIENT
Start: 2024-12-13 | End: 2024-12-16 | Stop reason: HOSPADM

## 2024-12-13 RX ORDER — PREDNISONE 20 MG/1
40 TABLET ORAL DAILY
Status: DISCONTINUED | OUTPATIENT
Start: 2024-12-13 | End: 2024-12-16

## 2024-12-13 RX ORDER — ALBUTEROL SULFATE 2.5 MG/.5ML
2.5 SOLUTION RESPIRATORY (INHALATION)
Status: DISCONTINUED | OUTPATIENT
Start: 2024-12-13 | End: 2024-12-16 | Stop reason: HOSPADM

## 2024-12-13 RX ORDER — ACETYLCYSTEINE 200 MG/ML
600 SOLUTION ORAL; RESPIRATORY (INHALATION) EVERY 8 HOURS
Status: DISCONTINUED | OUTPATIENT
Start: 2024-12-13 | End: 2024-12-13

## 2024-12-13 RX ORDER — POLYETHYLENE GLYCOL 3350 17 G/17G
17 POWDER, FOR SOLUTION ORAL DAILY
Status: DISCONTINUED | OUTPATIENT
Start: 2024-12-13 | End: 2024-12-16 | Stop reason: HOSPADM

## 2024-12-13 RX ORDER — ONDANSETRON 2 MG/ML
4 INJECTION INTRAMUSCULAR; INTRAVENOUS EVERY 6 HOURS PRN
Status: DISCONTINUED | OUTPATIENT
Start: 2024-12-13 | End: 2024-12-16 | Stop reason: HOSPADM

## 2024-12-13 RX ORDER — IOPAMIDOL 755 MG/ML
100 INJECTION, SOLUTION INTRAVASCULAR
Status: COMPLETED | OUTPATIENT
Start: 2024-12-13 | End: 2024-12-13

## 2024-12-13 RX ORDER — IPRATROPIUM BROMIDE AND ALBUTEROL SULFATE 2.5; .5 MG/3ML; MG/3ML
1 SOLUTION RESPIRATORY (INHALATION) EVERY 4 HOURS PRN
Status: DISCONTINUED | OUTPATIENT
Start: 2024-12-13 | End: 2024-12-16 | Stop reason: HOSPADM

## 2024-12-13 RX ADMIN — ACETYLCYSTEINE 600 MG: 200 SOLUTION ORAL; RESPIRATORY (INHALATION) at 20:47

## 2024-12-13 RX ADMIN — IPRATROPIUM BROMIDE AND ALBUTEROL SULFATE 1 DOSE: 2.5; .5 SOLUTION RESPIRATORY (INHALATION) at 20:47

## 2024-12-13 RX ADMIN — SODIUM CHLORIDE, PRESERVATIVE FREE 10 ML: 5 INJECTION INTRAVENOUS at 21:29

## 2024-12-13 RX ADMIN — WATER 1000 MG: 1 INJECTION INTRAMUSCULAR; INTRAVENOUS; SUBCUTANEOUS at 11:59

## 2024-12-13 RX ADMIN — ALBUTEROL SULFATE 2.5 MG: 2.5 SOLUTION RESPIRATORY (INHALATION) at 09:57

## 2024-12-13 RX ADMIN — SODIUM CHLORIDE, PRESERVATIVE FREE 10 ML: 5 INJECTION INTRAVENOUS at 09:32

## 2024-12-13 RX ADMIN — LORAZEPAM 1 MG: 2 INJECTION INTRAMUSCULAR at 03:41

## 2024-12-13 RX ADMIN — IPRATROPIUM BROMIDE AND ALBUTEROL SULFATE 1 DOSE: 2.5; .5 SOLUTION RESPIRATORY (INHALATION) at 14:06

## 2024-12-13 RX ADMIN — AMLODIPINE BESYLATE 10 MG: 5 TABLET ORAL at 21:29

## 2024-12-13 RX ADMIN — CEFTRIAXONE SODIUM 1000 MG: 1 INJECTION, POWDER, FOR SOLUTION INTRAMUSCULAR; INTRAVENOUS at 03:34

## 2024-12-13 RX ADMIN — BUDESONIDE 500 MCG: 0.5 SUSPENSION RESPIRATORY (INHALATION) at 09:58

## 2024-12-13 RX ADMIN — METHYLPREDNISOLONE SODIUM SUCCINATE 125 MG: 125 INJECTION INTRAMUSCULAR; INTRAVENOUS at 03:34

## 2024-12-13 RX ADMIN — LORAZEPAM 1 MG: 2 INJECTION INTRAMUSCULAR; INTRAVENOUS at 03:41

## 2024-12-13 RX ADMIN — IPRATROPIUM BROMIDE AND ALBUTEROL SULFATE 3 DOSE: 2.5; .5 SOLUTION RESPIRATORY (INHALATION) at 03:44

## 2024-12-13 RX ADMIN — ACETYLCYSTEINE 600 MG: 200 SOLUTION ORAL; RESPIRATORY (INHALATION) at 09:57

## 2024-12-13 RX ADMIN — IOPAMIDOL 100 ML: 755 INJECTION, SOLUTION INTRAVENOUS at 05:35

## 2024-12-13 RX ADMIN — METHYLPREDNISOLONE 125 MG: 125 INJECTION, POWDER, LYOPHILIZED, FOR SOLUTION INTRAMUSCULAR; INTRAVENOUS at 03:41

## 2024-12-13 RX ADMIN — DEXAMETHASONE SODIUM PHOSPHATE 6 MG: 10 INJECTION INTRAMUSCULAR; INTRAVENOUS at 09:31

## 2024-12-13 RX ADMIN — BUDESONIDE 500 MCG: 0.5 SUSPENSION RESPIRATORY (INHALATION) at 20:47

## 2024-12-13 RX ADMIN — AZITHROMYCIN MONOHYDRATE 500 MG: 500 INJECTION, POWDER, LYOPHILIZED, FOR SOLUTION INTRAVENOUS at 09:37

## 2024-12-13 RX ADMIN — ACETYLCYSTEINE 600 MG: 200 SOLUTION ORAL; RESPIRATORY (INHALATION) at 14:06

## 2024-12-13 RX ADMIN — ENOXAPARIN SODIUM 40 MG: 100 INJECTION SUBCUTANEOUS at 09:29

## 2024-12-13 RX ADMIN — POLYETHYLENE GLYCOL 3350 17 G: 17 POWDER, FOR SOLUTION ORAL at 09:43

## 2024-12-13 RX ADMIN — DOXYCYCLINE 100 MG: 100 INJECTION, POWDER, LYOPHILIZED, FOR SOLUTION INTRAVENOUS at 03:40

## 2024-12-13 ASSESSMENT — COPD QUESTIONNAIRES
QUESTION8_ENERGYLEVEL: 4
TOTAL_EXACERBATIONS_PASTYEAR: 3
QUESTION6_LEAVINGHOUSE: 3
QUESTION4_WALKINCLINE: 5
QUESTION5_HOMEACTIVITIES: 3
QUESTION2_CHESTPHLEGM: 2
CAT_TOTALSCORE: 26
QUESTION1_COUGHFREQUENCY: 3
QUESTION3_CHESTTIGHTNESS: 3
QUESTION7_SLEEPQUALITY: 3

## 2024-12-13 NOTE — CARE COORDINATION
12/13/24 0959   Service Assessment   Patient Orientation Alert and Oriented   Cognition Alert   History Provided By Patient   Primary Caregiver Self   Support Systems Spouse/Significant Other;Family Members   Patient's Healthcare Decision Maker is: Legal Next of Kin   PCP Verified by CM Yes   Last Visit to PCP Within last 3 months   Prior Functional Level Independent in ADLs/IADLs   Current Functional Level Independent in ADLs/IADLs   Can patient return to prior living arrangement Yes   Ability to make needs known: Good   Family able to assist with home care needs: Yes   Would you like for me to discuss the discharge plan with any other family members/significant others, and if so, who? Yes  (Spouse)     Advance Care Planning     General Advance Care Planning (ACP) Conversation    Date of Conversation: 12/13/2024  Conducted with: Patient with Decision Making Capacity  Other persons present: None    Healthcare Decision Maker:   Primary Decision Maker: Ayana Moran - Spouse - [phone]     Today we documented Decision Maker(s) consistent with Legal Next of Kin hierarchy.    Length of Voluntary ACP Conversation in minutes:  <16 minutes (Non-Billable)    AVANI Uribe      CM met w/ pt at bedside to discuss dc planning. Pt lives at home w/ spouse, charted above. Pt reports indep in ADLs, no dme/hh/rehab hx. Charted PCP is correct, uses [facility] [address]. Pt has [address] listed as address, declined to give physical address. CM team to follow for dispo.

## 2024-12-13 NOTE — ED NOTES
VIDEO VISIT PROGRESS NOTE      CHIEF COMPLAINT  Eye Redness      SUBJECTIVE  The patient is a 1 year old female who is requesting a Video Visit (V-Visit) for evaluation of right upper eyelid swelling with erythema which began on Thursday of this week.  Mother accompanies patient on visit, notes patient has had no fever, no reduced appetite or PO intake, no GI complaints or rash.  Sclera of eye has not been reddened.  No drainage to note from eye.  Pain localized to eyelid site, not to eye itself.  No visual acuity changes.      MEDICATIONS  The medication list in the medical record as well as updates to the medication list submitted by the patient with this V-Visit were reviewed and updated today.      HISTORIES  I have personally reviewed and updated the following electronic medical record sections: Current medications, Allergies, Problem list, Past Medical History and Past Surgical History    REVIEW OF SYSTEMS  Eye Problem(s):eye pain  ENT Problem(s):negative  Cardiovascular problem(s):negative  Respiratory problem(s):negative  Gastro-intestinal problem(s):negative GI  Genito-urinary problem(s):negative  Musculoskeletal problem(s):negative  Integumentary problem(s):negative  Neurological problem(s):negative  Psychiatric problem(s):negative  Endocrine problem(s):negative  Hematologic and/or Lymphatic problem(s):negative      PHYSICAL EXAM  She is alert, nontoxic with fluent speech  Erythema with edema of right upper eyelid  No comedone noted  No palpable mass per mother's palpation  Sclera without injection, no drainage noted    ASSESSMENT/PLAN  Hordeolum externum of right upper eyelid  - erythromycin (ILOTYCIN) ophthalmic ointment; 0.25 inch strip to affected eye(s) 4 times daily for 5 days.  Dispense: 3.5 g; Refill: 0    Conservative treatment attempted with warm compress to no avail.  Will utilize antibiotic ointment in treatment.  Continue conservative treatment and good handwashing/cross contamination  Assumed care of patient.    measures.  Reviewed with mother when to seek face to face care, verbalized understanding.  No questions by end of visit.    FOLLOW UP  Return if symptoms worsen or fail to improve.    CONSENT   This visit is being performed via video to discuss Eye Redness    Clinician Location: [facility]its    Neel is in [place] and her identity has been established.     11-20 minutes were spent in this encounter.     HENRY Osborn  06/27/20  8:59 AM

## 2024-12-13 NOTE — ED PROVIDER NOTES
EMERGENCY DEPARTMENT HISTORY AND PHYSICAL EXAM      Date: 12/13/2024  Patient Name: Prabhakar Moran  MRN: [id number]  YOB: 1955  Date of evaluation: 12/13/2024  Provider: Doc Leonard MD     History of Present Illness     Chief Complaint   Patient presents with    Chest Pain    Shortness of Breath       History Provided By: Patient    HPI: Prabhakar Moran, 69 y.o. male with past medical history as listed and reviewed below presenting to the ED for evaluation of shortness of breath.  Patient was recently seen in the ED for similar symptoms had a negative workup and was discharged in stable condition.  Patient reports his cough and congestion and other symptoms got worse tonight.  He notes some central chest pain as well.  He denies any fluid accumulation at this point.  He denies any recent fever or chills.  Denies any recent travel.    Medical History     Past Medical History:  Past Medical History:   Diagnosis Date    Asthma     Benign essential hypertension     Bronchiectasis without complication (HCC)     Elevated fasting blood sugar     Eosinophilia, unspecified type     GERD (gastroesophageal reflux disease)     Hypercholesterolemia     Hypertension     Mild anemia     Multinodular goiter     Obesity (BMI 30.0-34.9)     Thrombocytopenia (HCC)     Thrombocytopenia (HCC)     Thrombocytopenia (HCC) 01/14/2023    Thyroid nodule     Vitamin D deficiency        Past Surgical History:  Past Surgical History:   Procedure Laterality Date    IR FNA WITH IMAGING  11/2/2022       Family History:  Family History   Problem Relation Age of Onset    High Blood Pressure Father     Hypertension Father        Social History:  Social History     Tobacco Use    Smoking status: Never    Smokeless tobacco: Never   Vaping Use    Vaping status: Never Used   Substance Use Topics    Alcohol use: Never    Drug use: Never       Allergies:  No Known Allergies    PCP: Houston Stevens MD    Current Medications:   Current

## 2024-12-13 NOTE — H&P
nasal spray,suspension 8/7/24   Houston Stevens MD       Allergies/Social/Family History:     No Known Allergies   Social History     Tobacco Use    Smoking status: Never    Smokeless tobacco: Never   Substance Use Topics    Alcohol use: Never      Family History   Problem Relation Age of Onset    High Blood Pressure Father     Hypertension Father          OBJECTIVE:     Labs and Data: Reviewed 12/13/24  Medications: Reviewed 12/13/24  Imaging: Reviewed 12/13/24    Physical Exam  Vitals reviewed.   Constitutional:       Appearance: He is normal weight. He is ill-appearing. He is not diaphoretic.   HENT:      Head: Normocephalic and atraumatic.      Nose: Nose normal.      Mouth/Throat:      Mouth: Mucous membranes are moist.   Eyes:      Extraocular Movements: Extraocular movements intact.      Pupils: Pupils are equal, round, and reactive to light.   Cardiovascular:      Rate and Rhythm: Normal rate and regular rhythm.      Pulses: Normal pulses.      Heart sounds: Normal heart sounds. No murmur heard.  Pulmonary:      Effort: Respiratory distress present.      Breath sounds: Wheezing present.      Comments: NIV vs NC  Abdominal:      General: Bowel sounds are normal. There is no distension.      Palpations: Abdomen is soft.      Tenderness: There is no abdominal tenderness. There is no guarding or rebound.   Musculoskeletal:         General: No swelling or deformity. Normal range of motion.      Cervical back: Normal range of motion. No rigidity.   Skin:     General: Skin is warm and dry.      Capillary Refill: Capillary refill takes less than 2 seconds.   Neurological:      General: No focal deficit present.      Mental Status: He is alert and oriented to person, place, and time. Mental status is at baseline.   Psychiatric:         Mood and Affect: Mood normal.         Behavior: Behavior normal.           /88   Pulse 82   Temp 97.8 °F (36.6 °C) (Oral)   Resp 24   Ht 1.778 m (5' 10\")   Wt 90.7 kg (200

## 2024-12-13 NOTE — ED NOTES
Pt transported to [facility] with 2 ICU RN's and respiratory at this time.

## 2024-12-14 ENCOUNTER — APPOINTMENT (OUTPATIENT)
Facility: HOSPITAL | Age: 69
DRG: 189 | End: 2024-12-14
Payer: MEDICARE

## 2024-12-14 LAB
ALBUMIN SERPL-MCNC: 3.2 G/DL (ref 3.5–5)
ALBUMIN/GLOB SERPL: 0.8 (ref 1.1–2.2)
ALP SERPL-CCNC: 106 U/L (ref 45–117)
ALT SERPL-CCNC: 22 U/L (ref 12–78)
ANION GAP SERPL CALC-SCNC: 5 MMOL/L (ref 2–12)
AST SERPL W P-5'-P-CCNC: 15 U/L (ref 15–37)
BASOPHILS # BLD: 0 K/UL (ref 0–0.1)
BASOPHILS NFR BLD: 0 % (ref 0–1)
BILIRUB SERPL-MCNC: 0.3 MG/DL (ref 0.2–1)
BUN SERPL-MCNC: 18 MG/DL (ref 6–20)
BUN/CREAT SERPL: 26 (ref 12–20)
CA-I BLD-MCNC: 9 MG/DL (ref 8.5–10.1)
CHLORIDE SERPL-SCNC: 106 MMOL/L (ref 97–108)
CO2 SERPL-SCNC: 31 MMOL/L (ref 21–32)
CREAT SERPL-MCNC: 0.7 MG/DL (ref 0.7–1.3)
DIFFERENTIAL METHOD BLD: ABNORMAL
EOSINOPHIL # BLD: 0 K/UL (ref 0–0.4)
EOSINOPHIL NFR BLD: 0 % (ref 0–7)
ERYTHROCYTE [DISTWIDTH] IN BLOOD BY AUTOMATED COUNT: 12.5 % (ref 11.5–14.5)
GLOBULIN SER CALC-MCNC: 3.9 G/DL (ref 2–4)
GLUCOSE SERPL-MCNC: 105 MG/DL (ref 65–100)
HCT VFR BLD AUTO: 42.2 % (ref 36.6–50.3)
HGB BLD-MCNC: 13.7 G/DL (ref 12.1–17)
IMM GRANULOCYTES # BLD AUTO: 0 K/UL (ref 0–0.04)
IMM GRANULOCYTES NFR BLD AUTO: 0 % (ref 0–0.5)
LYMPHOCYTES # BLD: 1.1 K/UL (ref 0.8–3.5)
LYMPHOCYTES NFR BLD: 12 % (ref 12–49)
MAGNESIUM SERPL-MCNC: 2.2 MG/DL (ref 1.6–2.4)
MCH RBC QN AUTO: 29 PG (ref 26–34)
MCHC RBC AUTO-ENTMCNC: 32.5 G/DL (ref 30–36.5)
MCV RBC AUTO: 89.2 FL (ref 80–99)
MONOCYTES # BLD: 1 K/UL (ref 0–1)
MONOCYTES NFR BLD: 11 % (ref 5–13)
NEUTS SEG # BLD: 7.4 K/UL (ref 1.8–8)
NEUTS SEG NFR BLD: 77 % (ref 32–75)
NRBC # BLD: 0 K/UL (ref 0–0.01)
NRBC BLD-RTO: 0 PER 100 WBC
PHOSPHATE SERPL-MCNC: 2.9 MG/DL (ref 2.6–4.7)
PLATELET # BLD AUTO: 179 K/UL (ref 150–400)
PMV BLD AUTO: 9.7 FL (ref 8.9–12.9)
POTASSIUM SERPL-SCNC: 3.7 MMOL/L (ref 3.5–5.1)
PROT SERPL-MCNC: 7.1 G/DL (ref 6.4–8.2)
RBC # BLD AUTO: 4.73 M/UL (ref 4.1–5.7)
SODIUM SERPL-SCNC: 142 MMOL/L (ref 136–145)
T4 FREE SERPL-MCNC: 1.3 NG/DL (ref 0.8–1.5)
TSH SERPL DL<=0.05 MIU/L-ACNC: 0.19 UIU/ML (ref 0.36–3.74)
WBC # BLD AUTO: 9.6 K/UL (ref 4.1–11.1)

## 2024-12-14 PROCEDURE — 94761 N-INVAS EAR/PLS OXIMETRY MLT: CPT

## 2024-12-14 PROCEDURE — 94640 AIRWAY INHALATION TREATMENT: CPT

## 2024-12-14 PROCEDURE — 36415 COLL VENOUS BLD VENIPUNCTURE: CPT

## 2024-12-14 PROCEDURE — 6370000000 HC RX 637 (ALT 250 FOR IP): Performed by: STUDENT IN AN ORGANIZED HEALTH CARE EDUCATION/TRAINING PROGRAM

## 2024-12-14 PROCEDURE — 80053 COMPREHEN METABOLIC PANEL: CPT

## 2024-12-14 PROCEDURE — 6360000002 HC RX W HCPCS: Performed by: STUDENT IN AN ORGANIZED HEALTH CARE EDUCATION/TRAINING PROGRAM

## 2024-12-14 PROCEDURE — 71045 X-RAY EXAM CHEST 1 VIEW: CPT

## 2024-12-14 PROCEDURE — 83735 ASSAY OF MAGNESIUM: CPT

## 2024-12-14 PROCEDURE — 2700000000 HC OXYGEN THERAPY PER DAY

## 2024-12-14 PROCEDURE — 84100 ASSAY OF PHOSPHORUS: CPT

## 2024-12-14 PROCEDURE — 2580000003 HC RX 258: Performed by: STUDENT IN AN ORGANIZED HEALTH CARE EDUCATION/TRAINING PROGRAM

## 2024-12-14 PROCEDURE — 85025 COMPLETE CBC W/AUTO DIFF WBC: CPT

## 2024-12-14 PROCEDURE — 92526 ORAL FUNCTION THERAPY: CPT

## 2024-12-14 PROCEDURE — 1100000000 HC RM PRIVATE

## 2024-12-14 RX ADMIN — IPRATROPIUM BROMIDE AND ALBUTEROL SULFATE 1 DOSE: 2.5; .5 SOLUTION RESPIRATORY (INHALATION) at 07:20

## 2024-12-14 RX ADMIN — SODIUM CHLORIDE, PRESERVATIVE FREE 10 ML: 5 INJECTION INTRAVENOUS at 20:27

## 2024-12-14 RX ADMIN — ACETYLCYSTEINE 600 MG: 200 SOLUTION ORAL; RESPIRATORY (INHALATION) at 13:59

## 2024-12-14 RX ADMIN — AMLODIPINE BESYLATE 10 MG: 5 TABLET ORAL at 20:27

## 2024-12-14 RX ADMIN — BUDESONIDE 500 MCG: 0.5 SUSPENSION RESPIRATORY (INHALATION) at 07:19

## 2024-12-14 RX ADMIN — BUDESONIDE 500 MCG: 0.5 SUSPENSION RESPIRATORY (INHALATION) at 20:49

## 2024-12-14 RX ADMIN — ACETYLCYSTEINE 600 MG: 200 SOLUTION ORAL; RESPIRATORY (INHALATION) at 07:20

## 2024-12-14 RX ADMIN — POLYETHYLENE GLYCOL 3350 17 G: 17 POWDER, FOR SOLUTION ORAL at 08:45

## 2024-12-14 RX ADMIN — IPRATROPIUM BROMIDE AND ALBUTEROL SULFATE 1 DOSE: 2.5; .5 SOLUTION RESPIRATORY (INHALATION) at 20:49

## 2024-12-14 RX ADMIN — DEXAMETHASONE SODIUM PHOSPHATE 6 MG: 10 INJECTION INTRAMUSCULAR; INTRAVENOUS at 08:37

## 2024-12-14 RX ADMIN — ENOXAPARIN SODIUM 40 MG: 100 INJECTION SUBCUTANEOUS at 08:43

## 2024-12-14 RX ADMIN — IPRATROPIUM BROMIDE AND ALBUTEROL SULFATE 1 DOSE: 2.5; .5 SOLUTION RESPIRATORY (INHALATION) at 13:59

## 2024-12-14 RX ADMIN — ACETYLCYSTEINE 600 MG: 200 SOLUTION ORAL; RESPIRATORY (INHALATION) at 20:49

## 2024-12-14 RX ADMIN — AZITHROMYCIN MONOHYDRATE 500 MG: 500 INJECTION, POWDER, LYOPHILIZED, FOR SOLUTION INTRAVENOUS at 08:36

## 2024-12-14 RX ADMIN — SODIUM CHLORIDE, PRESERVATIVE FREE 10 ML: 5 INJECTION INTRAVENOUS at 08:45

## 2024-12-14 ASSESSMENT — PAIN SCALES - GENERAL
PAINLEVEL_OUTOF10: 0

## 2024-12-15 LAB
ALBUMIN SERPL-MCNC: 2.9 G/DL (ref 3.5–5)
ALBUMIN/GLOB SERPL: 0.9 (ref 1.1–2.2)
ALP SERPL-CCNC: 93 U/L (ref 45–117)
ALT SERPL-CCNC: 21 U/L (ref 12–78)
ANION GAP SERPL CALC-SCNC: 3 MMOL/L (ref 2–12)
AST SERPL W P-5'-P-CCNC: 10 U/L (ref 15–37)
BASOPHILS # BLD: 0 K/UL (ref 0–0.1)
BASOPHILS NFR BLD: 0 % (ref 0–1)
BILIRUB SERPL-MCNC: 0.2 MG/DL (ref 0.2–1)
BUN SERPL-MCNC: 23 MG/DL (ref 6–20)
BUN/CREAT SERPL: 28 (ref 12–20)
CA-I BLD-MCNC: 8.7 MG/DL (ref 8.5–10.1)
CHLORIDE SERPL-SCNC: 108 MMOL/L (ref 97–108)
CO2 SERPL-SCNC: 30 MMOL/L (ref 21–32)
CREAT SERPL-MCNC: 0.82 MG/DL (ref 0.7–1.3)
DIFFERENTIAL METHOD BLD: ABNORMAL
EOSINOPHIL # BLD: 0 K/UL (ref 0–0.4)
EOSINOPHIL NFR BLD: 0 % (ref 0–7)
ERYTHROCYTE [DISTWIDTH] IN BLOOD BY AUTOMATED COUNT: 12.5 % (ref 11.5–14.5)
GLOBULIN SER CALC-MCNC: 3.3 G/DL (ref 2–4)
GLUCOSE BLD STRIP.AUTO-MCNC: 153 MG/DL (ref 65–100)
GLUCOSE BLD STRIP.AUTO-MCNC: 202 MG/DL (ref 65–100)
GLUCOSE SERPL-MCNC: 111 MG/DL (ref 65–100)
HCT VFR BLD AUTO: 39.5 % (ref 36.6–50.3)
HGB BLD-MCNC: 12.9 G/DL (ref 12.1–17)
IMM GRANULOCYTES # BLD AUTO: 0 K/UL (ref 0–0.04)
IMM GRANULOCYTES NFR BLD AUTO: 0 % (ref 0–0.5)
LYMPHOCYTES # BLD: 1.8 K/UL (ref 0.8–3.5)
LYMPHOCYTES NFR BLD: 30 % (ref 12–49)
MAGNESIUM SERPL-MCNC: 2.1 MG/DL (ref 1.6–2.4)
MCH RBC QN AUTO: 28.8 PG (ref 26–34)
MCHC RBC AUTO-ENTMCNC: 32.7 G/DL (ref 30–36.5)
MCV RBC AUTO: 88.2 FL (ref 80–99)
MONOCYTES # BLD: 0.9 K/UL (ref 0–1)
MONOCYTES NFR BLD: 16 % (ref 5–13)
NEUTS SEG # BLD: 3.1 K/UL (ref 1.8–8)
NEUTS SEG NFR BLD: 54 % (ref 32–75)
NRBC # BLD: 0 K/UL (ref 0–0.01)
NRBC BLD-RTO: 0 PER 100 WBC
PERFORMED BY:: ABNORMAL
PERFORMED BY:: ABNORMAL
PHOSPHATE SERPL-MCNC: 3.1 MG/DL (ref 2.6–4.7)
PLATELET # BLD AUTO: 169 K/UL (ref 150–400)
PMV BLD AUTO: 9.8 FL (ref 8.9–12.9)
POTASSIUM SERPL-SCNC: 3.8 MMOL/L (ref 3.5–5.1)
PROT SERPL-MCNC: 6.2 G/DL (ref 6.4–8.2)
RBC # BLD AUTO: 4.48 M/UL (ref 4.1–5.7)
SODIUM SERPL-SCNC: 141 MMOL/L (ref 136–145)
WBC # BLD AUTO: 5.9 K/UL (ref 4.1–11.1)

## 2024-12-15 PROCEDURE — 82962 GLUCOSE BLOOD TEST: CPT

## 2024-12-15 PROCEDURE — 36415 COLL VENOUS BLD VENIPUNCTURE: CPT

## 2024-12-15 PROCEDURE — 94640 AIRWAY INHALATION TREATMENT: CPT

## 2024-12-15 PROCEDURE — 1100000000 HC RM PRIVATE

## 2024-12-15 PROCEDURE — 6370000000 HC RX 637 (ALT 250 FOR IP): Performed by: STUDENT IN AN ORGANIZED HEALTH CARE EDUCATION/TRAINING PROGRAM

## 2024-12-15 PROCEDURE — 6360000002 HC RX W HCPCS: Performed by: STUDENT IN AN ORGANIZED HEALTH CARE EDUCATION/TRAINING PROGRAM

## 2024-12-15 PROCEDURE — 85025 COMPLETE CBC W/AUTO DIFF WBC: CPT

## 2024-12-15 PROCEDURE — 94761 N-INVAS EAR/PLS OXIMETRY MLT: CPT

## 2024-12-15 PROCEDURE — 2580000003 HC RX 258: Performed by: STUDENT IN AN ORGANIZED HEALTH CARE EDUCATION/TRAINING PROGRAM

## 2024-12-15 PROCEDURE — 80053 COMPREHEN METABOLIC PANEL: CPT

## 2024-12-15 PROCEDURE — 83735 ASSAY OF MAGNESIUM: CPT

## 2024-12-15 PROCEDURE — 84100 ASSAY OF PHOSPHORUS: CPT

## 2024-12-15 RX ADMIN — ACETYLCYSTEINE 600 MG: 200 SOLUTION ORAL; RESPIRATORY (INHALATION) at 08:41

## 2024-12-15 RX ADMIN — IPRATROPIUM BROMIDE AND ALBUTEROL SULFATE 1 DOSE: 2.5; .5 SOLUTION RESPIRATORY (INHALATION) at 20:38

## 2024-12-15 RX ADMIN — AZITHROMYCIN MONOHYDRATE 500 MG: 500 INJECTION, POWDER, LYOPHILIZED, FOR SOLUTION INTRAVENOUS at 10:14

## 2024-12-15 RX ADMIN — IPRATROPIUM BROMIDE AND ALBUTEROL SULFATE 1 DOSE: 2.5; .5 SOLUTION RESPIRATORY (INHALATION) at 08:41

## 2024-12-15 RX ADMIN — SODIUM CHLORIDE, PRESERVATIVE FREE 10 ML: 5 INJECTION INTRAVENOUS at 21:06

## 2024-12-15 RX ADMIN — SODIUM CHLORIDE, PRESERVATIVE FREE 10 ML: 5 INJECTION INTRAVENOUS at 10:02

## 2024-12-15 RX ADMIN — DEXAMETHASONE SODIUM PHOSPHATE 6 MG: 10 INJECTION INTRAMUSCULAR; INTRAVENOUS at 10:01

## 2024-12-15 RX ADMIN — BUDESONIDE 500 MCG: 0.5 SUSPENSION RESPIRATORY (INHALATION) at 20:38

## 2024-12-15 RX ADMIN — AMLODIPINE BESYLATE 10 MG: 5 TABLET ORAL at 21:06

## 2024-12-15 RX ADMIN — ENOXAPARIN SODIUM 40 MG: 100 INJECTION SUBCUTANEOUS at 10:01

## 2024-12-15 RX ADMIN — ACETYLCYSTEINE 600 MG: 200 SOLUTION ORAL; RESPIRATORY (INHALATION) at 20:38

## 2024-12-15 RX ADMIN — ACETYLCYSTEINE 600 MG: 200 SOLUTION ORAL; RESPIRATORY (INHALATION) at 14:46

## 2024-12-15 RX ADMIN — BUDESONIDE 500 MCG: 0.5 SUSPENSION RESPIRATORY (INHALATION) at 08:41

## 2024-12-15 RX ADMIN — IPRATROPIUM BROMIDE AND ALBUTEROL SULFATE 1 DOSE: 2.5; .5 SOLUTION RESPIRATORY (INHALATION) at 14:46

## 2024-12-16 ENCOUNTER — APPOINTMENT (OUTPATIENT)
Facility: HOSPITAL | Age: 69
DRG: 189 | End: 2024-12-16
Payer: MEDICARE

## 2024-12-16 VITALS
WEIGHT: 200 LBS | RESPIRATION RATE: 18 BRPM | HEIGHT: 70 IN | DIASTOLIC BLOOD PRESSURE: 79 MMHG | HEART RATE: 87 BPM | TEMPERATURE: 97.7 F | BODY MASS INDEX: 28.63 KG/M2 | OXYGEN SATURATION: 95 % | SYSTOLIC BLOOD PRESSURE: 138 MMHG

## 2024-12-16 LAB
ALBUMIN SERPL-MCNC: 2.9 G/DL (ref 3.5–5)
ALBUMIN/GLOB SERPL: 0.8 (ref 1.1–2.2)
ALP SERPL-CCNC: 94 U/L (ref 45–117)
ALT SERPL-CCNC: 19 U/L (ref 12–78)
ANION GAP SERPL CALC-SCNC: 4 MMOL/L (ref 2–12)
AST SERPL W P-5'-P-CCNC: 10 U/L (ref 15–37)
BACTERIA SPEC CULT: NORMAL
BACTERIA SPEC CULT: NORMAL
BASOPHILS # BLD: 0 K/UL (ref 0–0.1)
BASOPHILS NFR BLD: 0 % (ref 0–1)
BILIRUB SERPL-MCNC: 0.2 MG/DL (ref 0.2–1)
BUN SERPL-MCNC: 23 MG/DL (ref 6–20)
BUN/CREAT SERPL: 27 (ref 12–20)
CA-I BLD-MCNC: 8.9 MG/DL (ref 8.5–10.1)
CHLORIDE SERPL-SCNC: 110 MMOL/L (ref 97–108)
CO2 SERPL-SCNC: 28 MMOL/L (ref 21–32)
CREAT SERPL-MCNC: 0.86 MG/DL (ref 0.7–1.3)
DIFFERENTIAL METHOD BLD: ABNORMAL
EOSINOPHIL # BLD: 0 K/UL (ref 0–0.4)
EOSINOPHIL NFR BLD: 0 % (ref 0–7)
ERYTHROCYTE [DISTWIDTH] IN BLOOD BY AUTOMATED COUNT: 12.2 % (ref 11.5–14.5)
GLOBULIN SER CALC-MCNC: 3.7 G/DL (ref 2–4)
GLUCOSE SERPL-MCNC: 111 MG/DL (ref 65–100)
GRAM STN SPEC: NORMAL
HCT VFR BLD AUTO: 41.8 % (ref 36.6–50.3)
HGB BLD-MCNC: 13.6 G/DL (ref 12.1–17)
IMM GRANULOCYTES # BLD AUTO: 0.1 K/UL (ref 0–0.04)
IMM GRANULOCYTES NFR BLD AUTO: 1 % (ref 0–0.5)
LYMPHOCYTES # BLD: 2.6 K/UL (ref 0.8–3.5)
LYMPHOCYTES NFR BLD: 33 % (ref 12–49)
Lab: NORMAL
MAGNESIUM SERPL-MCNC: 2.1 MG/DL (ref 1.6–2.4)
MCH RBC QN AUTO: 28.5 PG (ref 26–34)
MCHC RBC AUTO-ENTMCNC: 32.5 G/DL (ref 30–36.5)
MCV RBC AUTO: 87.6 FL (ref 80–99)
MONOCYTES # BLD: 0.7 K/UL (ref 0–1)
MONOCYTES NFR BLD: 9 % (ref 5–13)
NEUTS SEG # BLD: 4.6 K/UL (ref 1.8–8)
NEUTS SEG NFR BLD: 57 % (ref 32–75)
NRBC # BLD: 0 K/UL (ref 0–0.01)
NRBC BLD-RTO: 0 PER 100 WBC
PHOSPHATE SERPL-MCNC: 3 MG/DL (ref 2.6–4.7)
PLATELET # BLD AUTO: 201 K/UL (ref 150–400)
PMV BLD AUTO: 9.2 FL (ref 8.9–12.9)
POTASSIUM SERPL-SCNC: 4.3 MMOL/L (ref 3.5–5.1)
PROT SERPL-MCNC: 6.6 G/DL (ref 6.4–8.2)
RBC # BLD AUTO: 4.77 M/UL (ref 4.1–5.7)
SODIUM SERPL-SCNC: 142 MMOL/L (ref 136–145)
WBC # BLD AUTO: 8 K/UL (ref 4.1–11.1)

## 2024-12-16 PROCEDURE — 2700000000 HC OXYGEN THERAPY PER DAY

## 2024-12-16 PROCEDURE — 80053 COMPREHEN METABOLIC PANEL: CPT

## 2024-12-16 PROCEDURE — 97530 THERAPEUTIC ACTIVITIES: CPT

## 2024-12-16 PROCEDURE — 83735 ASSAY OF MAGNESIUM: CPT

## 2024-12-16 PROCEDURE — 2580000003 HC RX 258: Performed by: STUDENT IN AN ORGANIZED HEALTH CARE EDUCATION/TRAINING PROGRAM

## 2024-12-16 PROCEDURE — 2500000003 HC RX 250 WO HCPCS

## 2024-12-16 PROCEDURE — 84100 ASSAY OF PHOSPHORUS: CPT

## 2024-12-16 PROCEDURE — 6360000002 HC RX W HCPCS: Performed by: STUDENT IN AN ORGANIZED HEALTH CARE EDUCATION/TRAINING PROGRAM

## 2024-12-16 PROCEDURE — 85025 COMPLETE CBC W/AUTO DIFF WBC: CPT

## 2024-12-16 PROCEDURE — 6370000000 HC RX 637 (ALT 250 FOR IP): Performed by: STUDENT IN AN ORGANIZED HEALTH CARE EDUCATION/TRAINING PROGRAM

## 2024-12-16 PROCEDURE — 97550 CAREGIVER TRAING 1ST 30 MIN: CPT

## 2024-12-16 PROCEDURE — 74230 X-RAY XM SWLNG FUNCJ C+: CPT

## 2024-12-16 PROCEDURE — 94761 N-INVAS EAR/PLS OXIMETRY MLT: CPT

## 2024-12-16 PROCEDURE — 92526 ORAL FUNCTION THERAPY: CPT

## 2024-12-16 PROCEDURE — 94640 AIRWAY INHALATION TREATMENT: CPT

## 2024-12-16 PROCEDURE — 92611 MOTION FLUOROSCOPY/SWALLOW: CPT

## 2024-12-16 PROCEDURE — 97161 PT EVAL LOW COMPLEX 20 MIN: CPT

## 2024-12-16 PROCEDURE — 36415 COLL VENOUS BLD VENIPUNCTURE: CPT

## 2024-12-16 RX ORDER — AMLODIPINE BESYLATE 10 MG/1
10 TABLET ORAL
Qty: 90 TABLET | Refills: 1 | Status: SHIPPED | OUTPATIENT
Start: 2024-12-16

## 2024-12-16 RX ORDER — PREDNISONE 20 MG/1
20 TABLET ORAL 2 TIMES DAILY
Status: DISCONTINUED | OUTPATIENT
Start: 2024-12-16 | End: 2024-12-16 | Stop reason: HOSPADM

## 2024-12-16 RX ADMIN — BUDESONIDE 500 MCG: 0.5 SUSPENSION RESPIRATORY (INHALATION) at 07:37

## 2024-12-16 RX ADMIN — BARIUM SULFATE 20 ML: 400 PASTE ORAL at 15:01

## 2024-12-16 RX ADMIN — BARIUM SULFATE 20 ML: 400 SUSPENSION ORAL at 15:02

## 2024-12-16 RX ADMIN — BARIUM SULFATE 50 ML: 0.81 POWDER, FOR SUSPENSION ORAL at 15:02

## 2024-12-16 RX ADMIN — IPRATROPIUM BROMIDE AND ALBUTEROL SULFATE 1 DOSE: 2.5; .5 SOLUTION RESPIRATORY (INHALATION) at 07:37

## 2024-12-16 RX ADMIN — ACETYLCYSTEINE 600 MG: 200 SOLUTION ORAL; RESPIRATORY (INHALATION) at 07:37

## 2024-12-16 RX ADMIN — SODIUM CHLORIDE, PRESERVATIVE FREE 10 ML: 5 INJECTION INTRAVENOUS at 08:00

## 2024-12-16 RX ADMIN — PREDNISONE 40 MG: 20 TABLET ORAL at 07:51

## 2024-12-16 RX ADMIN — AZITHROMYCIN MONOHYDRATE 500 MG: 500 INJECTION, POWDER, LYOPHILIZED, FOR SOLUTION INTRAVENOUS at 07:52

## 2024-12-16 RX ADMIN — BARIUM SULFATE 5 ML: 400 SUSPENSION ORAL at 15:02

## 2024-12-16 RX ADMIN — ENOXAPARIN SODIUM 40 MG: 100 INJECTION SUBCUTANEOUS at 07:51

## 2024-12-16 NOTE — DISCHARGE SUMMARY
Phone: [phone]   amLODIPine 10 MG tablet           Follow up Care:    Follow-up Information    None              Diet:  dysphagia diet per speech     Disposition:  Home     Advanced Directive:   FULL    DNR      Discharge Exam:                     Vitals:    12/16/24 0814   BP: (!) 140/84   Pulse: 79   Resp: 20   Temp: 98.5 °F (36.9 °C)   SpO2: 95%      General: In no acute distress  HEENT: NC/AT. No obvious deformities.Nares patent. Oral mucosa moist. +Goiter   Cardiovascular: S1, S2 present. No murmurs noted.   Respiratory: Normal respiratory effort. Breath sounds CTA in all lung fields b/l.  No wheezing.  no retractions noted  Abdomen: BS+. Soft, nontender. No TTP in all quadrants. No guarding or rigidity   Lower Extremity: No edema or obvious ulcers/wounds noted  Neurological: CN III-XII grossly intact. Muscle strength 5/5 in UE and 5/5 in LE b/l. No facial drooping or slurring of words.   Psychiatric: Appropriate mood and affect            Significant Diagnostic Studies:   12/13/2024: BUN 21 mg/dL (H; Ref range: 6 - 20 mg/dL); Calcium 8.9 mg/dL (Ref range: 8.5 - 10.1 mg/dL); Chloride 107 mmol/L (Ref range: 97 - 108 mmol/L); CO2 28 mmol/L (Ref range: 21 - 32 mmol/L); Creatinine 1.01 mg/dL (Ref range: 0.70 - 1.30 mg/dL); Glucose 145 mg/dL (H; Ref range: 65 - 100 mg/dL); Hematocrit 44.2 % (Ref range: 36.6 - 50.3 %); Hemoglobin 14.6 g/dL (Ref range: 12.1 - 17.0 g/dL); Potassium 4.6 mmol/L (Ref range: 3.5 - 5.1 mmol/L); Sodium 142 mmol/L (Ref range: 136 - 145 mmol/L)  12/14/2024: BUN 18 mg/dL (Ref range: 6 - 20 mg/dL); Calcium 9.0 mg/dL (Ref range: 8.5 - 10.1 mg/dL); Chloride 106 mmol/L (Ref range: 97 - 108 mmol/L); CO2 31 mmol/L (Ref range: 21 - 32 mmol/L); Creatinine 0.70 mg/dL (Ref range: 0.70 - 1.30 mg/dL); Glucose 105 mg/dL (H; Ref range: 65 - 100 mg/dL); Hematocrit 42.2 % (Ref range: 36.6 - 50.3 %); Hemoglobin 13.7 g/dL (Ref range: 12.1 - 17.0 g/dL); Potassium 3.7 mmol/L (Ref range: 3.5 - 5.1 mmol/L);

## 2024-12-16 NOTE — PLAN OF CARE
SPEECH PATHOLOGY MODIFIED BARIUM SWALLOW STUDY  Patient: Prabhakar Moran (69 y.o. male)  Date: 12/16/2024  Primary Diagnosis: COPD exacerbation (HCC) [J44.1]       Precautions: aspiration                     DIET RECOMMENDATIONS:Minced and moist and mildly thick liquids    SWALLOW SAFETY PRECAUTIONS: 1:1 assistance with ALL PO intake, STRICT aspiration and GERD precautions, monitor pt closely for s/s aspiration, meds crushed if able in applesauce or pudding, FEED ONLY IF AWAKE AND ALERT.      ASSESSMENT :  Based on the objective data described below, the patient presents with mild-moderate oropharyngeal dysphagia w/ silent aspiration of thin liquids.     Oral phase: Reduced bolus control of thin liquids resulting in premature spillage over BOT to the level of the pyriforms where swallow is initiated. Patient can control bolus w/ verbal cues. Prolonged mastication of regular solids. Oral bolus fragmentation. Reduced tongue base retraction. Patient w/ head/neck extension to initiate swallow of solids.     Pharyngeal phase: reduced hyolaryngeal excursion and protraction w/ excursion > protraction. Reduced epiglottic inversion for airway protection. Weight of consistency does appear to aid in epiglottic inversion. There is flash penetration of tsp thin during the swallow. There is penetration to the vocal cords w/ thin via cup and larger volume before the swallow and aspiration during the swallow of thin and chin tuck maneuver. Patient is cued to elicit cough response and was able to clear majority of aspiration from trachea.   No penetration or aspiration observed w/ remaining consistencies; however remains at risk.   Mild pharyngeal residue present after the swallow in vallecula and pyriforms sinuses. Use of multiple swallows and increased viscosity aids in clearance.     UES: reduce duration and relaxation of this segment. Transient CP bar is observed, but does not appear to impede bolus flow.     Patient will 
Speech LAnguage Pathology Dysphagia EVALUATION    Patient: Prabhakar Moran (69 y.o. male)  Date: 12/13/2024  Primary Diagnosis: COPD exacerbation ([facility]) [J44.1]       Precautions: Aspiration, GERD                    Time In: 1300  Time Out: 1330    DIET RECOMMENDATIONS: Soft and bite sized and thin liquids    SWALLOW SAFETY PRECAUTIONS: Rec slow rate of intake, small bites/sips, 6 small meals, double swallow, liquid wash, remain upright 1 hour after PO and do not eat 3 hours before bedtime.       ASSESSMENT :    Based on the objective data described below, the patient presents with     Grossly functional oropharyngeal swallowing abilities  However, patient and wife report recent h/o dysphagia symptoms  warranting further SLP intervention    Awake with variable alertness. Consistently responsive to stimulation, at times with mild delay. Often closed eyes, appearing to fall asleep between tasks. Ill-appearing.     Some of history obtained from wife d/t patient indication of not feeling well, poor endurance.   Wife reports:   Has dentures (present in hospital). Consuming food w/o dentures at times.   H/o dysphagia requiring thickened per SLP and MBSS >5 years ago;   indicates that repeat MBSS showed improved tolerance, was upgraded to thin liquids;   Symptoms of dysphagia w/solids c/b choking episodes w/difficult to manage regular texture solids (e.g., rice, chewy bread) present for at least 6 months (wife unclear on timeline) prior to admission;   Choking episode w/hamburger following recent ER visit 12/12 for SOB.   Wife eager for patient to participate in repeat MBSS.     Bipap removed by RN.  W/ 6L O2 via NC maintained vitals pre/during/post PO trials:  Heart rate upper 80's   SpO2 %  WOB appeared mildly increased to normal   SOB denied  Cough, congested observed infrequently outside of PO intake    Oral mucosa w/ mild xerostomia, mild dried mucosa on tongue--spat out by patient independently post thin trial. 
Speech LAnguage Pathology Dysphagia TREATMENT    Patient: Prabhakar Moran (69 y.o. male)  Date: 12/14/2024  Primary Diagnosis: COPD exacerbation (HCC) [J44.1]       Precautions: Aspiration     Time In: 1041  Time Out: 1058    DIET RECOMMENDATIONS: Rec diet downgrade to MM5, thin liquids    SWALLOW SAFETY PRECAUTIONS: STRICT aspiration and GERD precautions, monitor pt closely for s/s aspiration, meds crushed if able in applesauce or pudding    ASSESSMENT :  Nsg requested clinician f/u with patient due to chocking incident last night and this AM. Nsg reports patient choked on his sausage this AM. Patient reports sometimes he chokes on his food and other times he is able to tolerate. Patient on room air. Patient's O2 sats remained around 88%-90% throughout tx. At end of tx patient placed NC back in nares. Patient reports he sometimes removes his O2. Clinician discussed the importance of keeping NC in nares unless otherwise instructed by nsg/RT/MD.     Based on the objective data described below, the patient presents with mild oropharyngeal dysphagia. Oral phase c/b appropriate bolus acceptance, slow but complete mastication, and mildly delayed A-P transit. No oral residue. Pharyngeal phase c/b mildly delayed swallow initiation. HLE weak, but functional to palpation. Patient tolerates cup sips thin liquid, puree, MM5, and SBS with no overt s/s of pen/aspiration. He maintained a clear vocal quality following all trials. Clinician discussed various diet consistencies with patient/wife. Due to choking incidents, clinician rec downgrade to MM5. Patient/wife are agreeable with rec. Clinician also discussed MBS may be appropriate to further assess swallow function. Patient/wife verbalized understanding    Patient will benefit from skilled intervention to address the above impairments.    GOALS:  Problem: SLP Adult - Impaired Swallowing  Goal: By Discharge: Advance to least restrictive diet without signs or symptoms of 
         Physical Therapy Evaluation Charge Determination   History Examination Presentation Decision-Making   MEDIUM  Complexity : 1-2 comorbidities / personal factors will impact the outcome/ POC  LOW Complexity : 1-2 Standardized tests and measures addressing body structure, function, activity limitation and / or participation in recreation  LOW Complexity : Stable, uncomplicated  Outcome measure: AMPAC 6: LOW      Based on the above components, the patient evaluation is determined to be of the following complexity level: LOW      Pain Ratin/10     Activity Tolerance:   Good    After treatment patient left in no apparent distress:   Bed locked and in lowest position Patient left in no apparent distress in bed, Call bell within reach, and Caregiver / family present and nsg updated.    COMMUNICATION/EDUCATION:   The patient’s plan of care was discussed with: Registered nurse    Patient Education  Education Given To: Patient  Education Provided: Role of Therapy;Plan of Care;Mobility Training;Energy Conservation  Education Method: Verbal  Barriers to Learning: None  Education Outcome: Verbalized understanding    Thank you for this referral.  Tigre Brooks, PT  Minutes: 24       
Thrombocytopenia (HCC) 01/14/2023    Thyroid nodule     Vitamin D deficiency      Past Surgical History:   Procedure Laterality Date    IR FNA WITH IMAGING  11/2/2022     Prior Level of Function/Home Situation:        Cognitive and Communication Status:  Neurologic State: Alert  Orientation Level: Oriented x4  Cognition: Follows commands    Baseline Assessment:  Baseline diet: PLOF regular/thin  Current oxygen: 1 L O2  Hearing: ?  Patient complaint: Patient reports minced and moist diet is repetitive.     After Treatment:  Patient left in no apparent distress in bed, Call bell left within reach, and Updated patient's board with:  diet recommendations     Pain:  VAS (numerical) 0/10    COMMUNICATION/EDUCATION:   Aspiration precautions, GERD precautions, Diet recommendations, MBS recommendations, Compensatory strategies, and Prognosis and SLP POC education provided to Patient and SO/spouse via explanation and teach back, all questions/concerns addressed. Patient requires reinforcement.    The patient's plan of care including recommendations, planned interventions, and recommended diet changes were discussed with: Physician.    Patient/family agree to work toward stated goals and plan of care    Thank you,  Teresita Rivera M.Ed., CCC-SLP  Caregiver education minutes: 15  Dysphagia treatment Minutes: 32

## 2024-12-16 NOTE — CARE COORDINATION
1341 CM noted patient's walking o2 study and patient does not qualify for home oxygen. Patient is still pending MBS. No needs from CM for discharge.    Transition of Care Plan:    RUR: 10  Prior Level of Functioning: INDEP  Disposition: Home with spouse  KHANG: 12/16/24  If SNF or IPR: Date FOC offered: N/a  Date FOC received: N/a  Accepting facility: N/a  Date authorization started with reference number: N/a  Date authorization received and expires: N/a  Follow up appointments: Per MD  DME needed: N/a  Transportation at discharge: Wife at bedside  IM/IMM Medicare/ letter given: 12/15/24  Is patient a  and connected with [facility]? N/a   If yes, was  transfer form completed and [facility] notified?   Caregiver Contact: Wife at bedside  Discharge Caregiver contacted prior to discharge? N/a  Care Conference needed? No   Barriers to discharge: None      1321 CM noted discharge order.     DCP is home with spouse.    Patient is pending walking oxygen study to see if there is a need for home oxygen.     Patient is also pending MBS.     CM will continue to follow.

## 2024-12-16 NOTE — PROGRESS NOTES
[facility] Progress Note        Prabhakar Moran  1955  [id number]  12/14/2024      Brief HPI / Hospital Course:  Mr. Moran 69-year-old male with PMH BPH, possible COPD, and multinodular goiter, presented for shortness of breath of 2 days.  Patient did initially present to ED for similar symptoms approximately 2 days ago and unfortunately symptoms have persisted since.  Hypoxia in ED improved with NIV.  Patient unable to be de-escalated off BiPAP and subcu admitted to ICU for further evaluation management.     Of note patient does have known multinodular goiter which was worked up with imaging recently and there appears to be enlargement of the right thyroid lobe and associated leftward deviation of trachea.  Of note patient also does report issue with swallowing with odd sensation at base of the neck, presumably related to abnormal thyroid.    12/14: significant improvement of respiratory distress. Remained off bipap all day.  Continue steroids and azithromycin, otherwise discontinued other antibiotics.  Given overall improvement, later deescalated from ICU to general medicine floors.  Case discussed with hospitalist and care transferred.        Assessment and plan:     Neuro / MSK  No acute issues     Cardiac  Hypertension  - Continue home amlodipine     Pulm  Acute Hypoxic Respiratory Failure  RSV  Reactive Airway Disease  - positive for RSV.   - viral panel otherwise negative including negative COVID and flu  - MRSA negative  - Initiated on empiric antibiotics for presumed pneumonia related to COPD exacerbation however patient does not carry diagnosis of COPD and lung fields appear clear on CXR.  Also procalcitonin negative and WBCs minimally elevated.  I am less concerned for pneumonia and will discontinue empiric antibiotics, however okay with continuation of short course of both steroids and azithromycin given concern for reactive airway.              -Azithromycin 500 mg daily for 5 
   12/16/24 1318   Resting (Room Air)   SpO2 94   HR 92   During Walk (Room Air)   SpO2 96   HR 98   Rate of Dyspnea 1   After Walk   SpO2 96   HR 98   Does the Patient Qualify for Home O2 No   Does the Patient Need Portable Oxygen Tanks No       
4 Eyes Skin Assessment     NAME:  Prabhakar Moran  YOB: 1955  MEDICAL RECORD NUMBER:  [id number]    The patient is being assessed for  Transfer to New Unit    I agree that at least one RN has performed a thorough Head to Toe Skin Assessment on the patient. ALL assessment sites listed below have been assessed.      Areas assessed by both nurses:    Head, Face, Ears, Shoulders, Back, Chest, Arms, Elbows, Hands, Sacrum. Buttock, Coccyx, Ischium, Legs. Feet and Heels, and Under Medical Devices   Patient has a non blanchable erythema on the left heel, scattered ecchymosis on the left hip, left knee and upper thigh. Abrasions was also found on the lower legs        Does the Patient have a Wound? No noted wound(s)       Bobby Prevention initiated by RN: Yes  Wound Care Orders initiated by RN: No    Pressure Injury (Stage 3,4, Unstageable, DTI, NWPT, and Complex wounds) if present, place Wound referral order by RN under : No    New Ostomies, if present place, Ostomy referral order under : No     Nurse 1 eSignature: Electronically signed by BRIAN OVIEDO RN on 12/14/24 at 11:39 PM EST    **SHARE this note so that the co-signing nurse can place an eSignature**    Nurse 2 eSignature: Electronically signed by Inez Harris RN on 12/15/24 at 1:04 AM EST   
Patient IV/TELE removed, discharge instructions given to patient, education provided on RSV, education on follow up appts. Speech gave education regarding mid thickened liquids, minced and moist diet. All questions answered.   
Patients tele order , primary nurse perfectserved MD regarding new tele order. MD stated if no events overnight order can be discontinued.      1147: MD ordered new tele orders for patient until discharged     
Pulmonary Disease Navigator Note  [facility]    Current GOLD classification for Prabhakar Moran    Patient's chart was reviewed by Pulmonary Disease Navigator for compliance with prescribed treatment with Global Initiative For Chronic Obstructive Lung Disease (GOLD).    Please, review beneath recommendations for pharmacological treatment for patient with obstructive lung disease.     Current Pharmacological Treatment:      Mr Moran is currently receiving Mucomyst 20% 600 mcg q8, Albuterol solution  q8, and pulmicort 0.5 mg BID    Current eosinophil count: 0  Recorded domestic exacerbations past 12 months: 3          Combination  ICS-LABA Inhaler Acceptable   Therapy  Device For Use   Salmeterol/fluticasone Advair  Diskus    Vilanterol/fluticasone  Breo  Ellipta    Formoterol/mometasone  Dulera MDI Yes   Formoterol/budesonide  Symbicort MDI Yes   Triple Therapy Recommended (For Group E) [id number]     Fluticasone/umeclidinium/vilanterol  Trelegy  Ellipta    Budesonide/glycopyrrolate/formoterol fumarate  Breztri  MDI Yes   Recommended (For Group A & B) LAMA/LABA     Vilanterol/umeclidinium  Anoro  Ellipta    Olodaterol/tiotropium  Stiolto  Respimat Yes   Formoterol/glycopyrronium  Bevespi  MDI Yes   Aclidinium/formoterol Duaklir  Pressair      *Nebulizer Options    LAMA LABA ICS   Gilmer Rider Budesonide    Performist      The CAT provides a reliable measure of the impact of COPD on a patient's health status. Range of CAT scores from 0-40. Higher scores denote a more severe impact of COPD on a patient’s life. Scores <10 have a low impact, 10-20 medium, 21-30 high and >30 very high impact, requiring gradually more interventions.     Current recorded COPD Assessment Tool (CAT) score of  Cough Assessment: 3  Phlegm Assessment: 2  Chest tightness: 3  Walking on an incline: 5  Home Activities: 3  Confident Leaving The Home: 3  Sleeping Soundly: 3  Have Energy: 4  Assessment 
Received Order for Telemetry     Prabhakar DE JESUS Luisa   1955   [id number]   COPD exacerbation (HCC) [J44.1]   Waleska Wilson MD     Tele Box # [id number] placed on patient at  2250 pm  ER Room # ICU DID NOT CALL  Admitting to Room 205  Verified with Primary Nurse Kisha at  2250 pm    
[I.V.:40]  Out: 650 [Urine:650]   12/12 1901 - 12/14 0700  In: 913.5 [P.O.:640]  Out: 2900 [Urine:2900]    Mode Rate TV Press PEEP FiO2 PIP Min. Vent               30 %              General: In no acute distress  HEENT: NC/AT. No obvious deformities.Nares patent. Oral mucosa moist. +Goiter   Cardiovascular: S1, S2 present. No murmurs noted.   Respiratory: Normal respiratory effort. Breath sounds CTA in all lung fields b/l. Wheezing throughout but good air  movement.  No retractions noted  Abdomen: BS+. Soft, nontender. No TTP in all quadrants. No guarding or rigidity   Lower Extremity: No edema or obvious ulcers/wounds noted  Neurological: CN III-XII grossly intact. Muscle strength 5/5 in UE and 5/5 in LE b/l. No facial drooping or slurring of words.   Psychiatric: Appropriate mood and affect          Data Review:     Medications reviewed  Current Facility-Administered Medications   Medication Dose Route Frequency    LORazepam (ATIVAN) injection 1 mg  1 mg IntraVENous Q6H PRN    budesonide (PULMICORT) nebulizer suspension 500 mcg  0.5 mg Nebulization BID RT    sodium chloride flush 0.9 % injection 5-40 mL  5-40 mL IntraVENous 2 times per day    sodium chloride flush 0.9 % injection 5-40 mL  5-40 mL IntraVENous PRN    0.9 % sodium chloride infusion   IntraVENous PRN    potassium chloride 20 mEq/50 mL IVPB (Central Line)  20 mEq IntraVENous PRN    Or    potassium chloride 10 mEq/100 mL IVPB (Peripheral Line)  10 mEq IntraVENous PRN    magnesium sulfate 2000 mg in 50 mL IVPB premix  2,000 mg IntraVENous PRN    enoxaparin (LOVENOX) injection 40 mg  40 mg SubCUTAneous Daily    ondansetron (ZOFRAN-ODT) disintegrating tablet 4 mg  4 mg Oral Q8H PRN    Or    ondansetron (ZOFRAN) injection 4 mg  4 mg IntraVENous Q6H PRN    acetaminophen (TYLENOL) tablet 650 mg  650 mg Oral Q6H PRN    Or    acetaminophen (TYLENOL) suppository 650 mg  650 mg Rectal Q6H PRN    [Held by provider] cefTRIAXone (ROCEPHIN) 2,000 mg in sterile water 
Or    acetaminophen (TYLENOL) suppository 650 mg  650 mg Rectal Q6H PRN    azithromycin (ZITHROMAX) 500 mg in sodium chloride 0.9 % 250 mL IVPB (Ebuo4Iuc)  500 mg IntraVENous Q24H    polyethylene glycol (GLYCOLAX) packet 17 g  17 g Oral Daily    bisacodyl (DULCOLAX) EC tablet 5 mg  5 mg Oral Daily PRN    albuterol (PROVENTIL) nebulizer solution 2.5 mg  2.5 mg Nebulization Q2H PRN    predniSONE (DELTASONE) tablet 40 mg  40 mg Oral Daily    Or    dexAMETHasone (PF) (DECADRON) injection 6 mg  6 mg IntraVENous Daily    amLODIPine (NORVASC) tablet 10 mg  10 mg Oral QHS    ipratropium 0.5 mg-albuterol 2.5 mg (DUONEB) nebulizer solution 1 Dose  1 Dose Inhalation Q6H WA RT    acetylcysteine (MUCOMYST) 20 % solution 600 mg  600 mg Inhalation Q6H WA RT    ipratropium 0.5 mg-albuterol 2.5 mg (DUONEB) nebulizer solution 1 Dose  1 Dose Inhalation Q4H PRN         All relevant laboratory and imaging results reviewed in EPIC electronic medical records.            Discussion/MDM:     [] High (any 2)    A. Problems (any 1)  [x] Acute/Chronic Illness/injury posing threat to life or bodily function:    [] Severe exacerbation of chronic illness:    ---------------------------------------------------------------------  B. Risk of Treatment (any 1)   [] Drugs/treatments that require intensive monitoring for toxicity include:    [] IV ABX requiring serial renal monitoring for nephrotoxicity:     [] IV Narcotic analgesia for adverse drug reaction  [] Aggressive IV diuresis requiring serial monitoring for renal impairment and electrolyte derangements  [] Critical electrolyte abnormalities requiring IV replacement and close serial monitoring  [] SQ Insulin SS- monitoring serial FSBS for Hypoglycemic adverse drug reaction  [] Other -   [] Change in code status:    [] Decision to escalate care:    [] Major surgery/procedure with associated risk factors:    ----------------------------------------------------------------------  CHETAN Data (any

## 2024-12-17 LAB
BACTERIA SPEC CULT: NORMAL
Lab: NORMAL

## 2024-12-19 ENCOUNTER — OFFICE VISIT (OUTPATIENT)
Facility: CLINIC | Age: 69
End: 2024-12-19

## 2024-12-19 VITALS
TEMPERATURE: 98.3 F | WEIGHT: 200 LBS | HEART RATE: 91 BPM | BODY MASS INDEX: 28.63 KG/M2 | DIASTOLIC BLOOD PRESSURE: 82 MMHG | SYSTOLIC BLOOD PRESSURE: 129 MMHG | OXYGEN SATURATION: 94 % | HEIGHT: 70 IN

## 2024-12-19 DIAGNOSIS — J20.5: ICD-10-CM

## 2024-12-19 DIAGNOSIS — J45.909 MODERATE ASTHMA, UNSPECIFIED WHETHER COMPLICATED, UNSPECIFIED WHETHER PERSISTENT: ICD-10-CM

## 2024-12-19 DIAGNOSIS — R13.19 OTHER DYSPHAGIA: ICD-10-CM

## 2024-12-19 DIAGNOSIS — E04.9 GOITER: ICD-10-CM

## 2024-12-19 DIAGNOSIS — Z87.09 HISTORY OF ACUTE RESPIRATORY FAILURE: Primary | ICD-10-CM

## 2024-12-19 LAB
BACTERIA SPEC CULT: NORMAL
BACTERIA SPEC CULT: NORMAL
Lab: NORMAL
Lab: NORMAL

## 2024-12-19 RX ORDER — BUDESONIDE AND FORMOTEROL FUMARATE DIHYDRATE 160; 4.5 UG/1; UG/1
2 AEROSOL RESPIRATORY (INHALATION) 2 TIMES DAILY
Qty: 10.2 G | Refills: 5 | Status: SHIPPED | OUTPATIENT
Start: 2024-12-19

## 2024-12-19 RX ORDER — PREDNISONE 10 MG/1
TABLET ORAL
Qty: 15 TABLET | Refills: 0 | Status: SHIPPED | OUTPATIENT
Start: 2024-12-19

## 2024-12-19 RX ORDER — NIACINAMIDE, ADENOSINE 1; .02 G/50ML; G/50ML
CREAM TOPICAL
Qty: 284 G | Refills: 1 | Status: SHIPPED | OUTPATIENT
Start: 2024-12-19

## 2024-12-19 ASSESSMENT — ENCOUNTER SYMPTOMS
SHORTNESS OF BREATH: 0
VOMITING: 0
NAUSEA: 0
ABDOMINAL PAIN: 0
COUGH: 1
DIARRHEA: 0

## 2024-12-19 NOTE — PROGRESS NOTES
.  Chief Complaint   Patient presents with    Follow-Up from Hospital     [facility] [place] [facility] 12/13/24-12/16/24    Discuss Medications     Would like Rx for Thick It     \"Have you been to the ER, urgent care clinic since your last visit?  Hospitalized since your last visit?\"    YES - When: approximately 1  weeks ago.  Where and Why: [facility] [place] [facility] for shortness of breath.    “Have you seen or consulted any other health care providers outside our system since your last visit?”    NO    Blood pressure 129/82, pulse 91, temperature 98.3 °F (36.8 °C), temperature source Oral, height 1.778 m (5' 10\"), weight 90.7 kg (200 lb), SpO2 94%.            
  Diagnosis Date    Asthma     Benign essential hypertension     Bronchiectasis without complication (HCC)     Elevated fasting blood sugar     Eosinophilia, unspecified type     GERD (gastroesophageal reflux disease)     Hypercholesterolemia     Hypertension     Mild anemia     Multinodular goiter     Obesity (BMI 30.0-34.9)     Thrombocytopenia (HCC)     Thrombocytopenia (HCC)     Thrombocytopenia (HCC) 01/14/2023    Thyroid nodule     Vitamin D deficiency         Family History   Problem Relation Age of Onset    High Blood Pressure Father     Hypertension Father         Past Surgical History:   Procedure Laterality Date    IR GUIDED FNA  11/2/2022       Social History     Tobacco Use    Smoking status: Never    Smokeless tobacco: Never   Vaping Use    Vaping status: Never Used   Substance Use Topics    Alcohol use: Never    Drug use: Never         Review of Systems   Constitutional:  Negative for appetite change and fatigue.   HENT:  Positive for congestion.    Eyes:  Negative for visual disturbance.   Respiratory:  Positive for cough. Negative for shortness of breath.    Cardiovascular:  Negative for chest pain and leg swelling.   Gastrointestinal:  Negative for abdominal pain, diarrhea, nausea and vomiting.   Genitourinary:  Negative for dysuria.   Skin:  Negative for rash.   Neurological:  Negative for tremors and headaches.   Psychiatric/Behavioral:  The patient is not nervous/anxious.        /82 (Site: Right Upper Arm, Position: Sitting, Cuff Size: Medium Adult)   Pulse 91   Temp 98.3 °F (36.8 °C) (Oral)   Ht 1.778 m (5' 10\")   Wt 90.7 kg (200 lb)   SpO2 94%   BMI 28.70 kg/m²      Physical Exam  Constitutional:        Patient is with his wife Ayana.No acute distress noted but he has been coughing frequently.  HENT:      Head: Normocephalic and atraumatic.      Right Ear: External ear normal.      Left Ear: External ear normal.      Nose: Nose normal.      Mouth/Throat:      Mouth: Mucous

## 2025-01-12 DIAGNOSIS — I10 ESSENTIAL HYPERTENSION, BENIGN: ICD-10-CM

## 2025-01-12 DIAGNOSIS — J45.909 MODERATE ASTHMA, UNSPECIFIED WHETHER COMPLICATED, UNSPECIFIED WHETHER PERSISTENT: ICD-10-CM

## 2025-01-12 DIAGNOSIS — E55.9 VITAMIN D DEFICIENCY: ICD-10-CM

## 2025-01-12 RX ORDER — CHOLECALCIFEROL (VITAMIN D3) 25 MCG
1 TABLET ORAL DAILY
Qty: 90 TABLET | Refills: 0 | Status: SHIPPED | OUTPATIENT
Start: 2025-01-12

## 2025-01-27 ENCOUNTER — TELEPHONE (OUTPATIENT)
Dept: PHARMACY | Facility: CLINIC | Age: 70
End: 2025-01-27

## 2025-01-27 NOTE — TELEPHONE ENCOUNTER
CLINICAL PHARMACY NOTE - Population [facility] Pharm[facility] Referral    Patient can be scheduled with:  Team Schedule- General Referrals, etc.    Received a referral from: Care Coordinator to discuss patient’s medications. Called patient to schedule a time to speak with a pharmacist over the telephone.     No answer.  VM not set up    Patient is located in [place].  Please schedule Monday-Thursday 8AM-4PM with Christine Gilman or Stacie for licensing purposes.      Angela Belcher CP.   [facility] Clinical   [facility] Clinical [facility]  Toll free: [phone] Option 1

## 2025-01-27 NOTE — TELEPHONE ENCOUNTER
----- Message from CORI GARCIA RN sent at 1/27/2025  2:17 PM EST -----  Regarding: Referral  Patient reports financial strain related to the cost of Symbicort; he reports a cost of $160 for a one month supply. Patient could benefit from a referral to the Ambulatory Clinical Pharmacy Team for further assessment and assistance; patient is agreeable to this referral. Referral sent today; patient notified.

## 2025-01-29 NOTE — TELEPHONE ENCOUNTER
Reached patient and scheduled for 2/325 at [facility].   Population Health Clinical   [facility]  Toll free: [phone] Option 1     For Pharmacy Admin Tracking Only    Program: ClearViewâ„¢ Audio  CPA in place:  No  Recommendation Provided To: Patient/Caregiver: 1 via Telephone  Intervention Detail: Scheduled Appointment  Intervention Accepted By: Patient/Caregiver: 1  Gap Closed?: Yes   Time Spent (min): 10

## 2025-02-03 ENCOUNTER — TELEPHONE (OUTPATIENT)
Dept: PHARMACY | Facility: CLINIC | Age: 70
End: 2025-02-03

## 2025-02-03 NOTE — TELEPHONE ENCOUNTER
Population Health Clinical Pharmacy: Cost concern  Patient outreach to review - female answered, patient in background. Patient currently driving at scheduled appointment time, so not a good time. Asks for call back after 1p. Multiple attempts to reach patient between 130p and 3p - no answer and voicemail not set up.     Referral by CC d/t Symbicort cost concern    Chart/review information below:  Pharmacy:    [facility] [place] [address] - [address] [facility] - P [phone] - F [phone]  [address] [facility] [place] [address]  Phone: [phone] Fax: [phone]    Insurance  Patient Current Primary Pharmacy Insurance per [employer] Benefits: [place] [employer],     Copayment information per medicare.gov plan compares:  Deductible?: appears to range from $150 (for plan ID [id number]) to $320 (for plan ID -[id number]), to $590 (for plan ID -[id number]), depending which [place] [employer] plan  Appears cost is likely closer to $48 after deductible is met   Also appears budesonide-formoterol still least expensive option (vs Breo or Wixela; Symbicort brand and fluticasone-vilanterol not covered)  Also appears it may be less expensive at preferred mail order pharmacy    Medicare Payment Plan Option  The Medicare Prescription Payment Plan is a new payment option in the prescription drug law that works with your current drug coverage to help you manage your out-of-pocket costs for drugs covered by your plan by spreading them across the calendar year (January-December).   Tool to ask patient if this will help: Will this payment option help me?  Likely not a benefit/help for patient - will not meet $2000 out of pocket max this year with current rxs     Patient Assistance  No current patient assistance program for Symbicort  Breo patient assistance program, through [employer]:   Must have paid a total of $600 for prescriptions in the current calendar year   Max Annual Gross

## 2025-02-18 ENCOUNTER — OFFICE VISIT (OUTPATIENT)
Age: 70
End: 2025-02-18
Payer: MEDICARE

## 2025-02-18 VITALS
BODY MASS INDEX: 27.06 KG/M2 | WEIGHT: 189 LBS | HEIGHT: 70 IN | RESPIRATION RATE: 16 BRPM | HEART RATE: 97 BPM | OXYGEN SATURATION: 98 % | SYSTOLIC BLOOD PRESSURE: 162 MMHG | DIASTOLIC BLOOD PRESSURE: 90 MMHG

## 2025-02-18 DIAGNOSIS — J44.9 CHRONIC OBSTRUCTIVE PULMONARY DISEASE, UNSPECIFIED COPD TYPE (HCC): ICD-10-CM

## 2025-02-18 DIAGNOSIS — J32.4 CHRONIC PANSINUSITIS: ICD-10-CM

## 2025-02-18 DIAGNOSIS — E04.2 MULTINODULAR GOITER: Primary | ICD-10-CM

## 2025-02-18 DIAGNOSIS — R13.14 PHARYNGOESOPHAGEAL DYSPHAGIA: ICD-10-CM

## 2025-02-18 PROCEDURE — 3077F SYST BP >= 140 MM HG: CPT | Performed by: OTOLARYNGOLOGY

## 2025-02-18 PROCEDURE — 3080F DIAST BP >= 90 MM HG: CPT | Performed by: OTOLARYNGOLOGY

## 2025-02-18 PROCEDURE — 1123F ACP DISCUSS/DSCN MKR DOCD: CPT | Performed by: OTOLARYNGOLOGY

## 2025-02-18 PROCEDURE — 99214 OFFICE O/P EST MOD 30 MIN: CPT | Performed by: OTOLARYNGOLOGY

## 2025-02-18 NOTE — PROGRESS NOTES
Subjective:      Prabhakar Moran    67 y.o.    1955       Followup Visit      Location - sinus      Quality - chronic sinus, cough      Severity -  moderate      Duration - years      Timing - chronic      Context - pt with known history of chronic sinusitis and thyroid nodule followed in the past but has not been seen in a couple of years.  He was recently seen in the emergency department after a fall and he had a head CT done showing his chronic sinusitis  and he was recommended following back up with me.  He was placed on Augmentin.  He complains of chronic cough and sticky jellylike mucus      Modifying Features -antibiotics helped minimally      Associated symptoms/signs -COPD, asthma      7/19/2022 -follows up today after allergy testing.  Symptoms remain the same.  Still complains of stringy glue-like mucus      12/6/22   5 mos fu -pt had his thyroid bx, still having some cough.  He also had a chest CT      2/18/25  Pt follows up today after recent hospitalization for SOB.  Has not been seen since 2022.  He has multiple issues.  SOB, dysphagia and aspiration concerns, ongoing chronic sinusitis, goiter.  Pt was referred back for above problems.  He does have difficulty with swallowing certain foods, states sinus problems have been better.  Follows with Dr Amor for pulmonary      Review of Systems   Review of Systems    Constitutional:  Negative for chills and fever.    HENT:  Positive for congestion. Negative for ear pain, hearing loss, nosebleeds and tinnitus.     Eyes:  Negative for blurred vision and double vision.    Respiratory:  Positive for cough and sputum production . Negative for shortness of breath.     Cardiovascular:  Negative for chest pain and palpitations.    Gastrointestinal:  Negative for heartburn, nausea and vomiting.    Musculoskeletal:  Positive for falls, joint pain  and myalgias. Negative for neck pain.    Skin: Negative.     Neurological:  Negative for dizziness, speech

## 2025-03-07 ENCOUNTER — TELEPHONE (OUTPATIENT)
Facility: CLINIC | Age: 70
End: 2025-03-07

## 2025-03-07 NOTE — TELEPHONE ENCOUNTER
Patients wife came into the office.patient was in the hospital and was diagnosed with a swallowing problem. Wants to know if Thick it food and Beverage thickener can be sent in . He will call to schedule hospital f/u

## 2025-03-07 NOTE — TELEPHONE ENCOUNTER
Returned call to patient. Wife, Ayana, stated patient was in Hospital in December.  Patient was seen by Dr. Stevens 01/2025 and ordered Thick It per patient request.   [facility]  Kandyia will call pharmacy to have it filled.    Patient has been purchasing Thick It over the counter.

## 2025-03-10 ENCOUNTER — TELEPHONE (OUTPATIENT)
Dept: PHARMACY | Facility: CLINIC | Age: 70
End: 2025-03-10

## 2025-03-10 NOTE — TELEPHONE ENCOUNTER
CLINICAL PHARMACY NOTE - [facility] Pharmacy Referral    Patient can be scheduled with:  Team Schedule- General Referrals, etc.    Received a referral from: Care Coordinator to discuss patient’s medications. Called patient to schedule a time to speak with a pharmacist over the telephone.     No answer and VM not set up    Angela Belcher CPhT.   [facility] Clinical   [facility]  Toll free: [phone] Option 1    Patient is located in [place].  Please schedule Monday-Thursday 8AM-4PM with Christine Gilman or Stacie for licensing purposes.

## 2025-03-11 ENCOUNTER — HOSPITAL ENCOUNTER (OUTPATIENT)
Facility: HOSPITAL | Age: 70
Setting detail: RECURRING SERIES
Discharge: HOME | End: 2025-03-14
Attending: INTERNAL MEDICINE
Payer: MEDICARE

## 2025-03-11 PROCEDURE — 92610 EVALUATE SWALLOWING FUNCTION: CPT

## 2025-03-11 NOTE — THERAPY EVALUATION
[facility] [address], [address]  [place] [address]  Ph: [phone]     Fax: [phone]         SPEECH LANGUAGE PATHOLOGY - MEDICARE EVALUATION/PLAN OF CARE NOTE (updated 3/23)      Date: 3/11/2025          Patient Name:  Prabhakar Moran :  1955   Medical   Diagnosis:  Other dysphagia [R13.19] Treatment Diagnosis:  R13.12 Dysphagia, oropharyngeal phase and R13.13 Dysphagia, pharyngeal phase   Referral Source:  Houston Stevens MD Provider #:  [id number]                Insurance: Payor: [facility] MEDICARE / Plan: [facility] HEALTHKEEPERS MEDIBLUE PLUS / Product Type: *No Product type* /      Patient  verified yes     Visit #   Current  / Total 1    Time   In / Out 1400 1500   Total Treatment Time 60           SUBJECTIVE  Pain Level: [x]  Verbal (0-10 scale):0  []  Flacc []  Luis Romeo    []constant []intermittent []improving []worsening []no change since onset    Any medication changes, allergies to medications, adverse drug reactions, diagnosis change, or new procedure performed?: [x] No    [] Yes (see summary sheet for update)  Medications: Verified on Patient Summary List    Subjective functional status/changes:     Patient seen for swallow evaluation. Patient is accompanied by his wife.     Start of Care: 3/11/2025  Onset Date: chronic; reports coughing when eating since the 80's.   Current symptoms/Complaints: coughing when drinking, gets choked on solids and has to stick finger down throat.   Mechanism of Injury: N/A  PLOF: Independent   Limitations to PLOF/Activity or Recreational Limitations: Independent   Work Hx: retired. Use to work for [employer]  Living Situation: Lives w/ wife   Mobility: Independent   Self Care: Independent   Previous Treatment/Compliance: N/A  PMHx/Surgical Hx/Comorbidites: MBS completed 25  Prior Hospitalization:  -[facility] for Acute respiratory failure with hypoxia and hypercapnia, 2/2 to RSV

## 2025-03-12 NOTE — TELEPHONE ENCOUNTER
2nd attempt to contact this patient regarding the previous message  CLINICAL PHARMACY:REFERRAL  Patient unavailable at the time of call.     No answer and VM not set up.    Angela Belcher [facility].   Population Health Clinical   [facility] Clinical Pharmacy  Toll free: [phone] Option 1     Will route to PharmD for review and recommendation     For Pharmacy Admin Tracking Only    Program: Clicker  CPA in place:  No  Gap Closed?: No   Time Spent (min): 10

## 2025-03-13 ENCOUNTER — TELEPHONE (OUTPATIENT)
Dept: PHARMACY | Facility: CLINIC | Age: 70
End: 2025-03-13

## 2025-03-13 RX ORDER — FLUTICASONE FUROATE, UMECLIDINIUM BROMIDE AND VILANTEROL TRIFENATATE 100; 62.5; 25 UG/1; UG/1; UG/1
1 POWDER RESPIRATORY (INHALATION) DAILY
COMMUNITY
Start: 2025-03-07

## 2025-03-13 NOTE — TELEPHONE ENCOUNTER
[facility] Clinical Pharmacy: Medication Cost Analysis    CC:\"Spouse reports that patient is unable to fill his prescription for Thick-It at the local pharmacy due to insurance coverage issues. Patient could benefit from a referral to the Ambulatory Clinical Pharmacy Team for further assessment and assistance; spouse is agreeable to this referral. Referral sent today. \"    SUBJECTIVE/OBJECTIVE  Prabhakar Moran is a 69 y.o. male with  has a past medical history of Asthma, Benign essential hypertension, Bronchiectasis without complication (HCC), Elevated fasting blood sugar, Eosinophilia, unspecified type, GERD (gastroesophageal reflux disease), Hypercholesterolemia, Hypertension, Mild anemia, Multinodular goiter, Obesity (BMI 30.0-34.9), Thrombocytopenia, Thrombocytopenia, Thrombocytopenia, Thyroid nodule, and Vitamin D deficiency. referred to a clinical pharmacy specialist by Heidi Harper RN for evaluation of cost of Thick-it    CSS tried to reach out to patient/patient's wife to schedule a time to talk with the patient. This was unsuccessful. On a review of this patient's insurance. Thick-it is an OTC item. It is not covered by the patient's insurance.    His plan does have an Everyday Allowance option. $75 is deposited to a [employer] spending card each month that can be used on Groceries, OTC items, assistive devices, and utilities.     He may use this card towards the thick-it. In order to use this card for the Thick-it he must buy at a participating grocer, or purchase online.  He may go to [email or web address]   and log in, this will show his balance, he may shop from this site      Other discrepancies:  2/19/2025 Care Coordination note:  Patient continues to express concerns regarding the cost of Symbicort; recall that this AC previously referred the patient to the Ambulatory Clinical Pharmacy Team on 1/27/2025. Per chart review, the Ambulatory Clinical Pharmacy Team attempted

## 2025-03-18 ENCOUNTER — HOSPITAL ENCOUNTER (OUTPATIENT)
Facility: HOSPITAL | Age: 70
Setting detail: RECURRING SERIES
Discharge: HOME | End: 2025-03-21
Attending: INTERNAL MEDICINE
Payer: MEDICARE

## 2025-03-18 PROCEDURE — 92526 ORAL FUNCTION THERAPY: CPT

## 2025-03-18 NOTE — PROGRESS NOTES
penetration/aspiration on 8/10 swallows   -Participate in NMES Protocol   -improve sEMG readings; following x 10 tx of NMES  -Complete pharyngeal and laryngeal strengthening exercises to improve pharyngeal strength, elevation/protraction and airway protection   -Improve MD Farnsworth Dysphagia Inventory by 10   -Establish HEP of swallowing exercises      Long Term Goals: To be accomplished in 16-20 treatments.  -Repeat MBS  -Reduce clinical indicators of penetration and aspiration w/ LRD   -independent use of compensatory swallow strategies to reduced clinical indicators of penetration and aspiration    PLAN  Yes  Continue plan of care  Re-Cert Due: 3/11/25-6/9/25   []  Upgrade activities as tolerated  []  Discharge due to :  [x]  Other: Continue w/ ST goals      EDU Buck       3/18/2025       12:58 PM

## 2025-03-20 ENCOUNTER — APPOINTMENT (OUTPATIENT)
Facility: HOSPITAL | Age: 70
End: 2025-03-20
Attending: INTERNAL MEDICINE
Payer: MEDICARE

## 2025-03-20 ENCOUNTER — HOSPITAL ENCOUNTER (OUTPATIENT)
Facility: HOSPITAL | Age: 70
Setting detail: RECURRING SERIES
Discharge: HOME | End: 2025-03-23
Attending: INTERNAL MEDICINE
Payer: MEDICARE

## 2025-03-20 PROCEDURE — 92526 ORAL FUNCTION THERAPY: CPT

## 2025-03-20 NOTE — PROGRESS NOTES
SPEECH LANGUAGE PATHOLOGY -  DAILY TREATMENT NOTE (updated 3/23)      Date: 3/20/2025          Patient Name:  Prabhakar Moran :  1955   Medical   Diagnosis:  Other dysphagia [R13.19] Treatment Diagnosis:  R13.12 Dysphagia, oropharyngeal phase and R13.13 Dysphagia, pharyngeal phase   Referral Source:  Houston Stevens MD Insurance:   Payor: [facility] MEDICARE / Plan: [facility] HEALTHKEEPERS MEDIBLUE PLUS / Product Type: *No Product type* /                      Patient  verified yes     Visit #   Current  / Total 3    Time   In / Out 1015 1100   Total Treatment Time 45       SUBJECTIVE    Pain Level (0-10 scale): 0    Any medication changes, allergies to medications, adverse drug reactions, diagnosis change, or new procedure performed?: [x] No    [] Yes (see summary sheet for update)  Medications: Verified on Patient Summary List    Subjective functional status/changes:     Patient seen for  ST tx. He is accompanied by his spouse.   ENT approved NMES tx.     OBJECTIVE      Modality rationale: To achieve a contraction of the suprahyoid muscle while maintaining patient comfort paramount and to simultaneously pair swallowing with NMES stimulation.    Goal   of NMES: [] Maintain and strengthen muscle mass during inactive periods-NPO  [x] Maintain/gain ROM for optimal hyolaryngeal excursion and airway closure  [x] Facilitate voluntary motor control for swallowing maneuvers  [x] Increase sensory awareness  [x] Restore normal balance to system as ascending sensory information is reintegrated into movement patterns   Type Additional Details   [x] NMES:   [x] Guardian   [x] sEMG Location: Suprahyoids      [x] Skin assessment post-treatment:  [x]intact []redness- no adverse reaction       []redness - adverse reaction:       At the time of patient's initial evaluation 3/20/25, application of sEMG to the suprahyoid muscles revealed 500 mV of muscle activation. Patient ingested thin liquids. No overt clinical

## 2025-03-25 ENCOUNTER — HOSPITAL ENCOUNTER (OUTPATIENT)
Facility: HOSPITAL | Age: 70
Setting detail: RECURRING SERIES
Discharge: HOME | End: 2025-03-28
Attending: INTERNAL MEDICINE
Payer: MEDICARE

## 2025-03-25 PROCEDURE — 92526 ORAL FUNCTION THERAPY: CPT

## 2025-03-25 NOTE — PROGRESS NOTES
SPEECH LANGUAGE PATHOLOGY -  DAILY TREATMENT NOTE (updated 3/23)      Date: 3/25/2025          Patient Name:  Prabhakar Moran :  1955   Medical   Diagnosis:  Other dysphagia [R13.19] Treatment Diagnosis:  R13.12 Dysphagia, oropharyngeal phase and R13.13 Dysphagia, pharyngeal phase   Referral Source:  Houston Stevens MD Insurance:   Payor: [facility] MEDICARE / Plan: [facility] HEALTHKEEPERS MEDIBLUE PLUS / Product Type: *No Product type* /                      Patient  verified yes     Visit #   Current  / Total 4    Time   In / Out 1440 1520   Total Treatment Time 40       SUBJECTIVE    Pain Level (0-10 scale): 0    Any medication changes, allergies to medications, adverse drug reactions, diagnosis change, or new procedure performed?: [x] No    [] Yes (see summary sheet for update)  Medications: Verified on Patient Summary List    Subjective functional status/changes:     Patient seen for  [facility]. He is accompanied by his spouse.       OBJECTIVE      Modality rationale: To achieve a contraction of the suprahyoid muscle while maintaining patient comfort paramount and to simultaneously pair swallowing with NMES stimulation.    Goal   of NMES: [] Maintain and strengthen muscle mass during inactive periods-NPO  [x] Maintain/gain ROM for optimal hyolaryngeal excursion and airway closure  [x] Facilitate voluntary motor control for swallowing maneuvers  [x] Increase sensory awareness  [x] Restore normal balance to system as ascending sensory information is reintegrated into movement patterns   Type Additional Details   [x] NMES:   [x] Guardian   [] sEMG Location: Suprahyoids      [x] Skin assessment post-treatment:  [x]intact []redness- no adverse reaction       []redness - adverse reaction:       Patient was seen for 40 minutes for swallowing therapy in conjunction with NMES applied to the suprahyoids ( 15.0 mA ). Patient ingested mildly thick liquids during muscle contraction.  Clinical indicators of

## 2025-03-27 ENCOUNTER — HOSPITAL ENCOUNTER (OUTPATIENT)
Facility: HOSPITAL | Age: 70
Setting detail: RECURRING SERIES
Discharge: HOME | End: 2025-03-30
Attending: INTERNAL MEDICINE
Payer: MEDICARE

## 2025-03-27 PROCEDURE — 92526 ORAL FUNCTION THERAPY: CPT

## 2025-03-27 NOTE — PROGRESS NOTES
SPEECH LANGUAGE PATHOLOGY -  DAILY TREATMENT NOTE (updated 3/23)      Date: 3/27/2025          Patient Name:  Prabhakar Moran :  1955   Medical   Diagnosis:  Other dysphagia [R13.19] Treatment Diagnosis:  R13.12 Dysphagia, oropharyngeal phase and R13.13 Dysphagia, pharyngeal phase   Referral Source:  Houston Stevens MD Insurance:   Payor: [facility] MEDICARE / Plan: [facility] HEALTHKEEPERS MEDIBLUE PLUS / Product Type: *No Product type* /                      Patient  verified yes     Visit #   Current  / Total 5    Time   In / Out 1430 1500   Total Treatment Time 40       SUBJECTIVE    Pain Level (0-10 scale): 0    Any medication changes, allergies to medications, adverse drug reactions, diagnosis change, or new procedure performed?: [x] No    [] Yes (see summary sheet for update)  Medications: Verified on Patient Summary List    Subjective functional status/changes:     Patient seen for  [facility]. He is accompanied by his spouse.       OBJECTIVE      Modality rationale: To achieve a contraction of the suprahyoid muscle while maintaining patient comfort paramount and to simultaneously pair swallowing with NMES stimulation.    Goal   of NMES: [] Maintain and strengthen muscle mass during inactive periods-NPO  [x] Maintain/gain ROM for optimal hyolaryngeal excursion and airway closure  [x] Facilitate voluntary motor control for swallowing maneuvers  [x] Increase sensory awareness  [x] Restore normal balance to system as ascending sensory information is reintegrated into movement patterns   Type Additional Details   [x] NMES:   [x] Guardian   [] sEMG Location: Suprahyoids      [x] Skin assessment post-treatment:  [x]intact []redness- no adverse reaction       []redness - adverse reaction:       Patient was seen for 30 minutes for swallowing therapy in conjunction with NMES applied to the suprahyoids ( 14.0 mA ). Patient ingested mildly thick liquids during muscle contraction.  Clinical indicators of

## 2025-04-01 ENCOUNTER — HOSPITAL ENCOUNTER (OUTPATIENT)
Facility: HOSPITAL | Age: 70
Setting detail: RECURRING SERIES
Discharge: HOME | End: 2025-04-04
Attending: INTERNAL MEDICINE
Payer: MEDICARE

## 2025-04-01 PROCEDURE — 92526 ORAL FUNCTION THERAPY: CPT

## 2025-04-01 NOTE — PROGRESS NOTES
SPEECH LANGUAGE PATHOLOGY -  DAILY TREATMENT NOTE (updated 3/23)      Date: 2025          Patient Name:  Prabhakar Moran :  1955   Medical   Diagnosis:  Other dysphagia [R13.19] Treatment Diagnosis:  R13.12 Dysphagia, oropharyngeal phase and R13.13 Dysphagia, pharyngeal phase   Referral Source:  Houston Stevens MD Insurance:   Payor: [facility] MEDICARE / Plan: [facility] HEALTHKEEPERS MEDIBLUE PLUS / Product Type: *No Product type* /                      Patient  verified yes     Visit #   Current  / Total 6    Time   In / Out 1400 1440   Total Treatment Time 40       SUBJECTIVE    Pain Level (0-10 scale): 0    Any medication changes, allergies to medications, adverse drug reactions, diagnosis change, or new procedure performed?: [x] No    [] Yes (see summary sheet for update)  Medications: Verified on Patient Summary List    Subjective functional status/changes:     Patient seen for  [facility]. He is accompanied by his spouse.       OBJECTIVE      Modality rationale: To achieve a contraction of the suprahyoid muscle while maintaining patient comfort paramount and to simultaneously pair swallowing with NMES stimulation.    Goal   of NMES: [] Maintain and strengthen muscle mass during inactive periods-NPO  [x] Maintain/gain ROM for optimal hyolaryngeal excursion and airway closure  [x] Facilitate voluntary motor control for swallowing maneuvers  [x] Increase sensory awareness  [x] Restore normal balance to system as ascending sensory information is reintegrated into movement patterns   Type Additional Details   [x] NMES:   [x] Guardian   [x] sEMG Location: Suprahyoids      [x] Skin assessment post-treatment:  [x]intact []redness- no adverse reaction       []redness - adverse reaction:       Patient was seen for 40 minutes for swallowing therapy in conjunction with NMES applied to the suprahyoids ( 12.5 mA ). Patient ingested mildly thick liquids during muscle contraction.  Clinical indicators of

## 2025-04-10 ENCOUNTER — HOSPITAL ENCOUNTER (OUTPATIENT)
Facility: HOSPITAL | Age: 70
Setting detail: RECURRING SERIES
Discharge: HOME | End: 2025-04-13
Attending: INTERNAL MEDICINE
Payer: MEDICARE

## 2025-04-10 PROCEDURE — 92526 ORAL FUNCTION THERAPY: CPT

## 2025-04-10 NOTE — PROGRESS NOTES
SPEECH LANGUAGE PATHOLOGY -  DAILY TREATMENT NOTE (updated 3/23)      Date: 4/10/2025          Patient Name:  Prabhakar Moran :  1955   Medical   Diagnosis:  Other dysphagia [R13.19] Treatment Diagnosis:  R13.12 Dysphagia, oropharyngeal phase and R13.13 Dysphagia, pharyngeal phase   Referral Source:  Houston Stevens MD Insurance:   Payor: [facility] MEDICARE / Plan: [facility] HEALTHKEEPERS MEDIBLUE PLUS / Product Type: *No Product type* /                      Patient  verified yes     Visit #   Current  / Total 7    Time   In / Out 1445 1530   Total Treatment Time 45       SUBJECTIVE    Pain Level (0-10 scale): 0    Any medication changes, allergies to medications, adverse drug reactions, diagnosis change, or new procedure performed?: [x] No    [] Yes (see summary sheet for update)  Medications: Verified on Patient Summary List    Subjective functional status/changes:     Patient seen for  [facility]. He is accompanied by his spouse.   Patient reported he had a hamburger and french fries without difficulty over the weekend.       OBJECTIVE      Modality rationale: To achieve a contraction of the suprahyoid muscle while maintaining patient comfort paramount and to simultaneously pair swallowing with NMES stimulation.    Goal   of NMES: [] Maintain and strengthen muscle mass during inactive periods-NPO  [x] Maintain/gain ROM for optimal hyolaryngeal excursion and airway closure  [x] Facilitate voluntary motor control for swallowing maneuvers  [x] Increase sensory awareness  [x] Restore normal balance to system as ascending sensory information is reintegrated into movement patterns   Type Additional Details   [x] NMES:   [x] Guardian   [x] sEMG Location: Suprahyoids      [x] Skin assessment post-treatment:  [x]intact []redness- no adverse reaction       []redness - adverse reaction:       Patient was seen for 45 minutes for swallowing therapy in conjunction with NMES applied to the suprahyoids ( 15.0 mA ).

## 2025-04-10 NOTE — PROGRESS NOTES
[facility] [address], [address]  [place] [address]  Ph: [phone]     Fax: [phone]    SPEECH LANGUAGE PATHOLOGY PROGRESS NOTE  Patient Name:  Prabhakar Moran :  1955   Treatment/Medical Diagnosis: Other dysphagia [R13.19]   Referral Source:  Houston Stevens MD     Date of Initial Visit:  3/11/25 Attended Visits:  7 Missed Visits:  0     SUMMARY OF TREATMENT/ASSESSMENT:    NMES initiated and patient has completed 6/10 tx to date. Patient reports tolerating increased textures at home w/ reduced clinical indicators of penetration and aspiration. Inconsistent overt s/sx of aspiration continue during all thin and thickened liquid trials. S/sx are slightly mitigated w/ mildly thick liquids.   Patient is compliant w/ home swallow exercises and diet modifications. Continues to need practice w/ recall of swallow and aspiration precautions.   MARÍA Farnsworth and sEMG not re-administered to date. Will administer following 10 treatments.       CURRENT STATUS/GOALS:    MARÍA Farnsworth Dysphagia Inventory: 58.94%  Composite Score ranges from 20 ( extremely low functioning ) to 100 ( high functioning ).        Short Term Goals: To be accomplished in 8-10 treatments.  -Recall compensatory swallow strategies independently  [] Met [x] Not met [] Partially met  Date:   -Recall aspiration precautions independently  [] Met [x] Not met [] Partially met  Date:   -Patient will tolerate small controlled sips of thin liquids using compensatory swallow strategies w/o clinical indicators of penetration/aspiration on 10 swallows   [] Met [] Not met [x] Partially met  Date: 4/10/25  -Participate in NMES Protocol   [] Met [] Not met [x] Partially met  Date: 6/10 tx completed  -improve sEMG readings; following x 10 tx of NMES  [] Met [x] Not met [] Partially met  Date: not re-administered to date  -Complete pharyngeal and laryngeal strengthening exercises to improve pharyngeal

## 2025-04-12 DIAGNOSIS — I10 ESSENTIAL HYPERTENSION, BENIGN: ICD-10-CM

## 2025-04-12 DIAGNOSIS — J45.909 MODERATE ASTHMA, UNSPECIFIED WHETHER COMPLICATED, UNSPECIFIED WHETHER PERSISTENT: ICD-10-CM

## 2025-04-12 DIAGNOSIS — E55.9 VITAMIN D DEFICIENCY: ICD-10-CM

## 2025-04-12 RX ORDER — IPRATROPIUM BROMIDE AND ALBUTEROL SULFATE 2.5; .5 MG/3ML; MG/3ML
SOLUTION RESPIRATORY (INHALATION)
Qty: 360 ML | Refills: 3 | Status: SHIPPED | OUTPATIENT
Start: 2025-04-12

## 2025-04-15 ENCOUNTER — HOSPITAL ENCOUNTER (OUTPATIENT)
Facility: HOSPITAL | Age: 70
Setting detail: RECURRING SERIES
Discharge: HOME | End: 2025-04-18
Attending: INTERNAL MEDICINE
Payer: MEDICARE

## 2025-04-15 PROCEDURE — 92526 ORAL FUNCTION THERAPY: CPT

## 2025-04-15 NOTE — PROGRESS NOTES
SPEECH LANGUAGE PATHOLOGY -  DAILY TREATMENT NOTE (updated 3/23)      Date: 4/15/2025          Patient Name:  Prabhakar Moran :  1955   Medical   Diagnosis:  Other dysphagia [R13.19] Treatment Diagnosis:  R13.12 Dysphagia, oropharyngeal phase and R13.13 Dysphagia, pharyngeal phase   Referral Source:  Houston Stevens MD Insurance:   Payor: [facility] MEDICARE / Plan: [facility] [employer]ALTHKEEPERS MEDIBLUE PLUS / Product Type: *No Product type* /                      Patient  verified yes     Visit #   Current  / Total 8    Time   In / Out 0910 0945   Total Treatment Time 35       SUBJECTIVE    Pain Level (0-10 scale): 0    Any medication changes, allergies to medications, adverse drug reactions, diagnosis change, or new procedure performed?: [x] No    [] Yes (see summary sheet for update)  Medications: Verified on Patient Summary List    Subjective functional status/changes:     Patient seen for  [facility]. He is accompanied by his spouse.   He has no new complaints at this time.       OBJECTIVE      Modality rationale: To achieve a contraction of the suprahyoid muscle while maintaining patient comfort paramount and to simultaneously pair swallowing with NMES stimulation.    Goal   of NMES: [] Maintain and strengthen muscle mass during inactive periods-NPO  [x] Maintain/gain ROM for optimal hyolaryngeal excursion and airway closure  [x] Facilitate voluntary motor control for swallowing maneuvers  [x] Increase sensory awareness  [x] Restore normal balance to system as ascending sensory information is reintegrated into movement patterns   Type Additional Details   [x] NMES:   [x] Guardian   [] sEMG Location: Suprahyoids      [x] Skin assessment post-treatment:  [x]intact []redness- no adverse reaction       []redness - adverse reaction:       Patient was seen for 45 minutes for swallowing therapy in conjunction with NMES applied to the suprahyoids ( 15.0 mA ). Patient ingested thin liquids during muscle

## 2025-04-17 ENCOUNTER — HOSPITAL ENCOUNTER (OUTPATIENT)
Facility: HOSPITAL | Age: 70
Setting detail: RECURRING SERIES
Discharge: HOME | End: 2025-04-20
Attending: INTERNAL MEDICINE
Payer: MEDICARE

## 2025-04-17 PROCEDURE — 92526 ORAL FUNCTION THERAPY: CPT

## 2025-04-17 NOTE — PROGRESS NOTES
contraction.  Delayed onset of vocal wetness following liquid trials during NMES. Cues to patient to clear throat and he was able to clear.     Patient participated in swallow strengthening exercise of effortful swallow using sEMG ( biofeedback ) and visual game. Patient complete 8/10 effortful swallows.     Therapeutic Procedures:  Tx Min Procedure, Rationale, Specifics   40 Treatment of Swallowing: increase/improve swallow function, reduced aspiration risk and to improve patient's ability to progress towards remaining functional goals.      Details if applicable:     40    Total        [x] Review HEP    [] Progressed/Changed HEP, detail:    [] Other detail:         Other Objective/Functional Measures      Pain Level at end of session (0-10 scale): 0      Assessment     NMES initiated and patient has completed 8/10 tx to date. Patient reports tolerating increased textures at home w/ reduced clinical indicators of penetration and aspiration. Inconsistent overt s/sx of aspiration continue during all thin and thickened liquid trials. S/sx are slightly mitigated w/ mildly thick liquids.   Patient is compliant w/ home swallow exercises and diet modifications.     MARÍA Farnsworth Dysphagia Inventory: 58.94% (3/11/25)  Composite Score ranges from 20 ( extremely low functioning ) to 100 ( high functioning ).        RECOMMENDATIONS/PLAN:   DIET: soft and bite size, mildly thick. Free water protocol. Ok to trial pizza and french fries .   Compensatory swallow strategies:Bolus hold, hard swallow, cough/throat clear after swallow, slow rate  Aspiration precautions:                             [x] Yes     [] No  GERD precautions :                                   [x] Yes     [] No  Videoflouroscopy:                                      [x] Yes     [] No   HEP/Handouts given:   Home Exercise Program of swallowing exercises       Patient will continue to benefit from skilled SLP services to analyze and address swallowing deficits

## 2025-04-22 ENCOUNTER — HOSPITAL ENCOUNTER (OUTPATIENT)
Facility: HOSPITAL | Age: 70
Setting detail: RECURRING SERIES
Discharge: HOME | End: 2025-04-25
Attending: INTERNAL MEDICINE
Payer: MEDICARE

## 2025-04-22 PROCEDURE — 92526 ORAL FUNCTION THERAPY: CPT

## 2025-04-22 NOTE — PROGRESS NOTES
SPEECH LANGUAGE PATHOLOGY -  DAILY TREATMENT NOTE (updated 3/23)      Date: 2025          Patient Name:  Prabhakar Moran :  1955   Medical   Diagnosis:  Other dysphagia [R13.19] Treatment Diagnosis:  R13.12 Dysphagia, oropharyngeal phase and R13.13 Dysphagia, pharyngeal phase   Referral Source:  Houston Stevens MD Insurance:   Payor: [facility] MEDICARE / Plan: [facility] [employer]ALTH[employer]EEPERS MEDIBLUE PLUS / Product Type: *No Product type* /                      Patient  verified yes     Visit #   Current  / Total 10    Time   In / Out 1350 1435   Total Treatment Time 40       SUBJECTIVE    Pain Level (0-10 scale): 0    Any medication changes, allergies to medications, adverse drug reactions, diagnosis change, or new procedure performed?: [x] No    [] Yes (see summary sheet for update)  Medications: Verified on Patient Summary List    Subjective functional status/changes:     Patient seen for  [facility]. He is accompanied by his spouse.   He has no new complaints at this time.       OBJECTIVE      Modality rationale: To achieve a contraction of the suprahyoid muscle while maintaining patient comfort paramount and to simultaneously pair swallowing with NMES stimulation.    Goal   of NMES: [] Maintain and strengthen muscle mass during inactive periods-NPO  [x] Maintain/gain ROM for optimal hyolaryngeal excursion and airway closure  [x] Facilitate voluntary motor control for swallowing maneuvers  [x] Increase sensory awareness  [x] Restore normal balance to system as ascending sensory information is reintegrated into movement patterns   Type Additional Details   [x] NMES:   [x] Guardian   [x] sEMG Location: Suprahyoids      [x] Skin assessment post-treatment:  [x]intact []redness- no adverse reaction       []redness - adverse reaction:       Patient was seen for 45 minutes for swallowing therapy in conjunction with NMES applied to the suprahyoids ( 15.0 mA ). Patient ingested thin liquids during muscle

## 2025-04-24 ENCOUNTER — HOSPITAL ENCOUNTER (OUTPATIENT)
Facility: HOSPITAL | Age: 70
Setting detail: RECURRING SERIES
Discharge: HOME | End: 2025-04-27
Attending: INTERNAL MEDICINE
Payer: MEDICARE

## 2025-04-24 PROCEDURE — 92526 ORAL FUNCTION THERAPY: CPT

## 2025-04-24 NOTE — PROGRESS NOTES
SPEECH LANGUAGE PATHOLOGY -  DAILY TREATMENT NOTE (updated 3/23)      Date: 2025          Patient Name:  Prabhakar Moran :  1955   Medical   Diagnosis:  Other dysphagia [R13.19] Treatment Diagnosis:  R13.12 Dysphagia, oropharyngeal phase and R13.13 Dysphagia, pharyngeal phase   Referral Source:  Houston Stevens MD Insurance:   Payor: [facility] MEDICARE / Plan: [facility] [employer]ALTHKEEPERS MEDIBLUE PLUS / Product Type: *No Product type* /                      Patient  verified yes     Visit #   Current  / Total 11    Time   In / Out 1350 1430   Total Treatment Time 40       SUBJECTIVE    Pain Level (0-10 scale): 0    Any medication changes, allergies to medications, adverse drug reactions, diagnosis change, or new procedure performed?: [x] No    [] Yes (see summary sheet for update)  Medications: Verified on Patient Summary List    Subjective functional status/changes:     Patient seen for  [email or web address] He is accompanied by his spouse.   He has no new complaints at this time.       OBJECTIVE      Modality rationale: To achieve a contraction of the suprahyoid muscle while maintaining patient comfort paramount and to simultaneously pair swallowing with NMES stimulation.    Goal   of NMES: [] Maintain and strengthen muscle mass during inactive periods-NPO  [x] Maintain/gain ROM for optimal hyolaryngeal excursion and airway closure  [x] Facilitate voluntary motor control for swallowing maneuvers  [x] Increase sensory awareness  [x] Restore normal balance to system as ascending sensory information is reintegrated into movement patterns   Type Additional Details   [x] NMES:   [x] Guardian   [x] sEMG Location: Suprahyoids      [x] Skin assessment post-treatment:  [x]intact []redness- no adverse reaction       []redness - adverse reaction:       Patient was seen for 45 minutes for swallowing therapy in conjunction with NMES applied to the suprahyoids ( 15.0 mA ). Patient ingested thin liquids during muscle

## 2025-04-29 ENCOUNTER — APPOINTMENT (OUTPATIENT)
Facility: HOSPITAL | Age: 70
End: 2025-04-29
Attending: INTERNAL MEDICINE
Payer: MEDICARE

## 2025-05-01 ENCOUNTER — APPOINTMENT (OUTPATIENT)
Facility: HOSPITAL | Age: 70
End: 2025-05-01
Attending: INTERNAL MEDICINE
Payer: MEDICARE

## 2025-05-06 ENCOUNTER — HOSPITAL ENCOUNTER (OUTPATIENT)
Facility: HOSPITAL | Age: 70
Setting detail: RECURRING SERIES
Discharge: HOME | End: 2025-05-09
Attending: INTERNAL MEDICINE
Payer: MEDICARE

## 2025-05-06 PROCEDURE — 92526 ORAL FUNCTION THERAPY: CPT

## 2025-05-06 NOTE — PROGRESS NOTES
SPEECH LANGUAGE PATHOLOGY -  DAILY TREATMENT NOTE (updated 3/23)      Date: 2025          Patient Name:  Prabhakar Moran :  1955   Medical   Diagnosis:  Other dysphagia [R13.19] Treatment Diagnosis:  R13.12 Dysphagia, oropharyngeal phase and R13.13 Dysphagia, pharyngeal phase   Referral Source:  Houston Stevens MD Insurance:   Payor: [facility] MEDICARE / Plan: [facility] [employer]ALTHKEEPERS MEDIBLUE PLUS / Product Type: *No Product type* /                      Patient  verified yes     Visit #   Current  / Total 12    Time   In / Out 1530 1600   Total Treatment Time 30       SUBJECTIVE    Pain Level (0-10 scale): 0    Any medication changes, allergies to medications, adverse drug reactions, diagnosis change, or new procedure performed?: [x] No    [] Yes (see summary sheet for update)  Medications: Verified on Patient Summary List    Subjective functional status/changes:     Patient seen for  [facility]. He is accompanied by his spouse.   He has no new complaints at this time.       OBJECTIVE      Modality rationale: To achieve a contraction of the suprahyoid muscle while maintaining patient comfort paramount and to simultaneously pair swallowing with NMES stimulation.    Goal   of NMES: [] Maintain and strengthen muscle mass during inactive periods-NPO  [x] Maintain/gain ROM for optimal hyolaryngeal excursion and airway closure  [x] Facilitate voluntary motor control for swallowing maneuvers  [x] Increase sensory awareness  [x] Restore normal balance to system as ascending sensory information is reintegrated into movement patterns   Type Additional Details   [x] NMES:   [x] Guardian   [x] sEMG Location: Suprahyoids      [x] Skin assessment post-treatment:  [x]intact []redness- no adverse reaction       []redness - adverse reaction:       Swallow re-assessment completed today. Administered thin liquids, applesauce and solids to trial.    Patient completed x10 effortful swallows w/ thin liquid trials. Swallow

## 2025-05-13 ENCOUNTER — HOSPITAL ENCOUNTER (OUTPATIENT)
Facility: HOSPITAL | Age: 70
Setting detail: RECURRING SERIES
Discharge: HOME | End: 2025-05-16
Attending: INTERNAL MEDICINE
Payer: MEDICARE

## 2025-05-13 PROCEDURE — 92526 ORAL FUNCTION THERAPY: CPT

## 2025-05-13 NOTE — PROGRESS NOTES
ASCVD 8.5  Prescribed statin? Yes  Most recent LDL: 66 (4/27/2023).     [facility] [address], [address]  [place] [address]  Ph: [phone]     Fax: [phone]    SPEECH LANGUAGE PATHOLOGY PROGRESS NOTE  Patient Name:  Prabhakar Moran :  1955   Treatment/Medical Diagnosis: Other dysphagia [R13.19]   Referral Source:  Houston Stevens MD     Date of Initial Visit:  3/11/25 Attended Visits:  12 Missed Visits:  0     SUMMARY OF TREATMENT/ASSESSMENT:    Patient has been seen a total of 11 visits since initial evaluation and participated in  NMES tx. Swallowing progress continues. Patient reports improved tolerance of increased textures, improved bolus transit and reduced clinical indicators of penetration and aspiration. He continues to participate in NMES.  sEMG re-assessment showed an improved of 450 mV following 10 NMES treatments. Patient is compliant w/ home swallow exercises and diet modifications. He is currently consuming soft diet w/ mildly thick and thin liquids. He reports not difficulty.   Will initiate referral for repeat MBS to further assess swallow function and safety of oral intake.        CURRENT STATUS/GOALS:    MARÍA Farnsworth Dysphagia Inventory: 70%  (25)  Composite Score ranges from 20 ( extremely low functioning ) to 100 ( high functioning ).      JOSEPH Javad Dysphagia Inventory: 58.94% (3/11/25)  Composite Score ranges from 20 ( extremely low functioning ) to 100 ( high functioning ).      At the time of patient's initial evaluation 3/20/25, application of sEMG to the suprahyoid muscles revealed 500 mV of muscle activation. Re-assessment took place following 10 swallow/NMES treatment sessions. Data analysis revealed a 450 mV improvement in muscle firing (950 mV)        Short Term Goals: To be accomplished in 8-10 treatments.  -Recall compensatory swallow strategies independently  [] Met [] Not met [x] Partially met  Date: 25  -Recall aspiration precautions independently  [] Met [] Not met

## 2025-05-13 NOTE — PROGRESS NOTES
SPEECH LANGUAGE PATHOLOGY -  DAILY TREATMENT NOTE (updated 3/23)      Date: 2025          Patient Name:  Prabhakar Moran :  1955   Medical   Diagnosis:  Other dysphagia [R13.19] Treatment Diagnosis:  R13.12 Dysphagia, oropharyngeal phase and R13.13 Dysphagia, pharyngeal phase   Referral Source:  Houston Stevens MD Insurance:   Payor: [facility] MEDICARE / Plan: [facility] HEALTHKEEPERS MEDIBLUE PLUS / Product Type: *No Product type* /                      Patient  verified yes     Visit #   Current  / Total 13    Time   In / Out 1300 1345   Total Treatment Time 45       SUBJECTIVE    Pain Level (0-10 scale): 0    Any medication changes, allergies to medications, adverse drug reactions, diagnosis change, or new procedure performed?: [x] No    [] Yes (see summary sheet for update)  Medications: Verified on Patient Summary List    Subjective functional status/changes:     Patient seen for  [facility]. He is accompanied by his spouse.   He has no new complaints at this time.       OBJECTIVE      Modality rationale: To achieve a contraction of the suprahyoid muscle while maintaining patient comfort paramount and to simultaneously pair swallowing with NMES stimulation.    Goal   of NMES: [] Maintain and strengthen muscle mass during inactive periods-NPO  [x] Maintain/gain ROM for optimal hyolaryngeal excursion and airway closure  [x] Facilitate voluntary motor control for swallowing maneuvers  [x] Increase sensory awareness  [x] Restore normal balance to system as ascending sensory information is reintegrated into movement patterns   Type Additional Details   [x] NMES:   [x] Guardian   [x] sEMG Location: Suprahyoids      [x] Skin assessment post-treatment:  [x]intact []redness- no adverse reaction       []redness - adverse reaction:       Patient was seen for 45 minutes for swallowing therapy in conjunction with NMES applied to the suprahyoids ( 12.0 mA ). Patient ingested thin liquids during muscle

## 2025-05-14 DIAGNOSIS — R13.19 OTHER DYSPHAGIA: Primary | ICD-10-CM

## 2025-05-15 ENCOUNTER — HOSPITAL ENCOUNTER (OUTPATIENT)
Facility: HOSPITAL | Age: 70
Setting detail: RECURRING SERIES
Discharge: HOME | End: 2025-05-18
Attending: INTERNAL MEDICINE
Payer: MEDICARE

## 2025-05-15 PROCEDURE — 92526 ORAL FUNCTION THERAPY: CPT

## 2025-05-15 NOTE — PROGRESS NOTES
SPEECH LANGUAGE PATHOLOGY -  DAILY TREATMENT NOTE (updated 3/23)      Date: 5/15/2025          Patient Name:  Prabhakar Moran :  1955   Medical   Diagnosis:  Other dysphagia [R13.19] Treatment Diagnosis:  R13.12 Dysphagia, oropharyngeal phase and R13.13 Dysphagia, pharyngeal phase   Referral Source:  Houston Stevens MD Insurance:   Payor: [facility] MEDICARE / Plan: [facility] HEALTHKEEPERS MEDIBLUE PLUS / Product Type: *No Product type* /                      Patient  verified yes     Visit #   Current  / Total 14    Time   In / Out 1415 1500   Total Treatment Time 45       SUBJECTIVE    Pain Level (0-10 scale): 0    Any medication changes, allergies to medications, adverse drug reactions, diagnosis change, or new procedure performed?: [x] No    [] Yes (see summary sheet for update)  Medications: Verified on Patient Summary List    Subjective functional status/changes:     Patient seen for  [facility]. He is accompanied by his spouse.   He has no new complaints at this time.       OBJECTIVE      Modality rationale: To achieve a contraction of the suprahyoid muscle while maintaining patient comfort paramount and to simultaneously pair swallowing with NMES stimulation.    Goal   of NMES: [] Maintain and strengthen muscle mass during inactive periods-NPO  [x] Maintain/gain ROM for optimal hyolaryngeal excursion and airway closure  [x] Facilitate voluntary motor control for swallowing maneuvers  [x] Increase sensory awareness  [x] Restore normal balance to system as ascending sensory information is reintegrated into movement patterns   Type Additional Details   [x] NMES:   [x] Guardian   [x] sEMG Location: Suprahyoids      [x] Skin assessment post-treatment:  [x]intact []redness- no adverse reaction       []redness - adverse reaction:       Patient was seen for 45 minutes for swallowing therapy in conjunction with NMES-3 applied to the suprahyoids ( 17.5 mA ). Patient ingested thin liquids during muscle

## 2025-05-20 ENCOUNTER — HOSPITAL ENCOUNTER (INPATIENT)
Facility: HOSPITAL | Age: 70
LOS: 4 days | Discharge: HOME | DRG: 871 | End: 2025-05-25
Attending: EMERGENCY MEDICINE | Admitting: FAMILY MEDICINE
Payer: MEDICARE

## 2025-05-20 ENCOUNTER — APPOINTMENT (OUTPATIENT)
Facility: HOSPITAL | Age: 70
DRG: 871 | End: 2025-05-20
Payer: MEDICARE

## 2025-05-20 ENCOUNTER — HOSPITAL ENCOUNTER (OUTPATIENT)
Facility: HOSPITAL | Age: 70
Setting detail: RECURRING SERIES
Discharge: HOME | End: 2025-05-23
Attending: INTERNAL MEDICINE
Payer: MEDICARE

## 2025-05-20 DIAGNOSIS — J96.01 ACUTE RESPIRATORY FAILURE WITH HYPOXIA (HCC): ICD-10-CM

## 2025-05-20 DIAGNOSIS — A41.9 SEPTICEMIA (HCC): Primary | ICD-10-CM

## 2025-05-20 DIAGNOSIS — J18.9 PNEUMONIA OF RIGHT LUNG DUE TO INFECTIOUS ORGANISM, UNSPECIFIED PART OF LUNG: ICD-10-CM

## 2025-05-20 LAB
ALBUMIN SERPL-MCNC: 3.8 G/DL (ref 3.5–5)
ALBUMIN/GLOB SERPL: 1.1 (ref 1.1–2.2)
ALP SERPL-CCNC: 145 U/L (ref 45–117)
ALT SERPL-CCNC: 17 U/L (ref 12–78)
ANION GAP SERPL CALC-SCNC: 9 MMOL/L (ref 2–12)
AST SERPL W P-5'-P-CCNC: 18 U/L (ref 15–37)
BASOPHILS # BLD: 0.05 K/UL (ref 0–0.1)
BASOPHILS NFR BLD: 0.3 % (ref 0–1)
BILIRUB SERPL-MCNC: 1.2 MG/DL (ref 0.2–1)
BUN SERPL-MCNC: 10 MG/DL (ref 6–20)
BUN/CREAT SERPL: 10 (ref 12–20)
CA-I BLD-MCNC: 9.3 MG/DL (ref 8.5–10.1)
CHLORIDE SERPL-SCNC: 105 MMOL/L (ref 97–108)
CO2 SERPL-SCNC: 27 MMOL/L (ref 21–32)
CREAT SERPL-MCNC: 1 MG/DL (ref 0.7–1.3)
DIFFERENTIAL METHOD BLD: ABNORMAL
EOSINOPHIL # BLD: 0.35 K/UL (ref 0–0.4)
EOSINOPHIL NFR BLD: 2.3 % (ref 0–7)
ERYTHROCYTE [DISTWIDTH] IN BLOOD BY AUTOMATED COUNT: 12.3 % (ref 11.5–14.5)
FLUAV RNA SPEC QL NAA+PROBE: NOT DETECTED
FLUBV RNA SPEC QL NAA+PROBE: NOT DETECTED
GLOBULIN SER CALC-MCNC: 3.4 G/DL (ref 2–4)
GLUCOSE SERPL-MCNC: 101 MG/DL (ref 65–100)
HCT VFR BLD AUTO: 41.4 % (ref 36.6–50.3)
HGB BLD-MCNC: 14 G/DL (ref 12.1–17)
IMM GRANULOCYTES # BLD AUTO: 0.07 K/UL (ref 0–0.04)
IMM GRANULOCYTES NFR BLD AUTO: 0.5 % (ref 0–0.5)
LACTATE BLD-SCNC: 0.8 MMOL/L (ref 0.4–2)
LYMPHOCYTES # BLD: 1.36 K/UL (ref 0.8–3.5)
LYMPHOCYTES NFR BLD: 9 % (ref 12–49)
MCH RBC QN AUTO: 28.4 PG (ref 26–34)
MCHC RBC AUTO-ENTMCNC: 33.8 G/DL (ref 30–36.5)
MCV RBC AUTO: 84 FL (ref 80–99)
MONOCYTES # BLD: 0.86 K/UL (ref 0–1)
MONOCYTES NFR BLD: 5.7 % (ref 5–13)
NEUTS SEG # BLD: 12.48 K/UL (ref 1.8–8)
NEUTS SEG NFR BLD: 82.2 % (ref 32–75)
NRBC # BLD: 0 K/UL (ref 0–0.01)
NRBC BLD-RTO: 0 PER 100 WBC
PERFORMED BY:: NORMAL
PLATELET # BLD AUTO: 173 K/UL (ref 150–400)
PMV BLD AUTO: 9 FL (ref 8.9–12.9)
POTASSIUM SERPL-SCNC: 3.7 MMOL/L (ref 3.5–5.1)
PROCALCITONIN SERPL-MCNC: <0.05 NG/ML
PROT SERPL-MCNC: 7.2 G/DL (ref 6.4–8.2)
RBC # BLD AUTO: 4.93 M/UL (ref 4.1–5.7)
SARS-COV-2 RNA RESP QL NAA+PROBE: NOT DETECTED
SODIUM SERPL-SCNC: 141 MMOL/L (ref 136–145)
TROPONIN I SERPL HS-MCNC: 6 NG/L (ref 0–76)
WBC # BLD AUTO: 15.2 K/UL (ref 4.1–11.1)

## 2025-05-20 PROCEDURE — 99285 EMERGENCY DEPT VISIT HI MDM: CPT

## 2025-05-20 PROCEDURE — 85025 COMPLETE CBC W/AUTO DIFF WBC: CPT

## 2025-05-20 PROCEDURE — 83880 ASSAY OF NATRIURETIC PEPTIDE: CPT

## 2025-05-20 PROCEDURE — 83605 ASSAY OF LACTIC ACID: CPT

## 2025-05-20 PROCEDURE — 93005 ELECTROCARDIOGRAM TRACING: CPT | Performed by: EMERGENCY MEDICINE

## 2025-05-20 PROCEDURE — 96366 THER/PROPH/DIAG IV INF ADDON: CPT

## 2025-05-20 PROCEDURE — 80053 COMPREHEN METABOLIC PANEL: CPT

## 2025-05-20 PROCEDURE — 71275 CT ANGIOGRAPHY CHEST: CPT

## 2025-05-20 PROCEDURE — 6360000002 HC RX W HCPCS: Performed by: EMERGENCY MEDICINE

## 2025-05-20 PROCEDURE — 84484 ASSAY OF TROPONIN QUANT: CPT

## 2025-05-20 PROCEDURE — 36415 COLL VENOUS BLD VENIPUNCTURE: CPT

## 2025-05-20 PROCEDURE — 2580000003 HC RX 258: Performed by: EMERGENCY MEDICINE

## 2025-05-20 PROCEDURE — 6360000004 HC RX CONTRAST MEDICATION: Performed by: EMERGENCY MEDICINE

## 2025-05-20 PROCEDURE — 92526 ORAL FUNCTION THERAPY: CPT

## 2025-05-20 PROCEDURE — 71045 X-RAY EXAM CHEST 1 VIEW: CPT

## 2025-05-20 PROCEDURE — 87040 BLOOD CULTURE FOR BACTERIA: CPT

## 2025-05-20 PROCEDURE — 96365 THER/PROPH/DIAG IV INF INIT: CPT

## 2025-05-20 PROCEDURE — 96361 HYDRATE IV INFUSION ADD-ON: CPT

## 2025-05-20 PROCEDURE — 81001 URINALYSIS AUTO W/SCOPE: CPT

## 2025-05-20 PROCEDURE — 87636 SARSCOV2 & INF A&B AMP PRB: CPT

## 2025-05-20 PROCEDURE — 84145 PROCALCITONIN (PCT): CPT

## 2025-05-20 RX ORDER — 0.9 % SODIUM CHLORIDE 0.9 %
30 INTRAVENOUS SOLUTION INTRAVENOUS ONCE
Status: COMPLETED | OUTPATIENT
Start: 2025-05-20 | End: 2025-05-21

## 2025-05-20 RX ORDER — IOPAMIDOL 755 MG/ML
100 INJECTION, SOLUTION INTRAVASCULAR
Status: COMPLETED | OUTPATIENT
Start: 2025-05-20 | End: 2025-05-20

## 2025-05-20 RX ADMIN — IOPAMIDOL 100 ML: 755 INJECTION, SOLUTION INTRAVENOUS at 23:36

## 2025-05-20 RX ADMIN — SODIUM CHLORIDE 3129 ML: 0.9 INJECTION, SOLUTION INTRAVENOUS at 21:20

## 2025-05-20 RX ADMIN — CEFEPIME 2000 MG: 2 INJECTION, POWDER, FOR SOLUTION INTRAVENOUS at 21:19

## 2025-05-20 ASSESSMENT — PAIN - FUNCTIONAL ASSESSMENT: PAIN_FUNCTIONAL_ASSESSMENT: NONE - DENIES PAIN

## 2025-05-20 NOTE — PROGRESS NOTES
following 10 swallow/NMES treatment sessions. Data analysis revealed a 450 mV improvement in muscle firing (950 mV)      RECOMMENDATIONS/PLAN:   DIET: soft and bite size, thin liquids.  Ok to trial pizza and french fries .   Compensatory swallow strategies:Bolus hold, hard swallow, cough/throat clear after swallow, slow rate  Aspiration precautions:                             [x] Yes     [] No  GERD precautions :                                   [x] Yes     [] No  Videoflouroscopy:                                      [x] Yes     [] No   HEP/Handouts given:   Home Exercise Program of swallowing exercises       Patient will continue to benefit from skilled SLP services to analyze and address swallowing deficits to address functional deficits and attain remaining goals.    JOSEPH Javad Dysphagia Inventory: 70%  (5/13/25)  Composite Score ranges from 20 ( extremely low functioning ) to 100 ( high functioning ).        [facility] Dysphagia Inventory: 58.94% (3/11/25)  Composite Score ranges from 20 ( extremely low functioning ) to 100 ( high functioning ).      At the time of patient's initial evaluation 3/20/25, application of sEMG to the suprahyoid muscles revealed 500 mV of muscle activation. Re-assessment took place following 10 swallow/NMES treatment sessions. Data analysis revealed a 450 mV improvement in muscle firing (950 mV)          Short Term Goals: To be accomplished in 8-10 treatments.  -Recall compensatory swallow strategies independently  [] Met [] Not met [x] Partially met  Date: 5/13/25  -Recall aspiration precautions independently  [] Met [] Not met [x] Partially met  Date: 5/13/25  -Patient will tolerate small controlled sips of thin liquids using compensatory swallow strategies w/o clinical indicators of penetration/aspiration on 8/10 swallows   [x] Met [] Not met [] Partially met  Date: 5/6/25  -Participate in NMES Protocol   [] Met [] Not met [x] Partially met  Date: 11/20 completed  -improve

## 2025-05-21 ENCOUNTER — APPOINTMENT (OUTPATIENT)
Facility: HOSPITAL | Age: 70
DRG: 871 | End: 2025-05-21
Payer: MEDICARE

## 2025-05-21 PROBLEM — J84.9 ACUTE INTERSTITIAL PNEUMONIA (HCC): Status: ACTIVE | Noted: 2025-05-21

## 2025-05-21 LAB
ALBUMIN SERPL-MCNC: 3 G/DL (ref 3.5–5)
ALBUMIN/GLOB SERPL: 0.8 (ref 1.1–2.2)
ALP SERPL-CCNC: 119 U/L (ref 45–117)
ALT SERPL-CCNC: 16 U/L (ref 12–78)
ANION GAP SERPL CALC-SCNC: 8 MMOL/L (ref 2–12)
APPEARANCE UR: CLEAR
AST SERPL W P-5'-P-CCNC: 10 U/L (ref 15–37)
BACTERIA URNS QL MICRO: NEGATIVE /HPF
BILIRUB SERPL-MCNC: 0.9 MG/DL (ref 0.2–1)
BILIRUB UR QL: NEGATIVE
BNP SERPL-MCNC: 134 PG/ML
BUN SERPL-MCNC: 10 MG/DL (ref 6–20)
BUN/CREAT SERPL: 12 (ref 12–20)
CA-I BLD-MCNC: 8.7 MG/DL (ref 8.5–10.1)
CHLORIDE SERPL-SCNC: 111 MMOL/L (ref 97–108)
CO2 SERPL-SCNC: 23 MMOL/L (ref 21–32)
COLOR UR: ABNORMAL
CREAT SERPL-MCNC: 0.84 MG/DL (ref 0.7–1.3)
EKG ATRIAL RATE: 115 BPM
EKG DIAGNOSIS: NORMAL
EKG P AXIS: 56 DEGREES
EKG P-R INTERVAL: 164 MS
EKG Q-T INTERVAL: 326 MS
EKG QRS DURATION: 90 MS
EKG QTC CALCULATION (BAZETT): 450 MS
EKG R AXIS: -2 DEGREES
EKG T AXIS: 44 DEGREES
EKG VENTRICULAR RATE: 115 BPM
GLOBULIN SER CALC-MCNC: 3.6 G/DL (ref 2–4)
GLUCOSE SERPL-MCNC: 163 MG/DL (ref 65–100)
GLUCOSE UR STRIP.AUTO-MCNC: NEGATIVE MG/DL
HGB UR QL STRIP: NEGATIVE
KETONES UR QL STRIP.AUTO: NEGATIVE MG/DL
LEUKOCYTE ESTERASE UR QL STRIP.AUTO: NEGATIVE
MUCOUS THREADS URNS QL MICRO: NEGATIVE /LPF
NITRITE UR QL STRIP.AUTO: NEGATIVE
PH UR STRIP: 7
POTASSIUM SERPL-SCNC: 3.6 MMOL/L (ref 3.5–5.1)
PROT SERPL-MCNC: 6.6 G/DL (ref 6.4–8.2)
PROT UR STRIP-MCNC: NEGATIVE MG/DL
RBC #/AREA URNS HPF: ABNORMAL /HPF (ref 0–5)
SODIUM SERPL-SCNC: 142 MMOL/L (ref 136–145)
SP GR UR REFRACTOMETRY: <1.005 (ref 1–1.03)
URINE CULTURE IF INDICATED: ABNORMAL
UROBILINOGEN UR QL STRIP.AUTO: 0.1 EU/DL (ref 0.2–1)
WBC URNS QL MICRO: ABNORMAL /HPF (ref 0–4)

## 2025-05-21 PROCEDURE — 2060000000 HC ICU INTERMEDIATE R&B

## 2025-05-21 PROCEDURE — 2580000003 HC RX 258: Performed by: FAMILY MEDICINE

## 2025-05-21 PROCEDURE — 96375 TX/PRO/DX INJ NEW DRUG ADDON: CPT

## 2025-05-21 PROCEDURE — 80053 COMPREHEN METABOLIC PANEL: CPT

## 2025-05-21 PROCEDURE — 6360000002 HC RX W HCPCS: Performed by: FAMILY MEDICINE

## 2025-05-21 PROCEDURE — 74230 X-RAY XM SWLNG FUNCJ C+: CPT

## 2025-05-21 PROCEDURE — 6370000000 HC RX 637 (ALT 250 FOR IP): Performed by: FAMILY MEDICINE

## 2025-05-21 PROCEDURE — 6370000000 HC RX 637 (ALT 250 FOR IP): Performed by: EMERGENCY MEDICINE

## 2025-05-21 PROCEDURE — 6370000000 HC RX 637 (ALT 250 FOR IP): Performed by: INTERNAL MEDICINE

## 2025-05-21 PROCEDURE — 2500000003 HC RX 250 WO HCPCS: Performed by: FAMILY MEDICINE

## 2025-05-21 PROCEDURE — 2500000003 HC RX 250 WO HCPCS: Performed by: INTERNAL MEDICINE

## 2025-05-21 PROCEDURE — 2700000000 HC OXYGEN THERAPY PER DAY

## 2025-05-21 PROCEDURE — 6360000002 HC RX W HCPCS: Performed by: EMERGENCY MEDICINE

## 2025-05-21 PROCEDURE — 94640 AIRWAY INHALATION TREATMENT: CPT

## 2025-05-21 PROCEDURE — 92611 MOTION FLUOROSCOPY/SWALLOW: CPT

## 2025-05-21 PROCEDURE — 94761 N-INVAS EAR/PLS OXIMETRY MLT: CPT

## 2025-05-21 RX ORDER — ACETAMINOPHEN 650 MG/1
650 SUPPOSITORY RECTAL EVERY 6 HOURS PRN
Status: DISCONTINUED | OUTPATIENT
Start: 2025-05-21 | End: 2025-05-25 | Stop reason: HOSPADM

## 2025-05-21 RX ORDER — ALBUTEROL SULFATE 90 UG/1
2 INHALANT RESPIRATORY (INHALATION) 4 TIMES DAILY PRN
Status: DISCONTINUED | OUTPATIENT
Start: 2025-05-21 | End: 2025-05-25 | Stop reason: HOSPADM

## 2025-05-21 RX ORDER — METHYLPREDNISOLONE SODIUM SUCCINATE 40 MG/ML
40 INJECTION INTRAMUSCULAR; INTRAVENOUS EVERY 6 HOURS
Status: DISCONTINUED | OUTPATIENT
Start: 2025-05-21 | End: 2025-05-24

## 2025-05-21 RX ORDER — ONDANSETRON 4 MG/1
4 TABLET, ORALLY DISINTEGRATING ORAL EVERY 8 HOURS PRN
Status: DISCONTINUED | OUTPATIENT
Start: 2025-05-21 | End: 2025-05-25 | Stop reason: HOSPADM

## 2025-05-21 RX ORDER — MAGNESIUM SULFATE IN WATER 40 MG/ML
2000 INJECTION, SOLUTION INTRAVENOUS PRN
Status: DISCONTINUED | OUTPATIENT
Start: 2025-05-21 | End: 2025-05-25 | Stop reason: HOSPADM

## 2025-05-21 RX ORDER — ASPIRIN 81 MG/1
81 TABLET, CHEWABLE ORAL DAILY
Status: DISCONTINUED | OUTPATIENT
Start: 2025-05-21 | End: 2025-05-25 | Stop reason: HOSPADM

## 2025-05-21 RX ORDER — BUDESONIDE AND FORMOTEROL FUMARATE DIHYDRATE 160; 4.5 UG/1; UG/1
2 AEROSOL RESPIRATORY (INHALATION)
Status: DISCONTINUED | OUTPATIENT
Start: 2025-05-21 | End: 2025-05-25 | Stop reason: HOSPADM

## 2025-05-21 RX ORDER — AMLODIPINE BESYLATE 5 MG/1
10 TABLET ORAL
Status: DISCONTINUED | OUTPATIENT
Start: 2025-05-21 | End: 2025-05-25 | Stop reason: HOSPADM

## 2025-05-21 RX ORDER — ACETAMINOPHEN 325 MG/1
650 TABLET ORAL EVERY 6 HOURS PRN
Status: DISCONTINUED | OUTPATIENT
Start: 2025-05-21 | End: 2025-05-25 | Stop reason: HOSPADM

## 2025-05-21 RX ORDER — POLYETHYLENE GLYCOL 3350 17 G/17G
17 POWDER, FOR SOLUTION ORAL DAILY PRN
Status: DISCONTINUED | OUTPATIENT
Start: 2025-05-21 | End: 2025-05-25 | Stop reason: HOSPADM

## 2025-05-21 RX ORDER — METHYLPREDNISOLONE SODIUM SUCCINATE 40 MG/ML
40 INJECTION INTRAMUSCULAR; INTRAVENOUS ONCE
Status: COMPLETED | OUTPATIENT
Start: 2025-05-21 | End: 2025-05-21

## 2025-05-21 RX ORDER — FLUTICASONE PROPIONATE 50 MCG
1 SPRAY, SUSPENSION (ML) NASAL DAILY PRN
Status: DISCONTINUED | OUTPATIENT
Start: 2025-05-21 | End: 2025-05-25 | Stop reason: HOSPADM

## 2025-05-21 RX ORDER — IPRATROPIUM BROMIDE AND ALBUTEROL SULFATE 2.5; .5 MG/3ML; MG/3ML
1 SOLUTION RESPIRATORY (INHALATION)
Status: DISCONTINUED | OUTPATIENT
Start: 2025-05-21 | End: 2025-05-21

## 2025-05-21 RX ORDER — IPRATROPIUM BROMIDE AND ALBUTEROL SULFATE 2.5; .5 MG/3ML; MG/3ML
3 SOLUTION RESPIRATORY (INHALATION)
Status: COMPLETED | OUTPATIENT
Start: 2025-05-21 | End: 2025-05-21

## 2025-05-21 RX ORDER — ONDANSETRON 2 MG/ML
4 INJECTION INTRAMUSCULAR; INTRAVENOUS EVERY 6 HOURS PRN
Status: DISCONTINUED | OUTPATIENT
Start: 2025-05-21 | End: 2025-05-25 | Stop reason: HOSPADM

## 2025-05-21 RX ORDER — SODIUM CHLORIDE 9 MG/ML
INJECTION, SOLUTION INTRAVENOUS CONTINUOUS
Status: DISCONTINUED | OUTPATIENT
Start: 2025-05-21 | End: 2025-05-25 | Stop reason: HOSPADM

## 2025-05-21 RX ORDER — SODIUM CHLORIDE 9 MG/ML
INJECTION, SOLUTION INTRAVENOUS PRN
Status: DISCONTINUED | OUTPATIENT
Start: 2025-05-21 | End: 2025-05-25 | Stop reason: HOSPADM

## 2025-05-21 RX ORDER — IPRATROPIUM BROMIDE AND ALBUTEROL SULFATE 2.5; .5 MG/3ML; MG/3ML
1 SOLUTION RESPIRATORY (INHALATION)
Status: DISCONTINUED | OUTPATIENT
Start: 2025-05-21 | End: 2025-05-23

## 2025-05-21 RX ORDER — POTASSIUM CHLORIDE 1500 MG/1
40 TABLET, EXTENDED RELEASE ORAL PRN
Status: DISCONTINUED | OUTPATIENT
Start: 2025-05-21 | End: 2025-05-25 | Stop reason: HOSPADM

## 2025-05-21 RX ORDER — VITAMIN B COMPLEX
1000 TABLET ORAL DAILY
Status: DISCONTINUED | OUTPATIENT
Start: 2025-05-21 | End: 2025-05-25 | Stop reason: HOSPADM

## 2025-05-21 RX ORDER — SODIUM CHLORIDE 0.9 % (FLUSH) 0.9 %
5-40 SYRINGE (ML) INJECTION EVERY 12 HOURS SCHEDULED
Status: DISCONTINUED | OUTPATIENT
Start: 2025-05-21 | End: 2025-05-25 | Stop reason: HOSPADM

## 2025-05-21 RX ORDER — POTASSIUM CHLORIDE 7.45 MG/ML
10 INJECTION INTRAVENOUS PRN
Status: DISCONTINUED | OUTPATIENT
Start: 2025-05-21 | End: 2025-05-25 | Stop reason: HOSPADM

## 2025-05-21 RX ORDER — SODIUM CHLORIDE 0.9 % (FLUSH) 0.9 %
5-40 SYRINGE (ML) INJECTION PRN
Status: DISCONTINUED | OUTPATIENT
Start: 2025-05-21 | End: 2025-05-25 | Stop reason: HOSPADM

## 2025-05-21 RX ORDER — ENOXAPARIN SODIUM 100 MG/ML
30 INJECTION SUBCUTANEOUS 2 TIMES DAILY
Status: DISCONTINUED | OUTPATIENT
Start: 2025-05-21 | End: 2025-05-25 | Stop reason: HOSPADM

## 2025-05-21 RX ADMIN — IPRATROPIUM BROMIDE AND ALBUTEROL SULFATE 1 DOSE: 2.5; .5 SOLUTION RESPIRATORY (INHALATION) at 21:06

## 2025-05-21 RX ADMIN — PIPERACILLIN AND TAZOBACTAM 4500 MG: 4; .5 INJECTION, POWDER, LYOPHILIZED, FOR SOLUTION INTRAVENOUS at 03:09

## 2025-05-21 RX ADMIN — SODIUM CHLORIDE: 0.9 INJECTION, SOLUTION INTRAVENOUS at 03:32

## 2025-05-21 RX ADMIN — Medication 1000 UNITS: at 09:14

## 2025-05-21 RX ADMIN — AMLODIPINE BESYLATE 10 MG: 5 TABLET ORAL at 20:19

## 2025-05-21 RX ADMIN — Medication 2 PUFF: at 21:06

## 2025-05-21 RX ADMIN — AMLODIPINE BESYLATE 10 MG: 5 TABLET ORAL at 03:05

## 2025-05-21 RX ADMIN — BARIUM SULFATE 50 ML: 0.81 POWDER, FOR SUSPENSION ORAL at 11:30

## 2025-05-21 RX ADMIN — ENOXAPARIN SODIUM 30 MG: 100 INJECTION SUBCUTANEOUS at 09:14

## 2025-05-21 RX ADMIN — SODIUM CHLORIDE: 0.9 INJECTION, SOLUTION INTRAVENOUS at 23:44

## 2025-05-21 RX ADMIN — PIPERACILLIN AND TAZOBACTAM 3375 MG: 3; .375 INJECTION, POWDER, LYOPHILIZED, FOR SOLUTION INTRAVENOUS at 09:22

## 2025-05-21 RX ADMIN — SODIUM CHLORIDE, PRESERVATIVE FREE 10 ML: 5 INJECTION INTRAVENOUS at 09:14

## 2025-05-21 RX ADMIN — BARIUM SULFATE 5 ML: 400 PASTE ORAL at 11:30

## 2025-05-21 RX ADMIN — METHYLPREDNISOLONE SODIUM SUCCINATE 40 MG: 40 INJECTION INTRAMUSCULAR; INTRAVENOUS at 09:14

## 2025-05-21 RX ADMIN — METHYLPREDNISOLONE SODIUM SUCCINATE 40 MG: 40 INJECTION INTRAMUSCULAR; INTRAVENOUS at 00:14

## 2025-05-21 RX ADMIN — PIPERACILLIN AND TAZOBACTAM 3375 MG: 3; .375 INJECTION, POWDER, LYOPHILIZED, FOR SOLUTION INTRAVENOUS at 17:09

## 2025-05-21 RX ADMIN — ACETAMINOPHEN 650 MG: 325 TABLET ORAL at 03:06

## 2025-05-21 RX ADMIN — ENOXAPARIN SODIUM 30 MG: 100 INJECTION SUBCUTANEOUS at 20:13

## 2025-05-21 RX ADMIN — Medication 2 PUFF: at 07:07

## 2025-05-21 RX ADMIN — BARIUM SULFATE 20 ML: 400 SUSPENSION ORAL at 11:30

## 2025-05-21 RX ADMIN — PIPERACILLIN AND TAZOBACTAM 3375 MG: 3; .375 INJECTION, POWDER, LYOPHILIZED, FOR SOLUTION INTRAVENOUS at 23:45

## 2025-05-21 RX ADMIN — ASPIRIN 81 MG: 81 TABLET, CHEWABLE ORAL at 09:14

## 2025-05-21 RX ADMIN — BARIUM SULFATE 5 ML: 400 SUSPENSION ORAL at 11:30

## 2025-05-21 RX ADMIN — SODIUM CHLORIDE, PRESERVATIVE FREE 10 ML: 5 INJECTION INTRAVENOUS at 20:19

## 2025-05-21 RX ADMIN — IPRATROPIUM BROMIDE AND ALBUTEROL SULFATE 1 DOSE: 2.5; .5 SOLUTION RESPIRATORY (INHALATION) at 13:58

## 2025-05-21 RX ADMIN — METHYLPREDNISOLONE SODIUM SUCCINATE 40 MG: 40 INJECTION INTRAMUSCULAR; INTRAVENOUS at 20:13

## 2025-05-21 RX ADMIN — METHYLPREDNISOLONE SODIUM SUCCINATE 40 MG: 40 INJECTION INTRAMUSCULAR; INTRAVENOUS at 14:50

## 2025-05-21 RX ADMIN — IPRATROPIUM BROMIDE AND ALBUTEROL SULFATE 3 DOSE: 2.5; .5 SOLUTION RESPIRATORY (INHALATION) at 00:10

## 2025-05-21 RX ADMIN — AZITHROMYCIN MONOHYDRATE 500 MG: 500 INJECTION, POWDER, LYOPHILIZED, FOR SOLUTION INTRAVENOUS at 03:43

## 2025-05-21 RX ADMIN — IPRATROPIUM BROMIDE AND ALBUTEROL SULFATE 1 DOSE: 2.5; .5 SOLUTION RESPIRATORY (INHALATION) at 07:07

## 2025-05-21 ASSESSMENT — PAIN SCALES - GENERAL
PAINLEVEL_OUTOF10: 3
PAINLEVEL_OUTOF10: 0

## 2025-05-21 NOTE — H&P
Hospitalist Admission Note    NAME: Prabhakar Moran   :  1955   MRN:  [id number]     Date/Time:  2025 1:04 AM    Patient PCP: Houston Stevens MD    ______________________________________________________________________  Given the patient's current clinical presentation, I have a high level of concern for decompensation if discharged from the emergency department.  Complex decision making was performed, which includes reviewing the patient's available past medical records, laboratory results, and x-ray films.       My assessment of this patient's clinical condition and my plan of care is as follows.    Assessment / Plan:    Acute hypoxic respiratory failure  Gram-negative pneumonia  Acute exacerbation of COPD  Dysphagia    Admit to medical telemetry floor after blood cultures sputum culture start IV Zosyn and Zithromax start on nebulizer treatment and IV Solu-Medrol pulmonary consult                        Medical Decision Making:   I personally reviewed labs: CBC CMP  I personally reviewed imaging: CT scan  Toxic drug monitoring: None  Discussed case with: ED provider. After discussion I am in agreement that acuity of patient's medical condition necessitates hospital stay.      Code Status: Full code  DVT Prophylaxis: Lovenox  GI Prophylaxis: None      Subjective:   CHIEF COMPLAINT: Shortness of breath    HISTORY OF PRESENT ILLNESS:     Prabhakar Moran is a 69 y.o.  male with PMHx significant for COPD hypertension GERD came to emergency room complaining of shortness of breath started this morning shortness of breath on little exertion generalized weakness unable to breathe came to emergency room seen by the ER physician workup done in the ER blood work shows leukocytosis BNP was 134 chest x-ray was negative but CT scan of the chest shows right lower lobe pneumonia patient was recommend to be admitted for further workup and treatment patient was hypoxic placed on 2 L oxygen by nasal cannula and given

## 2025-05-21 NOTE — ED NOTES
ED TO INPATIENT SBAR HANDOFF    Patient Name: Prabhakar Moran   Preferred Name: Prabhakar  : 1955  69 y.o.   Family/Caregiver Present: no   Code Status Order: Full Code  PO Status: NPO:No  Telemetry Order: Yes  C-SSRS: Risk of Suicide: No Risk  Sitter no   Restraints:     Sepsis Risk Score Sepsis V2 Risk Score: 19.6    Situation  Chief Complaint   Patient presents with    Shortness of Breath     Brief Description of Patient's Condition: pt came in for SOB starting around 5pm last night and generalized weakness.   Mental Status: oriented and alert  Arrived from:Home  Imaging:   CTA CHEST W WO CONTRAST   Final Result   There is no pulmonary embolism.   There is no aortic aneurysm.   Right lower lobe atelectasis/pneumonia.       Incidental findings are as described above.          Electronically signed by MAREN BONNER      XR CHEST PORTABLE   Final Result      No acute process on portable chest.         Electronically signed by HARITHA MONTES DE OCA        Abnormal labs:   Abnormal Labs Reviewed   CBC WITH AUTO DIFFERENTIAL - Abnormal; Notable for the following components:       Result Value    WBC 15.2 (*)     Neutrophils % 82.2 (*)     Lymphocytes % 9.0 (*)     Neutrophils Absolute 12.48 (*)     Immature Granulocytes Absolute 0.07 (*)     All other components within normal limits   COMPREHENSIVE METABOLIC PANEL - Abnormal; Notable for the following components:    Glucose 101 (*)     BUN/Creatinine Ratio 10 (*)     Total Bilirubin 1.2 (*)     Alk Phosphatase 145 (*)     All other components within normal limits   PROCALCITONIN - Abnormal; Notable for the following components:    Procalcitonin <0.05 (*)     All other components within normal limits   URINALYSIS WITH REFLEX TO CULTURE - Abnormal; Notable for the following components:    Urobilinogen, Urine 0.1 (*)     All other components within normal limits   BRAIN NATRIURETIC PEPTIDE - Abnormal; Notable for the following components:    NT Pro- (*)     All other

## 2025-05-21 NOTE — CONSULTS
reformatted imaging was performed. Sagittal and coronal reformatting. 3-D Postprocessing of imaging was performed. 3-D MIP reconstructed images were obtained in the coronal plane.  FINDINGS: PE: This is a good quality study for the evaluation of pulmonary embolism to the subsegmental arterial level. There is no pulmonary embolism to this level. CORONARY VASCULAR CALCIFICATIONS: None There is no aortic aneurysm. Right lower lobe atelectasis/pneumonia with mucous plugging in the right lower lobe. Enlarged thyroid gland on the right is unchanged. Pneumobilia is redemonstrated. There is no mediastinal, axillary or hilar lymphadenopathy. The proximal pulmonary arteries are without evidence of filling defects. No acute fracture is identified. The remainder of the upper abdomen visualized is unremarkable.     There is no pulmonary embolism. There is no aortic aneurysm. Right lower lobe atelectasis/pneumonia.  Incidental findings are as described above. Electronically signed by MAREN BONNER    XR CHEST PORTABLE  Result Date: 5/20/2025  EXAM:  XR CHEST PORTABLE INDICATION: Shortness of breath COMPARISON: 12/14/2024 TECHNIQUE: portable chest AP view FINDINGS: The cardiac silhouette is within normal limits. The pulmonary vasculature is within normal limits. The lungs and pleural spaces are clear. The visualized bones and upper abdomen are age-appropriate.     No acute process on portable chest. Electronically signed by HARITHA MONTES DE OCA       This care involved high complexity decision making which includes independently reviewing the patient's past medical records, current laboratory results, medication profiles that were immediately available to me and actual Xray images at the bedside in order to assess, support vital system function, and to treat this degree of vital organ system failure, and to prevent further life threatening deterioration of the patient’s condition.     Medical Decision Making Today  Reviewed the flowsheet

## 2025-05-21 NOTE — ED PROVIDER NOTES
[facility] EMERGENCY DEPT  EMERGENCY DEPARTMENT HISTORY AND PHYSICAL EXAM      Date of evaluation: 5/20/2025  Patient Name: Prabhakar Moran  Birthdate 1955  MRN: [id number]  ED Provider: Pasquale Conteh MD   Note Started: 8:55 PM EDT 5/20/25    HISTORY OF PRESENT ILLNESS     Chief Complaint   Patient presents with    Shortness of Breath       History Provided By: Patient, only     HPI: Prabhakar Moran is a 69 y.o. male with known past medical history significant for asthma presents with increasing shortness of breath that began around 5:00 today.  He also states that the patient had a low-grade fever.  Sepsis alert was called.  Patient just says that he felt continuously fatigued.  Denies any chest pain, rash, diarrhea, headache, night sweats.  He has not treated with anything and is failed to improve.    PAST MEDICAL HISTORY   Past Medical History:  Past Medical History:   Diagnosis Date    Asthma     Benign essential hypertension     Bronchiectasis without complication (HCC)     Elevated fasting blood sugar     Eosinophilia, unspecified type     GERD (gastroesophageal reflux disease)     Hypercholesterolemia     Hypertension     Mild anemia     Multinodular goiter     Obesity (BMI 30.0-34.9)     Thrombocytopenia     Thrombocytopenia     Thrombocytopenia 01/14/2023    Thyroid nodule     Vitamin D deficiency        Past Surgical History:  Past Surgical History:   Procedure Laterality Date    IR GUIDED FNA  11/2/2022       Family History:  Family History   Problem Relation Age of Onset    High Blood Pressure Father     Hypertension Father        Social History:  Social History     Tobacco Use    Smoking status: Never    Smokeless tobacco: Never   Vaping Use    Vaping status: Never Used   Substance Use Topics    Alcohol use: Never    Drug use: Never       Allergies:  No Known Allergies    PCP: Houston Stevens MD    Current Meds:   Current Facility-Administered Medications   Medication Dose Route Frequency Provider Last

## 2025-05-21 NOTE — PLAN OF CARE
SPEECH PATHOLOGY MODIFIED BARIUM SWALLOW STUDY  Patient: Prabhakar Moran (69 y.o. male)  Date: 5/21/2025  Primary Diagnosis: Septicemia ([facility]) [A41.9]  Acute interstitial pneumonia (HCC) [J84.9]  Acute respiratory failure with hypoxia (HCC) [J96.01]  Pneumonia of right lung due to infectious organism, unspecified part of lung [J18.9]       Precautions: aspiration                     DIET RECOMMENDATIONS:Soft and bite sized and thin liquids    SWALLOW SAFETY PRECAUTIONS: 1:1 assistance with ALL PO intake, STRICT aspiration and GERD precautions, monitor pt closely for s/s aspiration, meds whole with applesauce or pudding, FEED ONLY IF AWAKE AND ALERT.      ASSESSMENT :  Based on the objective data described below, the patient presents with mild-moderate oropharyngeal dysphagia w/ silent aspiration of thin liquids.  Swallow function appears similar to last MBS completed 12/16/25.     Oral phase: reduced bolus control w/ premature spillage to the level of the pyriforms where swallow is initiated. Minimal swallow delay.     Pharyngeal phase: Hyolaryngeal excursion and protraction reduced w/ excursion > protraction. Intermittent full epiglottic inversion w/ weight of consistency aiding in completed inversion; however reduced timing. Reduced pharyngeal constriction.   There is penetration of thin via cup before the swallow that leads to silent aspiration. Silent aspiration is only observed on 1/3 thin liquid trials via cup. There is penetration of thin and chin tuck strategy.   There is trace penetration of mildly thick and moderately thick.  There is observed residue in the laryngeal vestibule at eventually contacts the vocal cords and patient is cued to elicit throat clear to clear.   No penetration or aspiration observed w/ remaining consistencies; however patient remains at risk due to residue in the pyriforms and vallecular space. Pharyngeal residue is more pronounced following thin liquid trials.     UES: reduced

## 2025-05-21 NOTE — CARE COORDINATION
05/21/25 1533   Service Assessment   Patient Orientation Alert and Oriented   Cognition Alert   History Provided By Medical Record   Primary Caregiver Self   Support Systems Spouse/Significant Other   Patient's Healthcare Decision Maker is: Legal Next of Kin   PCP Verified by CM No   Prior Functional Level Independent in ADLs/IADLs   Current Functional Level Independent in ADLs/IADLs   Can patient return to prior living arrangement Yes   Ability to make needs known: Good   Family able to assist with home care needs: Yes   Would you like for me to discuss the discharge plan with any other family members/significant others, and if so, who? No   Financial Resources Medicare   Community Resources None   CM/SW Referral Other (see comment)       CM attempted to meet with patient 3 times, patient off the floor, patient asleep, and at last attempt patients wife in room crying and requested CM return at a later time.    Assessment completed via chart review, appears patient lives at home with spouse, independent in ADL's, will likely return home on d/c,  CM to follow up at a later time to confirm.    CM continues to follow.

## 2025-05-22 LAB
BASOPHILS # BLD: 0.02 K/UL (ref 0–0.1)
BASOPHILS NFR BLD: 0.1 % (ref 0–1)
DIFFERENTIAL METHOD BLD: ABNORMAL
EOSINOPHIL # BLD: 0 K/UL (ref 0–0.4)
EOSINOPHIL NFR BLD: 0 % (ref 0–7)
ERYTHROCYTE [DISTWIDTH] IN BLOOD BY AUTOMATED COUNT: 12.4 % (ref 11.5–14.5)
GLUCOSE BLD STRIP.AUTO-MCNC: 137 MG/DL (ref 65–100)
HCT VFR BLD AUTO: 35.5 % (ref 36.6–50.3)
HGB BLD-MCNC: 11.7 G/DL (ref 12.1–17)
IMM GRANULOCYTES # BLD AUTO: 0.12 K/UL (ref 0–0.04)
IMM GRANULOCYTES NFR BLD AUTO: 0.8 % (ref 0–0.5)
LYMPHOCYTES # BLD: 0.63 K/UL (ref 0.8–3.5)
LYMPHOCYTES NFR BLD: 4.3 % (ref 12–49)
MCH RBC QN AUTO: 28.6 PG (ref 26–34)
MCHC RBC AUTO-ENTMCNC: 33 G/DL (ref 30–36.5)
MCV RBC AUTO: 86.8 FL (ref 80–99)
MONOCYTES # BLD: 0.55 K/UL (ref 0–1)
MONOCYTES NFR BLD: 3.7 % (ref 5–13)
NEUTS SEG # BLD: 13.45 K/UL (ref 1.8–8)
NEUTS SEG NFR BLD: 91.1 % (ref 32–75)
NRBC # BLD: 0 K/UL (ref 0–0.01)
NRBC BLD-RTO: 0 PER 100 WBC
PERFORMED BY:: ABNORMAL
PLATELET # BLD AUTO: 168 K/UL (ref 150–400)
PMV BLD AUTO: 9.9 FL (ref 8.9–12.9)
RBC # BLD AUTO: 4.09 M/UL (ref 4.1–5.7)
WBC # BLD AUTO: 14.8 K/UL (ref 4.1–11.1)

## 2025-05-22 PROCEDURE — 94761 N-INVAS EAR/PLS OXIMETRY MLT: CPT

## 2025-05-22 PROCEDURE — 92526 ORAL FUNCTION THERAPY: CPT

## 2025-05-22 PROCEDURE — 85025 COMPLETE CBC W/AUTO DIFF WBC: CPT

## 2025-05-22 PROCEDURE — 94669 MECHANICAL CHEST WALL OSCILL: CPT

## 2025-05-22 PROCEDURE — 2060000000 HC ICU INTERMEDIATE R&B

## 2025-05-22 PROCEDURE — 82962 GLUCOSE BLOOD TEST: CPT

## 2025-05-22 PROCEDURE — 2500000003 HC RX 250 WO HCPCS: Performed by: FAMILY MEDICINE

## 2025-05-22 PROCEDURE — 6370000000 HC RX 637 (ALT 250 FOR IP): Performed by: FAMILY MEDICINE

## 2025-05-22 PROCEDURE — 6360000002 HC RX W HCPCS: Performed by: FAMILY MEDICINE

## 2025-05-22 PROCEDURE — 36415 COLL VENOUS BLD VENIPUNCTURE: CPT

## 2025-05-22 PROCEDURE — 2580000003 HC RX 258: Performed by: FAMILY MEDICINE

## 2025-05-22 PROCEDURE — 6370000000 HC RX 637 (ALT 250 FOR IP): Performed by: INTERNAL MEDICINE

## 2025-05-22 PROCEDURE — 2700000000 HC OXYGEN THERAPY PER DAY

## 2025-05-22 PROCEDURE — 94640 AIRWAY INHALATION TREATMENT: CPT

## 2025-05-22 RX ORDER — IPRATROPIUM BROMIDE AND ALBUTEROL SULFATE 2.5; .5 MG/3ML; MG/3ML
1 SOLUTION RESPIRATORY (INHALATION)
Status: DISCONTINUED | OUTPATIENT
Start: 2025-05-22 | End: 2025-05-22 | Stop reason: SDUPTHER

## 2025-05-22 RX ADMIN — METHYLPREDNISOLONE SODIUM SUCCINATE 40 MG: 40 INJECTION INTRAMUSCULAR; INTRAVENOUS at 14:10

## 2025-05-22 RX ADMIN — AMLODIPINE BESYLATE 10 MG: 5 TABLET ORAL at 20:34

## 2025-05-22 RX ADMIN — METHYLPREDNISOLONE SODIUM SUCCINATE 40 MG: 40 INJECTION INTRAMUSCULAR; INTRAVENOUS at 08:19

## 2025-05-22 RX ADMIN — IPRATROPIUM BROMIDE AND ALBUTEROL SULFATE 1 DOSE: 2.5; .5 SOLUTION RESPIRATORY (INHALATION) at 08:13

## 2025-05-22 RX ADMIN — PIPERACILLIN AND TAZOBACTAM 3375 MG: 3; .375 INJECTION, POWDER, LYOPHILIZED, FOR SOLUTION INTRAVENOUS at 08:10

## 2025-05-22 RX ADMIN — Medication 2 PUFF: at 08:13

## 2025-05-22 RX ADMIN — SODIUM CHLORIDE, PRESERVATIVE FREE 10 ML: 5 INJECTION INTRAVENOUS at 20:39

## 2025-05-22 RX ADMIN — SODIUM CHLORIDE, PRESERVATIVE FREE 10 ML: 5 INJECTION INTRAVENOUS at 08:20

## 2025-05-22 RX ADMIN — Medication 1000 UNITS: at 08:19

## 2025-05-22 RX ADMIN — METHYLPREDNISOLONE SODIUM SUCCINATE 40 MG: 40 INJECTION INTRAMUSCULAR; INTRAVENOUS at 04:21

## 2025-05-22 RX ADMIN — IPRATROPIUM BROMIDE AND ALBUTEROL SULFATE 1 DOSE: 2.5; .5 SOLUTION RESPIRATORY (INHALATION) at 14:11

## 2025-05-22 RX ADMIN — AZITHROMYCIN MONOHYDRATE 500 MG: 500 INJECTION, POWDER, LYOPHILIZED, FOR SOLUTION INTRAVENOUS at 04:23

## 2025-05-22 RX ADMIN — ENOXAPARIN SODIUM 30 MG: 100 INJECTION SUBCUTANEOUS at 20:34

## 2025-05-22 RX ADMIN — PIPERACILLIN AND TAZOBACTAM 3375 MG: 3; .375 INJECTION, POWDER, LYOPHILIZED, FOR SOLUTION INTRAVENOUS at 16:10

## 2025-05-22 RX ADMIN — ENOXAPARIN SODIUM 30 MG: 100 INJECTION SUBCUTANEOUS at 09:00

## 2025-05-22 RX ADMIN — Medication 2 PUFF: at 21:14

## 2025-05-22 RX ADMIN — METHYLPREDNISOLONE SODIUM SUCCINATE 40 MG: 40 INJECTION INTRAMUSCULAR; INTRAVENOUS at 20:33

## 2025-05-22 RX ADMIN — PIPERACILLIN AND TAZOBACTAM 3375 MG: 3; .375 INJECTION, POWDER, LYOPHILIZED, FOR SOLUTION INTRAVENOUS at 23:52

## 2025-05-22 RX ADMIN — IPRATROPIUM BROMIDE AND ALBUTEROL SULFATE 1 DOSE: 2.5; .5 SOLUTION RESPIRATORY (INHALATION) at 21:14

## 2025-05-22 RX ADMIN — ASPIRIN 81 MG: 81 TABLET, CHEWABLE ORAL at 08:19

## 2025-05-22 ASSESSMENT — PAIN SCALES - GENERAL
PAINLEVEL_OUTOF10: 0

## 2025-05-22 NOTE — PLAN OF CARE
Speech LAnguage Pathology Dysphagia TREATMENT    Patient: Prabhakar Moran (69 y.o. male)  Date: 5/22/2025  Primary Diagnosis: Septicemia (HCC) [A41.9]  Acute interstitial pneumonia (HCC) [J84.9]  Acute respiratory failure with hypoxia (HCC) [J96.01]  Pneumonia of right lung due to infectious organism, unspecified part of lung [J18.9]       Precautions: aspiration                    Time In: 1030  Time Out: 1055    DIET RECOMMENDATIONS: Soft and bite sized and mildly thick liquids    SWALLOW SAFETY PRECAUTIONS: 1:1 assistance with ALL PO intake, STRICT aspiration and GERD precautions, monitor pt closely for s/s aspiration, meds whole with applesauce or pudding, FEED ONLY IF AWAKE AND ALERT.      ASSESSMENT :  Based on the objective data described below, the patient presents with oropharyngeal dysphagia as indicative by recent MBS results, silent aspiration and present PNA.   Patient seen at bedside. He reports tolerating present diet of SBS, mildly thick liquids.   Educated patient to MBS results, aspiration precautions and compensatory swallow strategies.   At this time, true etiology of dysphagia is unknown; however appears to be chronic in nature.   Patient has not had a MRI to date.   Patient participated in swallowing trials using compensatory swallow strategies w/ mildly thick liquids. Educated patient to use of small sips, bolus hold, effortful swallow, double swallow and throat clear. Patient w/ independent carry-over of strategies and no clinical indicators of penetration or aspiration observed w/ all mildly thick trials.   Patient tolerated soft solid trials w/o difficulty.   Handout provided on swallowing exercises. Patient completed each:   Jazmin, effortful swallow, Mendelson, supraglottic and shaker.     Recommend patient to continue w/ present diet orders. Aspiration precautions advised.     Patient will benefit from skilled intervention to address the above impairments.    GOALS:    Problem: SLP Adult

## 2025-05-22 NOTE — PLAN OF CARE
Problem: Discharge Planning  Goal: Discharge to home or other facility with appropriate resources  Outcome: Progressing  Flowsheets (Taken 5/21/2025 2020)  Discharge to home or other facility with appropriate resources:   Identify barriers to discharge with patient and caregiver   Arrange for needed discharge resources and transportation as appropriate   Identify discharge learning needs (meds, wound care, etc)   Arrange for interpreters to assist at discharge as needed   Refer to discharge planning if patient needs post-hospital services based on physician order or complex needs related to functional status, cognitive ability or social support system     Problem: SLP Adult - Impaired Swallowing  Goal: By Discharge: Advance to least restrictive diet without signs or symptoms of aspiration for planned discharge setting.  See evaluation for individualized goals.  Description: Speech Therapy Swallow Goals  Initiated 5/21/25  -Patient will tolerate Soft and bite size diet with mildly thick liquids without clinical indicators of aspiration given min cues within 7 day(s).        -Patient will tolerate PO trials without clinical indicators of aspiration given min cues within 7 day(s).      -Patient will participate in modified barium swallow study within 7 day(s).      -Patient will demonstrate understanding of swallow safety precautions and aspiration precautions, diet recs with min cues within 7day(s).    -Patient/caregiver goal: to swallow safely    5/21/2025 1207 by Muriel Gong, SLP  Outcome: Progressing     Problem: ABCDS Injury Assessment  Goal: Absence of physical injury  Outcome: Progressing     Problem: Safety - Adult  Goal: Free from fall injury  Outcome: Progressing     Problem: Pain  Goal: Verbalizes/displays adequate comfort level or baseline comfort level  Outcome: Progressing

## 2025-05-23 LAB
GLUCOSE BLD STRIP.AUTO-MCNC: 126 MG/DL (ref 65–100)
GLUCOSE BLD STRIP.AUTO-MCNC: 133 MG/DL (ref 65–100)
GLUCOSE BLD STRIP.AUTO-MCNC: 193 MG/DL (ref 65–100)
PERFORMED BY:: ABNORMAL

## 2025-05-23 PROCEDURE — 94669 MECHANICAL CHEST WALL OSCILL: CPT

## 2025-05-23 PROCEDURE — 94761 N-INVAS EAR/PLS OXIMETRY MLT: CPT

## 2025-05-23 PROCEDURE — 94010 BREATHING CAPACITY TEST: CPT

## 2025-05-23 PROCEDURE — 6360000002 HC RX W HCPCS: Performed by: FAMILY MEDICINE

## 2025-05-23 PROCEDURE — 94640 AIRWAY INHALATION TREATMENT: CPT

## 2025-05-23 PROCEDURE — 2500000003 HC RX 250 WO HCPCS: Performed by: FAMILY MEDICINE

## 2025-05-23 PROCEDURE — 6370000000 HC RX 637 (ALT 250 FOR IP): Performed by: FAMILY MEDICINE

## 2025-05-23 PROCEDURE — 82962 GLUCOSE BLOOD TEST: CPT

## 2025-05-23 PROCEDURE — 6370000000 HC RX 637 (ALT 250 FOR IP): Performed by: INTERNAL MEDICINE

## 2025-05-23 PROCEDURE — 2060000000 HC ICU INTERMEDIATE R&B

## 2025-05-23 PROCEDURE — 2580000003 HC RX 258: Performed by: FAMILY MEDICINE

## 2025-05-23 PROCEDURE — 6360000002 HC RX W HCPCS: Performed by: INTERNAL MEDICINE

## 2025-05-23 RX ORDER — METOCLOPRAMIDE 10 MG/1
10 TABLET ORAL
Status: DISCONTINUED | OUTPATIENT
Start: 2025-05-23 | End: 2025-05-25 | Stop reason: HOSPADM

## 2025-05-23 RX ORDER — ACETYLCYSTEINE 200 MG/ML
600 SOLUTION ORAL; RESPIRATORY (INHALATION)
Status: DISCONTINUED | OUTPATIENT
Start: 2025-05-23 | End: 2025-05-25

## 2025-05-23 RX ORDER — IPRATROPIUM BROMIDE AND ALBUTEROL SULFATE 2.5; .5 MG/3ML; MG/3ML
1 SOLUTION RESPIRATORY (INHALATION)
Status: DISCONTINUED | OUTPATIENT
Start: 2025-05-23 | End: 2025-05-24

## 2025-05-23 RX ADMIN — PIPERACILLIN AND TAZOBACTAM 3375 MG: 3; .375 INJECTION, POWDER, LYOPHILIZED, FOR SOLUTION INTRAVENOUS at 08:10

## 2025-05-23 RX ADMIN — AMLODIPINE BESYLATE 10 MG: 5 TABLET ORAL at 20:48

## 2025-05-23 RX ADMIN — METHYLPREDNISOLONE SODIUM SUCCINATE 40 MG: 40 INJECTION INTRAMUSCULAR; INTRAVENOUS at 14:10

## 2025-05-23 RX ADMIN — METOCLOPRAMIDE 10 MG: 10 TABLET ORAL at 16:00

## 2025-05-23 RX ADMIN — Medication 1000 UNITS: at 08:18

## 2025-05-23 RX ADMIN — ENOXAPARIN SODIUM 30 MG: 100 INJECTION SUBCUTANEOUS at 20:48

## 2025-05-23 RX ADMIN — ACETYLCYSTEINE 600 MG: 200 SOLUTION ORAL; RESPIRATORY (INHALATION) at 19:55

## 2025-05-23 RX ADMIN — Medication 2 PUFF: at 20:16

## 2025-05-23 RX ADMIN — ENOXAPARIN SODIUM 30 MG: 100 INJECTION SUBCUTANEOUS at 09:00

## 2025-05-23 RX ADMIN — PIPERACILLIN AND TAZOBACTAM 3375 MG: 3; .375 INJECTION, POWDER, LYOPHILIZED, FOR SOLUTION INTRAVENOUS at 16:10

## 2025-05-23 RX ADMIN — SODIUM CHLORIDE, PRESERVATIVE FREE 10 ML: 5 INJECTION INTRAVENOUS at 08:31

## 2025-05-23 RX ADMIN — METHYLPREDNISOLONE SODIUM SUCCINATE 40 MG: 40 INJECTION INTRAMUSCULAR; INTRAVENOUS at 04:14

## 2025-05-23 RX ADMIN — SODIUM CHLORIDE: 0.9 INJECTION, SOLUTION INTRAVENOUS at 20:41

## 2025-05-23 RX ADMIN — AZITHROMYCIN MONOHYDRATE 500 MG: 500 INJECTION, POWDER, LYOPHILIZED, FOR SOLUTION INTRAVENOUS at 04:17

## 2025-05-23 RX ADMIN — Medication 2 PUFF: at 08:51

## 2025-05-23 RX ADMIN — SODIUM CHLORIDE, PRESERVATIVE FREE 10 ML: 5 INJECTION INTRAVENOUS at 20:49

## 2025-05-23 RX ADMIN — METHYLPREDNISOLONE SODIUM SUCCINATE 40 MG: 40 INJECTION INTRAMUSCULAR; INTRAVENOUS at 08:18

## 2025-05-23 RX ADMIN — IPRATROPIUM BROMIDE AND ALBUTEROL SULFATE 1 DOSE: 2.5; .5 SOLUTION RESPIRATORY (INHALATION) at 13:56

## 2025-05-23 RX ADMIN — SODIUM CHLORIDE: 0.9 INJECTION, SOLUTION INTRAVENOUS at 14:48

## 2025-05-23 RX ADMIN — IPRATROPIUM BROMIDE AND ALBUTEROL SULFATE 1 DOSE: 2.5; .5 SOLUTION RESPIRATORY (INHALATION) at 08:51

## 2025-05-23 RX ADMIN — METHYLPREDNISOLONE SODIUM SUCCINATE 40 MG: 40 INJECTION INTRAMUSCULAR; INTRAVENOUS at 20:48

## 2025-05-23 RX ADMIN — IPRATROPIUM BROMIDE AND ALBUTEROL SULFATE 1 DOSE: 2.5; .5 SOLUTION RESPIRATORY (INHALATION) at 19:55

## 2025-05-23 RX ADMIN — ASPIRIN 81 MG: 81 TABLET, CHEWABLE ORAL at 08:18

## 2025-05-23 ASSESSMENT — COPD QUESTIONNAIRES
QUESTION7_SLEEPQUALITY: 2
QUESTION2_CHESTPHLEGM: 2
QUESTION6_LEAVINGHOUSE: 1
QUESTION5_HOMEACTIVITIES: 2
CAT_TOTALSCORE: 19
QUESTION8_ENERGYLEVEL: 3
QUESTION3_CHESTTIGHTNESS: 3
QUESTION1_COUGHFREQUENCY: 3
TOTAL_EXACERBATIONS_PASTYEAR: 2
QUESTION4_WALKINCLINE: 3

## 2025-05-23 ASSESSMENT — PAIN SCALES - GENERAL
PAINLEVEL_OUTOF10: 0

## 2025-05-23 NOTE — PLAN OF CARE
Problem: Discharge Planning  Goal: Discharge to home or other facility with appropriate resources  Outcome: Progressing  Flowsheets (Taken 5/22/2025 1940)  Discharge to home or other facility with appropriate resources:   Identify barriers to discharge with patient and caregiver   Arrange for needed discharge resources and transportation as appropriate   Identify discharge learning needs (meds, wound care, etc)   Arrange for interpreters to assist at discharge as needed   Refer to discharge planning if patient needs post-hospital services based on physician order or complex needs related to functional status, cognitive ability or social support system     Problem: ABCDS Injury Assessment  Goal: Absence of physical injury  Outcome: Progressing     Problem: Safety - Adult  Goal: Free from fall injury  Outcome: Progressing     Problem: Pain  Goal: Verbalizes/displays adequate comfort level or baseline comfort level  Outcome: Progressing

## 2025-05-23 NOTE — CARE COORDINATION
CM met with patient to discuss HH with speech, patient stated that he was not sure he would want HH at this time but that he wanted to speak with his wife when she returns to the hospital.    CM to follow up on dc regarding DCP and possible HH

## 2025-05-24 LAB
GLUCOSE BLD STRIP.AUTO-MCNC: 151 MG/DL (ref 65–100)
PERFORMED BY:: ABNORMAL

## 2025-05-24 PROCEDURE — 6360000002 HC RX W HCPCS: Performed by: INTERNAL MEDICINE

## 2025-05-24 PROCEDURE — 82962 GLUCOSE BLOOD TEST: CPT

## 2025-05-24 PROCEDURE — 94640 AIRWAY INHALATION TREATMENT: CPT

## 2025-05-24 PROCEDURE — 6360000002 HC RX W HCPCS: Performed by: FAMILY MEDICINE

## 2025-05-24 PROCEDURE — 6370000000 HC RX 637 (ALT 250 FOR IP): Performed by: FAMILY MEDICINE

## 2025-05-24 PROCEDURE — 2580000003 HC RX 258: Performed by: FAMILY MEDICINE

## 2025-05-24 PROCEDURE — 94669 MECHANICAL CHEST WALL OSCILL: CPT

## 2025-05-24 PROCEDURE — 6370000000 HC RX 637 (ALT 250 FOR IP): Performed by: INTERNAL MEDICINE

## 2025-05-24 PROCEDURE — 2060000000 HC ICU INTERMEDIATE R&B

## 2025-05-24 PROCEDURE — 2500000003 HC RX 250 WO HCPCS: Performed by: FAMILY MEDICINE

## 2025-05-24 RX ORDER — IPRATROPIUM BROMIDE AND ALBUTEROL SULFATE 2.5; .5 MG/3ML; MG/3ML
1 SOLUTION RESPIRATORY (INHALATION)
Status: DISCONTINUED | OUTPATIENT
Start: 2025-05-24 | End: 2025-05-25 | Stop reason: HOSPADM

## 2025-05-24 RX ORDER — PREDNISONE 20 MG/1
20 TABLET ORAL 2 TIMES DAILY
Status: DISCONTINUED | OUTPATIENT
Start: 2025-05-24 | End: 2025-05-25 | Stop reason: HOSPADM

## 2025-05-24 RX ADMIN — METHYLPREDNISOLONE SODIUM SUCCINATE 40 MG: 40 INJECTION INTRAMUSCULAR; INTRAVENOUS at 01:02

## 2025-05-24 RX ADMIN — PREDNISONE 20 MG: 20 TABLET ORAL at 12:56

## 2025-05-24 RX ADMIN — SODIUM CHLORIDE: 0.9 INJECTION, SOLUTION INTRAVENOUS at 10:15

## 2025-05-24 RX ADMIN — SODIUM CHLORIDE, PRESERVATIVE FREE 10 ML: 5 INJECTION INTRAVENOUS at 20:59

## 2025-05-24 RX ADMIN — PIPERACILLIN AND TAZOBACTAM 3375 MG: 3; .375 INJECTION, POWDER, LYOPHILIZED, FOR SOLUTION INTRAVENOUS at 10:17

## 2025-05-24 RX ADMIN — METOCLOPRAMIDE 10 MG: 10 TABLET ORAL at 18:54

## 2025-05-24 RX ADMIN — Medication 1000 UNITS: at 09:50

## 2025-05-24 RX ADMIN — ACETYLCYSTEINE 600 MG: 200 SOLUTION ORAL; RESPIRATORY (INHALATION) at 19:45

## 2025-05-24 RX ADMIN — ENOXAPARIN SODIUM 30 MG: 100 INJECTION SUBCUTANEOUS at 09:50

## 2025-05-24 RX ADMIN — PIPERACILLIN AND TAZOBACTAM 3375 MG: 3; .375 INJECTION, POWDER, LYOPHILIZED, FOR SOLUTION INTRAVENOUS at 19:00

## 2025-05-24 RX ADMIN — PREDNISONE 20 MG: 20 TABLET ORAL at 20:58

## 2025-05-24 RX ADMIN — ENOXAPARIN SODIUM 30 MG: 100 INJECTION SUBCUTANEOUS at 21:00

## 2025-05-24 RX ADMIN — ASPIRIN 81 MG: 81 TABLET, CHEWABLE ORAL at 09:50

## 2025-05-24 RX ADMIN — Medication 2 PUFF: at 19:52

## 2025-05-24 RX ADMIN — IPRATROPIUM BROMIDE AND ALBUTEROL SULFATE 1 DOSE: 2.5; .5 SOLUTION RESPIRATORY (INHALATION) at 19:45

## 2025-05-24 RX ADMIN — ACETYLCYSTEINE 600 MG: 200 SOLUTION ORAL; RESPIRATORY (INHALATION) at 08:02

## 2025-05-24 RX ADMIN — METHYLPREDNISOLONE SODIUM SUCCINATE 40 MG: 40 INJECTION INTRAMUSCULAR; INTRAVENOUS at 09:50

## 2025-05-24 RX ADMIN — IPRATROPIUM BROMIDE AND ALBUTEROL SULFATE 1 DOSE: 2.5; .5 SOLUTION RESPIRATORY (INHALATION) at 08:03

## 2025-05-24 RX ADMIN — AMLODIPINE BESYLATE 10 MG: 5 TABLET ORAL at 20:59

## 2025-05-24 RX ADMIN — METOCLOPRAMIDE 10 MG: 10 TABLET ORAL at 05:45

## 2025-05-24 RX ADMIN — PIPERACILLIN AND TAZOBACTAM 3375 MG: 3; .375 INJECTION, POWDER, LYOPHILIZED, FOR SOLUTION INTRAVENOUS at 01:06

## 2025-05-24 RX ADMIN — PIPERACILLIN AND TAZOBACTAM 3375 MG: 3; .375 INJECTION, POWDER, LYOPHILIZED, FOR SOLUTION INTRAVENOUS at 23:35

## 2025-05-24 RX ADMIN — Medication 2 PUFF: at 08:03

## 2025-05-24 RX ADMIN — SODIUM CHLORIDE, PRESERVATIVE FREE 10 ML: 5 INJECTION INTRAVENOUS at 09:51

## 2025-05-24 NOTE — PLAN OF CARE
Problem: Discharge Planning  Goal: Discharge to home or other facility with appropriate resources  5/23/2025 2215 by Rebecca Rivers RN  Outcome: Progressing  Flowsheets (Taken 5/23/2025 2030)  Discharge to home or other facility with appropriate resources: Identify barriers to discharge with patient and caregiver  5/23/2025 1652 by Heidi Chris RN  Outcome: Progressing     Problem: ABCDS Injury Assessment  Goal: Absence of physical injury  5/23/2025 2215 by Rebecca Rivers RN  Outcome: Progressing  5/23/2025 1652 by Heidi Chris RN  Outcome: Progressing     Problem: Safety - Adult  Goal: Free from fall injury  5/23/2025 2215 by eRbecca Rivers RN  Outcome: Progressing  5/23/2025 1652 by Heidi Chris RN  Outcome: Progressing     Problem: Pain  Goal: Verbalizes/displays adequate comfort level or baseline comfort level  5/23/2025 2215 by Rebecca Rivers RN  Outcome: Progressing  5/23/2025 1652 by Heidi Chris RN  Outcome: Progressing     Problem: Chronic Conditions and Co-morbidities  Goal: Patient's chronic conditions and co-morbidity symptoms are monitored and maintained or improved  Outcome: Progressing

## 2025-05-25 VITALS
TEMPERATURE: 97.5 F | HEIGHT: 68 IN | OXYGEN SATURATION: 97 % | HEART RATE: 88 BPM | DIASTOLIC BLOOD PRESSURE: 75 MMHG | WEIGHT: 195.33 LBS | SYSTOLIC BLOOD PRESSURE: 131 MMHG | BODY MASS INDEX: 29.6 KG/M2 | RESPIRATION RATE: 18 BRPM

## 2025-05-25 PROCEDURE — 6360000002 HC RX W HCPCS: Performed by: INTERNAL MEDICINE

## 2025-05-25 PROCEDURE — 6370000000 HC RX 637 (ALT 250 FOR IP): Performed by: FAMILY MEDICINE

## 2025-05-25 PROCEDURE — 6370000000 HC RX 637 (ALT 250 FOR IP): Performed by: INTERNAL MEDICINE

## 2025-05-25 PROCEDURE — 94669 MECHANICAL CHEST WALL OSCILL: CPT

## 2025-05-25 PROCEDURE — 94640 AIRWAY INHALATION TREATMENT: CPT

## 2025-05-25 PROCEDURE — 2580000003 HC RX 258: Performed by: FAMILY MEDICINE

## 2025-05-25 PROCEDURE — 94762 N-INVAS EAR/PLS OXIMTRY CONT: CPT

## 2025-05-25 PROCEDURE — 6360000002 HC RX W HCPCS: Performed by: FAMILY MEDICINE

## 2025-05-25 PROCEDURE — 94761 N-INVAS EAR/PLS OXIMETRY MLT: CPT

## 2025-05-25 PROCEDURE — 2500000003 HC RX 250 WO HCPCS: Performed by: FAMILY MEDICINE

## 2025-05-25 RX ORDER — PREDNISONE 10 MG/1
TABLET ORAL
Qty: 21 TABLET | Refills: 0 | Status: SHIPPED | OUTPATIENT
Start: 2025-05-25 | End: 2025-06-03

## 2025-05-25 RX ORDER — METOCLOPRAMIDE 10 MG/1
10 TABLET ORAL
Qty: 120 TABLET | Refills: 3 | Status: SHIPPED | OUTPATIENT
Start: 2025-05-25

## 2025-05-25 RX ORDER — AMOXICILLIN AND CLAVULANATE POTASSIUM 500; 125 MG/1; MG/1
1 TABLET, FILM COATED ORAL 3 TIMES DAILY
Qty: 21 TABLET | Refills: 0 | Status: SHIPPED | OUTPATIENT
Start: 2025-05-25 | End: 2025-06-01

## 2025-05-25 RX ADMIN — AMLODIPINE BESYLATE 10 MG: 5 TABLET ORAL at 19:52

## 2025-05-25 RX ADMIN — METOCLOPRAMIDE 10 MG: 10 TABLET ORAL at 06:20

## 2025-05-25 RX ADMIN — ENOXAPARIN SODIUM 30 MG: 100 INJECTION SUBCUTANEOUS at 11:24

## 2025-05-25 RX ADMIN — PIPERACILLIN AND TAZOBACTAM 3375 MG: 3; .375 INJECTION, POWDER, LYOPHILIZED, FOR SOLUTION INTRAVENOUS at 11:29

## 2025-05-25 RX ADMIN — Medication 1000 UNITS: at 10:44

## 2025-05-25 RX ADMIN — SODIUM CHLORIDE, PRESERVATIVE FREE 10 ML: 5 INJECTION INTRAVENOUS at 11:25

## 2025-05-25 RX ADMIN — ENOXAPARIN SODIUM 30 MG: 100 INJECTION SUBCUTANEOUS at 19:52

## 2025-05-25 RX ADMIN — IPRATROPIUM BROMIDE AND ALBUTEROL SULFATE 1 DOSE: 2.5; .5 SOLUTION RESPIRATORY (INHALATION) at 07:46

## 2025-05-25 RX ADMIN — SODIUM CHLORIDE: 0.9 INJECTION, SOLUTION INTRAVENOUS at 02:07

## 2025-05-25 RX ADMIN — METOCLOPRAMIDE 10 MG: 10 TABLET ORAL at 18:39

## 2025-05-25 RX ADMIN — PREDNISONE 20 MG: 20 TABLET ORAL at 19:52

## 2025-05-25 RX ADMIN — ASPIRIN 81 MG: 81 TABLET, CHEWABLE ORAL at 10:44

## 2025-05-25 RX ADMIN — Medication 2 PUFF: at 07:46

## 2025-05-25 RX ADMIN — PREDNISONE 20 MG: 20 TABLET ORAL at 10:44

## 2025-05-25 RX ADMIN — ACETYLCYSTEINE 600 MG: 200 SOLUTION ORAL; RESPIRATORY (INHALATION) at 07:46

## 2025-05-25 NOTE — DISCHARGE SUMMARY
Discharge Summary    Name: Prabhakar Moran  [id number]  YOB: 1955 (Age: 69 y.o.)   Date of Admission: 5/20/2025  Date of Discharge: 5/25/2025  Attending Physician: Ap Wilhelm MD    Discharge Diagnosis:     Consultations:  IP CONSULT TO PULMONOLOGY      Brief Admission History/Reason for Admission Per Urbano Velez MD:     Chief Complaint / Reason for Physician Visit  \" Shortness of breath\".  Discussed with RN events overnight.      5/21 --   Admission H&P  --  Dr. Velez  69 y.o.  male with PMHx significant for COPD hypertension GERD came to emergency room complaining of shortness of breath started this morning shortness of breath on little exertion generalized weakness unable to breathe came to emergency room seen by the ER physician workup done in the ER blood work shows leukocytosis BNP was 134 chest x-ray was negative but CT scan of the chest shows right lower lobe pneumonia patient was recommend to be admitted for further workup and treatment patient was hypoxic placed on 2 L oxygen by nasal cannula and given nebulizer treatment.        5/22  Patient is alert oriented x 4 and continues to have some shortness of breath.  Continues to require 2 L NC O2 with mild wheezing.  No overnight fever/chills, chest pain, and/vomiting noted.     Counseled on findings of aspiration per MBS and speech therapy evaluation.  Counseled on continue IV antibiotics with pulmonology following.  Will attempt to wean off oxygen over the next 24 to 48 hours.        5/23  Patient is alert oriented x 4 and continues to have some shortness of breath and wheezing with cough.  Evaluated with wife at bedside.  Continues to require 2 L NC O2 with mild wheezing.  No overnight fever/chills, chest pain, and/vomiting noted.     Discussed with pulmonology and counseled on continuing IV Solu-Medrol and IV antibiotics with significant aspiration pneumonia and COPD exacerbation.  Oliver is

## 2025-05-25 NOTE — CASE COMMUNICATION
Discharge Acknowledged:  Patient's discharge order States, Home with Home Health PT/OT, skilled nursing.  Patient initially declined HH.      Patient is not sure if they want HH or see PCP for outpatient PT/OT.  Patient states he needs time to think.  Nurse aware.     CM will need to reconvene with patient after he speaks with nurse and/or doctor for a choice.

## 2025-05-25 NOTE — PLAN OF CARE
Problem: Discharge Planning  Goal: Discharge to home or other facility with appropriate resources  5/24/2025 2339 by Rebecca Rivers RN  Outcome: Progressing  Flowsheets (Taken 5/24/2025 2239)  Discharge to home or other facility with appropriate resources: Identify barriers to discharge with patient and caregiver  5/24/2025 1405 by Akua Lopez RN  Outcome: Progressing     Problem: ABCDS Injury Assessment  Goal: Absence of physical injury  5/24/2025 2339 by Rebecca Rivers RN  Outcome: Progressing  5/24/2025 1405 by Akua Lopez RN  Outcome: Progressing     Problem: Safety - Adult  Goal: Free from fall injury  5/24/2025 2339 by Rebecca Rivers RN  Outcome: Progressing  5/24/2025 1405 by Akua Lopez RN  Outcome: Progressing     Problem: Pain  Goal: Verbalizes/displays adequate comfort level or baseline comfort level  5/24/2025 2339 by Rebecca Rivers RN  Outcome: Progressing  5/24/2025 1405 by Akua Lopez RN  Outcome: Progressing     Problem: Chronic Conditions and Co-morbidities  Goal: Patient's chronic conditions and co-morbidity symptoms are monitored and maintained or improved  5/24/2025 2339 by Rebecca Rivers RN  Outcome: Progressing  Flowsheets (Taken 5/24/2025 2239)  Care Plan - Patient's Chronic Conditions and Co-Morbidity Symptoms are Monitored and Maintained or Improved: Monitor and assess patient's chronic conditions and comorbid symptoms for stability, deterioration, or improvement  5/24/2025 1405 by Akua Lopez, RN  Outcome: Progressing

## 2025-05-26 LAB
BACTERIA SPEC CULT: NORMAL
BACTERIA SPEC CULT: NORMAL
Lab: NORMAL
Lab: NORMAL

## 2025-05-26 NOTE — PROGRESS NOTES
Hospitalist Progress Note    NAME:   Prabhakar Moran   : 1955   MRN: [id number]     Date/Time: 2025 9:19 AM  Patient PCP: Houston Stevens MD    Estimated discharge date:  Barriers:       Assessment / Plan:      Acute hypoxic respiratory failure  Secondary to right lower lobe pneumonia and possible aspiration  Continues on 2 L NC O2.    Right lower lobe pneumonia  Possible aspiration pneumonia with MBS per speech therapy revealing oropharyngeal dysphagia.  Continue IV Zosyn, azithromycin.  Appreciate pulmonology consultation.    Acute exacerbation of COPD  Continue IV Solu-Medrol and nebulizer treatments per RT.  Please I-S at bedside.  Pulmonology following.    Oropharyngeal dysphagia  Noted on MBS per speech therapy.  Continue modified diet but soft and bite-size with mildly thickened liquids.  Appreciate speech therapy consultation and following along.    Hypertension  Continue Norvasc    Full CODE STATUS    DVT prophylaxis  Lovenox SC      Medical Decision Making:   I personally reviewed labs:  I personally reviewed imaging:  I personally reviewed EKG:  Toxic drug monitoring:   Discussed case with: Patient, nursing    Total time 35 minutes.      Subjective:     Chief Complaint / Reason for Physician Visit  \" Shortness of breath\".  Discussed with RN events overnight.      --   Admission H&P  --  Dr. Velez  69 y.o.  male with PMHx significant for COPD hypertension GERD came to emergency room complaining of shortness of breath started this morning shortness of breath on little exertion generalized weakness unable to breathe came to emergency room seen by the ER physician workup done in the ER blood work shows leukocytosis BNP was 134 chest x-ray was negative but CT scan of the chest shows right lower lobe pneumonia patient was recommend to be admitted for further workup and treatment patient was hypoxic placed on 2 L oxygen by nasal cannula and given nebulizer treatment.        Patient 
      Hospitalist Progress Note    NAME:   Prabhakar Moran   : 1955   MRN: [id number]     Date/Time: 2025 10:09 AM  Patient PCP: Houston Stevens MD    Estimated discharge date:  Barriers:       Assessment / Plan:      Acute hypoxic respiratory failure  Secondary to right lower lobe pneumonia and possible aspiration  Continues on 2 L NC O2.    Right lower lobe pneumonia  Possible aspiration pneumonia with MBS per speech therapy revealing oropharyngeal dysphagia.  Continue IV Zosyn, azithromycin.  Appreciate pulmonology consultation.    Acute exacerbation of COPD  Continue IV Solu-Medrol and nebulizer treatments per RT.  Please I-S at bedside.  Pulmonology following.    Oropharyngeal dysphagia  Noted on MBS per speech therapy.  Continue modified diet but soft and bite-size with mildly thickened liquids.  Appreciate speech therapy consultation and following along.    Hypertension  Continue Norvasc    Full CODE STATUS    DVT prophylaxis  Lovenox SC      Medical Decision Making:   I personally reviewed labs:  I personally reviewed imaging:  I personally reviewed EKG:  Toxic drug monitoring:   Discussed case with: Patient, nursing    Total time 35 minutes.      Subjective:     Chief Complaint / Reason for Physician Visit  \" Shortness of breath\".  Discussed with RN events overnight.      --   Admission H&P  --  Dr. Velez  69 y.o.  male with PMHx significant for COPD hypertension GERD came to emergency room complaining of shortness of breath started this morning shortness of breath on little exertion generalized weakness unable to breathe came to emergency room seen by the ER physician workup done in the ER blood work shows leukocytosis BNP was 134 chest x-ray was negative but CT scan of the chest shows right lower lobe pneumonia patient was recommend to be admitted for further workup and treatment patient was hypoxic placed on 2 L oxygen by nasal cannula and given nebulizer 
      Hospitalist Progress Note    NAME:   Prabhakar Moran   : 1955   MRN: [id number]     Date/Time: 2025 4:04 PM  Patient PCP: Houston Stevens MD    Estimated discharge date:  Barriers:       Assessment / Plan:      Acute hypoxic respiratory failure  Secondary to right lower lobe pneumonia and possible aspiration  Continues on 2 L NC O2.    Right lower lobe pneumonia  Possible aspiration pneumonia with MBS per speech therapy revealing oropharyngeal dysphagia.  Continue IV Zosyn, azithromycin.  Appreciate pulmonology consultation.    Acute exacerbation of COPD  Continue IV Solu-Medrol and nebulizer treatments per RT.  Please I-S at bedside.  Pulmonology following.    Oropharyngeal dysphagia  Noted on MBS per speech therapy.  Continue modified diet but soft and bite-size with mildly thickened liquids.  Appreciate speech therapy consultation and following along.    Hypertension  Continue Norvasc    Full CODE STATUS    DVT prophylaxis  Lovenox SC      Medical Decision Making:   I personally reviewed labs:  I personally reviewed imaging:  I personally reviewed EKG:  Toxic drug monitoring:   Discussed case with: Patient, nursing    Total time 40 minutes.      Subjective:     Chief Complaint / Reason for Physician Visit  \" Shortness of breath\".  Discussed with RN events overnight.      --   Admission H&P  --  Dr. Velez  69 y.o.  male with PMHx significant for COPD hypertension GERD came to emergency room complaining of shortness of breath started this morning shortness of breath on little exertion generalized weakness unable to breathe came to emergency room seen by the ER physician workup done in the ER blood work shows leukocytosis BNP was 134 chest x-ray was negative but CT scan of the chest shows right lower lobe pneumonia patient was recommend to be admitted for further workup and treatment patient was hypoxic placed on 2 L oxygen by nasal cannula and given nebulizer treatment.        Patient 
   05/24/25 1200   RT Protocol   History Pulmonary Disease 2   Respiratory pattern 0   Breath sounds 2   Cough 0   Indications for Bronchodilator Therapy Mucolytic ordered   Bronchodilator Assessment Score 4     RT Nebulizer Bronchodilator Protocol Note    There is a bronchodilator order in the chart from a provider indicating to follow the RT Bronchodilator Protocol and there is an “Initiate RT Bronchodilator Protocol” order as well (see protocol at bottom of note).    CXR Findings:  No results found.    The findings from the last RT Protocol Assessment were as follows:  Smoking: (P) Chronic pulmonary disease  Respiratory Pattern: (P) Regular pattern and RR 12-20 bpm  Breath Sounds: (P) Slightly diminished and/or crackles  Cough: (P) Strong, spontaneous, non-productive  Indication for Bronchodilator Therapy: (P) Mucolytic ordered  Bronchodilator Assessment Score: (P) 4    Aerosolized bronchodilator medication orders have been revised according to the RT Nebulizer Bronchodilator Protocol below.    Respiratory Therapist to perform RT Therapy Protocol Assessment initially then follow the protocol.  Repeat RT Therapy Protocol Assessment PRN for score 0-3 or on second treatment, BID, and PRN for scores above 3.    No Indications - adjust the frequency to every 6 hours PRN wheezing or bronchospasm, if no treatments needed after 48 hours then discontinue using Per Protocol order mode.     If indication present, adjust the RT bronchodilator orders based on the Bronchodilator Assessment Score as indicated below.  If a patient is on this medication at home then do not decrease Frequency below that used at home.    0-3 - enter or revise RT bronchodilator order(s) to equivalent RT Bronchodilator order with Frequency of every 4 hours PRN for wheezing or increased work of breathing using Per Protocol order mode.       4-6 - enter or revise RT Bronchodilator order(s) to two equivalent RT bronchodilator orders with one order with 
  IMPRESSION:   Acute hypoxic respiratory failure  Right lower lobe pneumonia rule out aspiration  COPD exacerbation  Dysphagia  Gastroesophageal reflux disease  Pt is at high risk of sudden decline and decompensation with life threatening consequenses and continued end organ dysfunction and failure  Pt is critically ill. Time spent with pt and staff actively rendering care, managing pt and coordinating care as stated below;  35 minutes, exclusive of any procedures      RECOMMENDATIONS/PLAN:   69-year-old male came because of shortness of breath dyspnea follows with me in my office regarding his COPD he is on trilogy inhaler at home not on oxygen having severe reflux seen by speech as an outpatient supposed to get modified barium swallow she came to the hospital chest x-ray was done followed by CAT scan of the chest which showed right lower lobe infiltrate he has significant history of swallowing peanut long time ago and he was told that he had a scar on the right side he was hypoxic he was put on oxygen 2 L nasal cannula he was complaining about chills but no fever having difficulty in swallowing food so came to the hospital got admitted he is a lifetime non-smoker discussed with his wife  Agree with Empiric IV antibiotics pending culture results on Rocephin and  Will get modified barium swallow speech therapy evaluation  CTA negative for PE but showed right lower lobe infiltrate/atelectasis  IV Solu-Medrol, nebulizer treatment  IV vasopressors for circulatory shock refractory to fluids to maintain SBP> 90  On oxygen 2 L nasal Cannula oxygen as salvage oxygen delivery device to provide high concentration of oxygen to overcome refractory hypoxia;   Transfuse prn to maintain Hgb > 7  Labs to follow electrolytes, renal function and and blood counts  Bronchial hygiene with respiratory therapy techniques, bronchodilators  Pt needs IV fluids with additives and Drug therapy requiring intensive monitoring for 
  IMPRESSION:   Acute hypoxic respiratory failure  Right lower lobe pneumonia rule out aspiration  COPD exacerbation  Dysphagia  Gastroesophageal reflux disease  Pt is at high risk of sudden decline and decompensation with life threatening consequenses and continued end organ dysfunction and failure  Pt is critically ill. Time spent with pt and staff actively rendering care, managing pt and coordinating care as stated below; 30 minutes, exclusive of any procedures      RECOMMENDATIONS/PLAN:   69-year-old male came because of shortness of breath dyspnea follows with me in my office regarding his COPD he is on trilogy inhaler at home not on oxygen having severe reflux seen by speech as an outpatient supposed to get modified barium swallow she came to the hospital chest x-ray was done followed by CAT scan of the chest which showed right lower lobe infiltrate he has significant history of swallowing peanut long time ago and he was told that he had a scar on the right side he was hypoxic he was put on oxygen 2 L nasal cannula he was complaining about chills but no fever having difficulty in swallowing food so came to the hospital got admitted he is a lifetime non-smoker discussed with his wife  Agree with Empiric IV antibiotics pending culture results on Rocephin and  Modified barium swallow shows aspiration on thickened diet  CTA negative for PE but showed right lower lobe infiltrate/atelectasis  IV Solu-Medrol, nebulizer treatment  Aspiration precaution  On oxygen 2 L nasal Cannula oxygen as salvage oxygen delivery device to provide high concentration of oxygen to overcome refractory hypoxia;   Transfuse prn to maintain Hgb > 7  Labs to follow electrolytes, renal function and and blood counts  Bronchial hygiene with respiratory therapy techniques, bronchodilators  Pt needs IV fluids with additives and Drug therapy requiring intensive monitoring for toxicity  Prescription drug management with home med reconciliation 
  IMPRESSION:   Acute hypoxic respiratory failure  Right lower lobe pneumonia rule out aspiration  COPD exacerbation  Dysphagia  Gastroesophageal reflux disease  Pt is at high risk of sudden decline and decompensation with life threatening consequenses and continued end organ dysfunction and failure  Pt is critically ill. Time spent with pt and staff actively rendering care, managing pt and coordinating care as stated below; 30 minutes, exclusive of any procedures      RECOMMENDATIONS/PLAN:   69-year-old male came because of shortness of breath dyspnea follows with me in my office regarding his COPD he is on trilogy inhaler at home not on oxygen having severe reflux seen by speech as an outpatient supposed to get modified barium swallow she came to the hospital chest x-ray was done followed by CAT scan of the chest which showed right lower lobe infiltrate he has significant history of swallowing peanut long time ago and he was told that he had a scar on the right side he was hypoxic he was put on oxygen 2 L nasal cannula he was complaining about chills but no fever having difficulty in swallowing food so came to the hospital got admitted he is a lifetime non-smoker discussed with his wife  Agree with Empiric IV antibiotics pending culture results on Rocephin and  Modified barium swallow shows aspiration on thickened diet  CTA negative for PE but showed right lower lobe infiltrate/atelectasis  IV Solu-Medrol, nebulizer treatment  Aspiration precaution  On oxygen 2 L nasal Cannula oxygen as salvage oxygen delivery device to provide high concentration of oxygen to overcome refractory hypoxia;   Transfuse prn to maintain Hgb > 7  Labs to follow electrolytes, renal function and and blood counts  Bronchial hygiene with respiratory therapy techniques, bronchodilators  Pt needs IV fluids with additives and Drug therapy requiring intensive monitoring for toxicity  Prescription drug management with home med reconciliation 
  Physician Progress Note      PATIENT:               LESLEY ENRIQUEZ  CSN #:                  [id number]  :                       1955  ADMIT DATE:       2025 8:37 PM  DISCH DATE:  RESPONDING  PROVIDER #:        Urbano Velez MD          QUERY TEXT:    \"Gram-negative pneumonia\" is documented in the medical record (H&P by Dr Velez).  Please clarify any associated diagnoses:    The clinical indicators include:   WBC 15.2, procal <0.05, LA 0.80, Tmax 99.3, HR 25,  (lab/nursing   flow sheet)     WBC 14.8, HR 92, RR 18-20 (lab/nursing flow sheet)    MAR: Tylenol po, 3,129 ml NS bolus (), NS 75 ml/hr, Zithromax IV, Cefepime   IV, IV Zosyn q8h  Options provided:  -- Sepsis POA  -- Severe sepsis POA  -- Localized infection without sepsis  -- Other - I will add my own diagnosis  -- Disagree - Not applicable / Not valid  -- Disagree - Clinically unable to determine / Unknown  -- Refer to Clinical Documentation Reviewer    PROVIDER RESPONSE TEXT:    Severe sepsis POA    Query created by: ANDRES GAMBLE on 2025 11:25 AM      Electronically signed by:  Urbano Velez MD 2025 7:52 PM          
.4  
4 Eyes Skin Assessment     NAME:  Prabhakar Moran  YOB: 1955  MEDICAL RECORD NUMBER:  [id number]    The patient is being assessed for  Admission    I agree that at least one RN has performed a thorough Head to Toe Skin Assessment on the patient. ALL assessment sites listed below have been assessed.      Areas assessed by both nurses:    Head, Face, Ears, Shoulders, Back, Chest, Arms, Elbows, Hands, Sacrum. Buttock, Coccyx, Ischium, Legs. Feet and Heels, and Under Medical Devices         Does the Patient have a Wound? No noted wound(s)       Bobby Prevention initiated by RN: No  Wound Care Orders initiated by RN: No    Pressure Injury (Stage 3,4, Unstageable, DTI, NWPT, and Complex wounds) if present, place Wound referral order by RN under : No    New Ostomies, if present place, Ostomy referral order under : No     Nurse 1 eSignature: Electronically signed by Argenis Miles RN on 5/21/25 at 5:01 PM EDT    **SHARE this note so that the co-signing nurse can place an eSignature**    Nurse 2 eSignature: Electronically signed by Boom Holden RN on 5/21/25 at 5:01 PM EDT   
Comprehensive Nutrition Assessment    Type and Reason for Visit:  Initial, Positive nutrition screen (MST 2)    Nutrition Recommendations/Plan:   Continue Current diet; adjust per SLP recs  Encourage >75% PO intakes  1:1 fdg assist at meals  Monitor/document wt, BM, PO+ONS% in I/O     Malnutrition Assessment:  Malnutrition Status:  Insufficient data (05/22/25 1320)    Context:  Acute Illness     Findings of the 6 clinical characteristics of malnutrition:  Energy Intake:  Unable to assess  Weight Loss:  Mild weight loss (1.5% per EMR x5mo)     Body Fat Loss:  Unable to assess     Muscle Mass Loss:  Unable to assess    Fluid Accumulation:  Mild     Strength:  Not Performed    Nutrition Assessment:    Admit for acute interstitial PNA. Assessed for MST 2 (wt loss and poor PO d/t decreased appetite pta). CT  chest (5/20) showed right lower lobe pneumonia currently on 2LNC. Per chart review, pt w/ extensive dysphagia hx and prev going to OP SLP therapy w/ most recent 5/20. Prev MBS (12/2025) and was rec'd by SLP to obtain updated MBS. Plans to complete while IP.  Currently ordered SBS/mildly thick liqs- baseline. SLP noted pt consumes soft textures and a combo of thin and mildly thick liqs. Per I/O, avg PO intakes % x2 documented meals. Per EMR, relatively wt stable x5mo. Labs: (5/21) Cl 111, , Alb 3.0, Alk Phos 119, (5/22) H/H 11.7/35.5. Meds: aspirin, azithromycin, zosyn, lovenox, solumedrol, vit D, NS @ 75ml/hr    Nutrition Related Findings:    NFPE deferred. +hx dysphagia. Currently on SBS/mildly thick liqs- baseline/home diet pta. SLP rec'd 1:1 assistance with ALL PO intake, STRICT aspiration and GERD precautions, monitor pt closely for s/s aspiration, meds whole with applesauce or pudding, FEED ONLY IF AWAKE AND ALERT. No N/V/D/C noted. Last BM pta. +2 nonpitting BLE edema. Wound Type: None       Current Nutrition Intake & Therapies:    Average Meal Intake: %  Average Supplements Intake: None 
Noticed discharged order. Let family know. Pt wife had concerns on HH and the set up. They wanted to go to out patient OT/PT, Speech.  unable to be reached at the time. Informed them that case management would have to set that up. Pt/pt. Wife wanted to follow back up with case management. Gave the pt. The unit number because they wanted to call and get in touch with a  about HH and how long it would be for.   
Patient has an order for discharge. Discharge instructions and paper works were given by the day shift staff. No IV access noted during handoff. V/S taken and recorded and due meds provided prior discharge. No further questions asked by the patient and family. Patient was assisted by the writer and patient's wife via wheelchair to the entrance of the hospital. Assisted to their vehicle safely.  
Pulmonary Disease Navigator Note  [facility]    Current GOLD classification for Prabhakar Moran    Patient's chart was reviewed by Pulmonary Disease Navigator for compliance with prescribed treatment with Global Initiative For Chronic Obstructive Lung Disease (GOLD).    Please, review beneath recommendations for pharmacological treatment for patient with obstructive lung disease.     Current Pharmacological Treatment:      Mr Moran's current orders include Albuterol mdi qid prn, Symbicort 160-4.5 mcg/act BID and Duoneb solution q6h w.a.  Current eosinophil count: 0  Recorded domestic exacerbations past 12 months: 2          Combination  ICS-LABA Inhaler Acceptable   Therapy  Device For Use   Salmeterol/fluticasone Advair  Diskus    Vilanterol/fluticasone  Breo  Ellipta    Formoterol/mometasone  Dulera MDI Yes   Formoterol/budesonide  Symbicort MDI Yes   Triple Therapy Recommended (For Group E) [id number]     Fluticasone/umeclidinium/vilanterol  Trelegy  Ellipta    Budesonide/glycopyrrolate/formoterol fumarate  Breztri  MDI Yes   Recommended (For Group A & B) LAMA/LABA     Vilanterol/umeclidinium  Anoro  Ellipta    Olodaterol/tiotropium  Stiolto  Respimat Yes   Formoterol/glycopyrronium  Bevespi  MDI Yes   Aclidinium/formoterol Duaklir  Pressair      *Nebulizer Options    LAMA LABA ICS   Lizpearisteoi  Cyrilvantiffanie Budesonide    Performist      The CAT provides a reliable measure of the impact of COPD on a patient's health status. Range of CAT scores from 0-40. Higher scores denote a more severe impact of COPD on a patient’s life. Scores <10 have a low impact, 10-20 medium, 21-30 high and >30 very high impact, requiring gradually more interventions.     Current recorded COPD Assessment Tool (CAT) score of  Cough Assessment: 3  Phlegm Assessment: 2  Chest tightness: 3  Walking on an incline: 3  Home Activities: 2  Confident Leaving The Home: 1  Sleeping Soundly: 2  Have Energy: 
Received Order for Telemetry     Prabhakar Moran   1955   [id number]   Septicemia (HCC) [A41.9]  Acute interstitial pneumonia (HCC) [J84.9]  Acute respiratory failure with hypoxia (HCC) [J96.01]  Pneumonia of right lung due to infectious organism, unspecified part of lung [J18.9]   Urbano Velez MD     Tele Box # [id number] placed on patient at  0452 am  ER Room # 4  Admitting to Room 479  Transferring Nurse MANI  Verified with Primary Nurse SHARON HOUGH at  0500 am   
monitoring for toxicity  Prescription drug management with home med reconciliation reviewed  DVT, SUP prophylaxis     5/22  Still short of breath dyspneic has some wheezing not getting nebulizer treatment on albuterol inhaler will change to neb treatment, continue with IV Solu-Medrol, high risk for aspiration seen by speech therapist recommends strict aspiration and GERD precaution  5/23  Slept well last night but still having cough unable to produce the sputum shortness of breath on oxygen via nasal cannula severe reflux still wheezing will add Reglan discussed with her and his wife at bedside patient is on mild thick liquids patient is oropharyngeal dysphagia silent aspiration as evidenced by modified barium    [x] High complexity decision making was performed  [x] See my orders for details    PMH:  has a past medical history of Asthma, Benign essential hypertension, Bronchiectasis without complication (HCC), Elevated fasting blood sugar, Eosinophilia, unspecified type, GERD (gastroesophageal reflux disease), Hypercholesterolemia, Hypertension, Mild anemia, Multinodular goiter, Obesity (BMI 30.0-34.9), Thrombocytopenia, Thrombocytopenia, Thrombocytopenia, Thyroid nodule, and Vitamin D deficiency.    PSH:   has a past surgical history that includes IR GUIDED FNA (11/2/2022).     FHX: family history includes High Blood Pressure in his father; Hypertension in his father.     SHX:  reports that he has never smoked. He has never used smokeless tobacco. He reports that he does not drink alcohol and does not use drugs.    ALL: No Known Allergies     MEDS:   [x] Reviewed - As Below   [] Not reviewed    Current Facility-Administered Medications   Medication Dose Route Frequency Provider Last Rate Last Admin    acetylcysteine (MUCOMYST) 20 % solution 600 mg  600 mg Inhalation BID RT Adria Amor MD        [Held by provider] albuterol sulfate HFA (PROVENTIL;VENTOLIN;PROAIR) 108 (90 Base) MCG/ACT inhaler 2 puff  2 puff

## 2025-06-03 ENCOUNTER — OFFICE VISIT (OUTPATIENT)
Facility: CLINIC | Age: 70
End: 2025-06-03

## 2025-06-03 VITALS
BODY MASS INDEX: 28.35 KG/M2 | SYSTOLIC BLOOD PRESSURE: 143 MMHG | WEIGHT: 198 LBS | HEART RATE: 99 BPM | DIASTOLIC BLOOD PRESSURE: 82 MMHG | HEIGHT: 70 IN | OXYGEN SATURATION: 98 %

## 2025-06-03 DIAGNOSIS — I10 ESSENTIAL HYPERTENSION, BENIGN: ICD-10-CM

## 2025-06-03 DIAGNOSIS — E78.00 HYPERCHOLESTEROLEMIA: ICD-10-CM

## 2025-06-03 DIAGNOSIS — Z87.01 HISTORY OF PNEUMONIA: ICD-10-CM

## 2025-06-03 DIAGNOSIS — R13.12 DYSPHAGIA, OROPHARYNGEAL: Primary | ICD-10-CM

## 2025-06-03 DIAGNOSIS — R19.7 DIARRHEA, UNSPECIFIED TYPE: ICD-10-CM

## 2025-06-03 DIAGNOSIS — R26.2 DIFFICULTY IN WALKING: ICD-10-CM

## 2025-06-03 DIAGNOSIS — R73.09 ELEVATED HEMOGLOBIN A1C: ICD-10-CM

## 2025-06-03 DIAGNOSIS — J45.909 MODERATE ASTHMA, UNSPECIFIED WHETHER COMPLICATED, UNSPECIFIED WHETHER PERSISTENT: ICD-10-CM

## 2025-06-03 DIAGNOSIS — D72.829 LEUKOCYTOSIS, UNSPECIFIED TYPE: ICD-10-CM

## 2025-06-03 DIAGNOSIS — M79.604 RIGHT LEG PAIN: ICD-10-CM

## 2025-06-03 DIAGNOSIS — Z87.09 HISTORY OF ACUTE RESPIRATORY FAILURE: ICD-10-CM

## 2025-06-03 ASSESSMENT — PATIENT HEALTH QUESTIONNAIRE - PHQ9
1. LITTLE INTEREST OR PLEASURE IN DOING THINGS: NOT AT ALL
SUM OF ALL RESPONSES TO PHQ QUESTIONS 1-9: 0
2. FEELING DOWN, DEPRESSED OR HOPELESS: NOT AT ALL
SUM OF ALL RESPONSES TO PHQ QUESTIONS 1-9: 0

## 2025-06-03 NOTE — PROGRESS NOTES
[facility] Internal Medicine  [address], [place] [address]  Phone: [phone]      Prabhakar Moran (: 1955) is a 69 y.o. male, established patient, here for evaluation of the following chief complaint(s):  Follow-Up from Hospital ([facility] 25-25. TCM call made on 25. )     SUBJECTIVE/OBJECTIVE:  History of Present Illness  The patient is a 69-year-old male with a past medical history of hypertension, asthma, COPD, and bronchiectasis. He is seen today for follow-up of his recent hospitalization from 2025 to 2025. He is accompanied by his wife.    He has expressed interest in outpatient speech therapy, PT and OT similar to his previous experience. During his recent hospitalization, he was diagnosed with pneumonia. He was also informed of cardiac complications. He was treated with oxygen, IV antibiotics (Zosyn and azithromycin), and Augmentin. He has been using a thickener for his liquids and primarily consumes apple and cranberry juice. He has been adhering to a diet of soft foods, including banana pudding and noodles, as recommended during his hospital stay. He has difficulty swallowing bread and rice. He has been advised against the consumption of beans and peas due to their skin, which he previously coughed up. He has been informed that he may have diabetes, but he is unsure if this is accurate. He has been attempting to eliminate soft drinks from his diet and is seeking advice on alternative beverages.    He has been experiencing loose bowel movements, which he attributes to his high juice intake. He reports no presence of blood or green coloration in his stools.    He reported leg swelling upon admission to the hospital, which resolved during his stay. However, he has been experiencing tightness and pain in his right leg during ambulation for the past 3 to 4 days. He does not experience pain upon palpation of the area.    He has been

## 2025-06-03 NOTE — PROGRESS NOTES
.  Chief Complaint   Patient presents with    Follow-Up from [facility] 5/20/25-5/25/25. No TCM call made.      Have you been to the ER, urgent care clinic since your last visit?  Hospitalized since your last visit?   YES - When: approximately 1  weeks ago.  Where and Why: [facility] for shortness of breath.    Have you seen or consulted any other health care providers outside our system since your last visit?   NO    .BP (!) 153/91 (BP Site: Right Upper Arm, Patient Position: Sitting, BP Cuff Size: Medium Adult)   Pulse 99   Ht 1.778 m (5' 10\") Comment: Patient reported  Wt 89.8 kg (198 lb)   SpO2 98%   BMI 28.41 kg/m²

## 2025-06-04 ASSESSMENT — ENCOUNTER SYMPTOMS
DIARRHEA: 0
VOMITING: 0
ABDOMINAL PAIN: 0
NAUSEA: 0
COUGH: 0
SHORTNESS OF BREATH: 0

## 2025-06-17 ENCOUNTER — HOSPITAL ENCOUNTER (OUTPATIENT)
Facility: HOSPITAL | Age: 70
Setting detail: RECURRING SERIES
Discharge: HOME | End: 2025-06-20
Attending: INTERNAL MEDICINE
Payer: MEDICARE

## 2025-06-17 ENCOUNTER — APPOINTMENT (OUTPATIENT)
Facility: HOSPITAL | Age: 70
End: 2025-06-17
Attending: INTERNAL MEDICINE
Payer: MEDICARE

## 2025-06-17 PROCEDURE — 97165 OT EVAL LOW COMPLEX 30 MIN: CPT

## 2025-06-17 NOTE — THERAPY EVALUATION
Objective/Functional Outcome Measure:   AM-PAC: 11.10 % (UE)        48 min [x]Eval - untimed                        [x]  Patient Education billed concurrently with other procedures   [] Review HEP    [] Progressed/Changed HEP, detail:    [] Other detail:         Pain Level at end of session (0-10 scale): 0    Plan of Care / Statement of Necessity for Occupational Therapy Services     Assessment / key information:    Patient is a 68 y/o right hand dominant male referred to occupational therapy services secondary to difficulty walking/deconditioning following 5 days of hospitalization in May 2025. PMHx: dysphagia, COPD.  Patient reports decreased posture while walking as well fatigue/SOB.  Patient also notes right small finger stiffness/pain with activity.  TUG is WFL's.  Five times sit to stand  indicates a score less than functional limits.  Feel patient will benefit from skilled OT services to include  providing education on energy conservation and breathing strategies, etc, establish HEP, promote > activity tolerance  in order to > QOL, functional status. Patient will be schedule    Evaluation Complexity: History LOW Complexity : Brief history review  Examination MEDIUM Complexity : 3-5 performance deficits relating to physical, cognative, or psychosocial skils that result in activity limitations and / or participation restrictions Clinical Decision Making MEDIUM Complexity : Patient may present with comorbidities that affect occupational performnce. Miniml to moderate modification of tasks or assistance (eg, physical or verbal ) with assesment(s) is necessary to enable patient to complete evaluation   Overall Complexity Rating: LOW   Problem List: Decreased strength, Decreased ADL/functional abilities , and Decreased activity tolerance   Treatment Plan may include any combination of the following: Therapeutic exercise, Therapeutic activities, Neuromuscular re-education, Patient education, and

## 2025-06-19 ENCOUNTER — HOSPITAL ENCOUNTER (OUTPATIENT)
Facility: HOSPITAL | Age: 70
Setting detail: RECURRING SERIES
Discharge: HOME | End: 2025-06-22
Attending: INTERNAL MEDICINE
Payer: MEDICARE

## 2025-06-19 PROCEDURE — 92526 ORAL FUNCTION THERAPY: CPT

## 2025-06-19 NOTE — THERAPY RECERTIFICATION
and Time must be completed for valid certification **  Please sign and fax to [phone].  Thank you

## 2025-06-19 NOTE — PROGRESS NOTES
SPEECH LANGUAGE PATHOLOGY -  DAILY TREATMENT NOTE (updated 3/23)      Date: 2025          Patient Name:  Prabhakar Moran :  1955   Medical   Diagnosis:  Dysphagia, oropharyngeal [R13.12] Treatment Diagnosis:  R13.12 Dysphagia, oropharyngeal phase and R13.13 Dysphagia, pharyngeal phase   Referral Source:  Houston Stevens MD Insurance:   Payor: [facility] MEDICARE / Plan: [facility] [employer] MEDIBLUE PLUS / Product Type: *No Product type* /                      Patient  verified yes     Visit #   Current  / Total 1 16-20   Time   In / Out 1415 1500   Total Treatment Time 45       SUBJECTIVE  If an interpreting service was utilized for treatment of this patient, the contents of this document represent the material reviewed with the patient via the .     Pain Level (0-10 scale): 0    Any medication changes, allergies to medications, adverse drug reactions, diagnosis change, or new procedure performed?: [x] No    [] Yes (see summary sheet for update)  Medications: Verified on Patient Summary List    Subjective functional status/changes:     Patient seen for therapy re certification for dysphagia tx.     OBJECTIVE    MBS results from 25 reviewed w/ patient and wife. Discussed importance of diet modifications and compensatory swallow strategies.   Discussed prognosis of swallow function given chronic dysphagia.   Oral motor largely WLF.   Patient ingested thin liquids via cup, single sips and larger volumes. Swallow initiated w/ delay. Reduced hyolaryngeal excursion and protraction to digital palpation.   Clinical indicators of penetration and aspiration present following thin liquids c/b delayed onset of coughing.   No clinical indicators of penetration or aspiration observed w/ mildly thick liquid trials. It is important to reiterate patient had silent aspiration on MBS completed 25 and 24.    Therapeutic Procedures:  Tx Min Procedure, Rationale, Specifics   45

## 2025-07-01 ENCOUNTER — HOSPITAL ENCOUNTER (OUTPATIENT)
Facility: HOSPITAL | Age: 70
Setting detail: RECURRING SERIES
Discharge: HOME | End: 2025-07-04
Attending: INTERNAL MEDICINE
Payer: MEDICARE

## 2025-07-01 ENCOUNTER — HOSPITAL ENCOUNTER (OUTPATIENT)
Facility: HOSPITAL | Age: 70
Setting detail: RECURRING SERIES
End: 2025-07-01
Attending: INTERNAL MEDICINE
Payer: MEDICARE

## 2025-07-01 PROCEDURE — 92526 ORAL FUNCTION THERAPY: CPT

## 2025-07-01 PROCEDURE — 97535 SELF CARE MNGMENT TRAINING: CPT

## 2025-07-01 PROCEDURE — 97110 THERAPEUTIC EXERCISES: CPT

## 2025-07-01 NOTE — PROGRESS NOTES
no adverse reaction       []redness - adverse reaction:     Patient was seen for 45 minutes for swallowing therapy in conjunction with NMES-3 applied to the suprahyoids ( 18.0 mA ). Patient ingested ice chips during muscle contraction.  Clinical indicators of penetration/aspiration not observed during NMES. Delayed onset of subtle vocal wetness noted. Patient cued to clear throat and educated to this clinical indicator of penetration and aspiration.     Patient participated in swallow strengthening exercise of effortful swallow using sEMG ( biofeedback ) and visual game for strength. Patient targeted 6/8 rings.    Further education provided on diet modifications, use of thickener and free water protocol.             Therapeutic Procedures:  Tx Min Procedure, Rationale, Specifics   45 Dysphagia Treatment: increase/improve swallow function and reduced aspiration utlization compensatory swallow strategies, diet modifications and swallow strengthening.     Details if applicable:     45    Total        [x] Review HEP    [] Progressed/Changed HEP, detail:    [] Other detail:         Pain Level at end of session (0-10 scale): 0      Assessment     Patient continues w/ dysphagia and risk of aspiration given recent hospitalization for R lobe PNA. Etiology of dysphagia is still unknown at this time. Patient does have an enlarged thyroid goiter which did not appear to impact swallow function on MBS, unless there is nerve involvement. Patient may benefit from neurology consult for further work-up.   Recommend patient to continue w/ Soft diet/ Easy to chew and mildly thick liquids w/ use of compensatory swallow strategies. Free water protocol.   Dysphagia treatment to resume w/ swallowing strengthening exercises, compensatory swallow strategies, diet modifications and NMES.     Patient will continue to benefit from skilled SLP services to modify and progress therapeutic interventions to address functional deficits and attain

## 2025-07-01 NOTE — PROGRESS NOTES
OCCUPATIONAL THERAPY - MEDICARE DAILY TREATMENT NOTE (updated 3/23)      Date: 2025          Patient Name:  Prabhakar Moran :  1955   Medical   Diagnosis:  History of acute respiratory failure [Z87.09]  History of pneumonia [Z87.01]  Difficulty in walking [R26.2] Treatment Diagnosis:  M62.81  GENERAL MUSCLE WEAKNESS    Referral Source:  Houston Stevens MD Insurance:   Payor: [facility] MEDICARE / Plan: [facility] [employer]ERS MEDIBLUE PLUS / Product Type: *No Product type* /                     Patient  verified yes     Visit #   Current  / Total 2 8-10   Time   In / Out 1120 1206   Total Treatment Time 46   Total Timed Codes 46   1:1 Treatment Time 46      [facility] Totals Reminder:  bill using total billable   min of TIMED therapeutic procedures and modalities.   8-22 min = 1 unit; 23-37 min = 2 units; 38-52 min = 3 units; 53-67 min = 4 units; 68-82 min = 5 units         SUBJECTIVE  If an interpreting service was utilized for treatment of this patient, the contents of this document represent the material reviewed with the patient via the .     Pain Level (0-10 scale): 0    Any medication changes, allergies to medications, adverse drug reactions, diagnosis change, or new procedure performed?: [x] No    [] Yes (see summary sheet for update)  Medications: Verified on Patient Summary List    Subjective functional status/changes:     --patient is scheduled for a doppler for the RLE on 25 at 2 pm  --patient feels his SOB is getting a little  better    OBJECTIVE      Therapeutic Procedures:  Tx Min Billable or 1:1 Min (if diff from Tx Min) Procedure, Rationale, Specifics   30 30 [id number] Therapeutic Exercise (timed):  increase ROM, strength, coordination, balance, and proprioception to improve patient's ability to progress to PLOF and address remaining functional goals. (see flow sheet as applicable)    Details if applicable:  right hand ROM/Cyndi UE exercise     19447 Therapeutic Activity

## 2025-07-08 ENCOUNTER — HOSPITAL ENCOUNTER (OUTPATIENT)
Facility: HOSPITAL | Age: 70
Setting detail: RECURRING SERIES
Discharge: HOME | End: 2025-07-11
Attending: INTERNAL MEDICINE
Payer: MEDICARE

## 2025-07-08 PROCEDURE — 92526 ORAL FUNCTION THERAPY: CPT

## 2025-07-08 NOTE — PROGRESS NOTES
SPEECH LANGUAGE PATHOLOGY -  DAILY TREATMENT NOTE (updated 3/23)      Date: 2025          Patient Name:  Prabhakar Moran :  1955   Medical   Diagnosis:  Dysphagia, oropharyngeal [R13.12] Treatment Diagnosis:  R13.12 Dysphagia, oropharyngeal phase and R13.13 Dysphagia, pharyngeal phase   Referral Source:  Houston Stevens MD Insurance:   Payor: [facility] MEDICARE / Plan: [facility] [employer]EEPERS MEDIBLUE PLUS / Product Type: *No Product type* /                      Patient  verified yes     Visit #   Current  / Total 3 16-20   Time   In / Out 1115 1150   Total Treatment Time 35       SUBJECTIVE  If an interpreting service was utilized for treatment of this patient, the contents of this document represent the material reviewed with the patient via the .     Pain Level (0-10 scale): 0    Any medication changes, allergies to medications, adverse drug reactions, diagnosis change, or new procedure performed?: [x] No    [] Yes (see summary sheet for update)  Medications: Verified on Patient Summary List    Subjective functional status/changes:     Patient seen for swallow therapy.   He has no new c/o at this time.     OBJECTIVE    Modality rationale: To achieve a contraction of the suprahyoid muscle while maintaining patient comfort paramount and to simultaneously pair swallowing with NMES stimulation.    Goal   of NMES: [] Maintain and strengthen muscle mass during inactive periods-NPO  [x] Maintain/gain ROM for optimal hyolaryngeal excursion and airway closure  [x] Facilitate voluntary motor control for swallowing maneuvers  [x] Increase sensory awareness  [x] Restore normal balance to system as ascending sensory information is reintegrated into movement patterns   Type Additional Details   [] NMES:   [x] Guardian   [x] sEMG Location: Suprahyoids      [x] Skin assessment post-treatment:  [x]intact []redness- no adverse reaction       []redness - adverse reaction:     Patient was seen for 35

## 2025-07-09 ENCOUNTER — HOSPITAL ENCOUNTER (OUTPATIENT)
Facility: HOSPITAL | Age: 70
Discharge: HOME | End: 2025-07-11
Attending: INTERNAL MEDICINE
Payer: MEDICARE

## 2025-07-09 DIAGNOSIS — M79.604 RIGHT LEG PAIN: ICD-10-CM

## 2025-07-09 PROCEDURE — 93971 EXTREMITY STUDY: CPT

## 2025-07-15 ENCOUNTER — HOSPITAL ENCOUNTER (OUTPATIENT)
Facility: HOSPITAL | Age: 70
Setting detail: RECURRING SERIES
Discharge: HOME | End: 2025-07-18
Attending: INTERNAL MEDICINE
Payer: MEDICARE

## 2025-07-15 ENCOUNTER — APPOINTMENT (OUTPATIENT)
Facility: HOSPITAL | Age: 70
End: 2025-07-15
Attending: INTERNAL MEDICINE
Payer: MEDICARE

## 2025-07-15 PROCEDURE — 97110 THERAPEUTIC EXERCISES: CPT

## 2025-07-15 NOTE — PROGRESS NOTES
OCCUPATIONAL THERAPY - MEDICARE DAILY TREATMENT NOTE (updated 3/23)      Date: 7/15/2025          Patient Name:  Prabhakar Moran :  1955   Medical   Diagnosis:  History of acute respiratory failure [Z87.09]  History of pneumonia [Z87.01]  Difficulty in walking [R26.2] Treatment Diagnosis:  M62.81  GENERAL MUSCLE WEAKNESS    Referral Source:  Houston Stevens MD Insurance:   Payor: [facility] MEDICARE / Plan: [facility] [employer]EEPERS MEDIBLUE PLUS / Product Type: *No Product type* /                     Patient  verified yes     Visit #   Current  / Total 3 8-10   Time   In / Out 1050 1155   Total Treatment Time 65   Total Timed Codes 60   1:1 Treatment Time 60      [facility] Totals Reminder:  bill using total billable   min of TIMED therapeutic procedures and modalities.   8-22 min = 1 unit; 23-37 min = 2 units; 38-52 min = 3 units; 53-67 min = 4 units; 68-82 min = 5 units         SUBJECTIVE  If an interpreting service was utilized for treatment of this patient, the contents of this document represent the material reviewed with the patient via the .     Pain Level (0-10 scale): 0    Any medication changes, allergies to medications, adverse drug reactions, diagnosis change, or new procedure performed?: [x] No    [] Yes (see summary sheet for update)  Medications: Verified on Patient Summary List    Subjective functional status/changes:     --patient had the doppler test completed on his RLE on 25--No evidence of acute deep vein and superficial thrombosis in the right lower extremity was noted.    OBJECTIVE      Therapeutic Procedures:  Tx Min Billable or 1:1 Min (if diff from Tx Min) Procedure, Rationale, Specifics   60  75397 Therapeutic Exercise (timed):  increase ROM, strength, coordination, balance, and proprioception to improve patient's ability to progress to PLOF and address remaining functional goals. (see flow sheet as applicable)    Details if applicable:  right hand ROM/Cyndi UE

## 2025-07-18 ENCOUNTER — HOSPITAL ENCOUNTER (OUTPATIENT)
Facility: HOSPITAL | Age: 70
Setting detail: RECURRING SERIES
Discharge: HOME | End: 2025-07-21
Attending: INTERNAL MEDICINE
Payer: MEDICARE

## 2025-07-18 PROCEDURE — 97112 NEUROMUSCULAR REEDUCATION: CPT

## 2025-07-18 PROCEDURE — 97161 PT EVAL LOW COMPLEX 20 MIN: CPT

## 2025-07-18 NOTE — THERAPY EVALUATION
chair stand: 11 repetitions (age referenced norms 12-17)    Balance/Specific Tests:   Romberg stance: 30s EO/EC, no LOB  Tandem stance: 30s R foot behind, 27s L foot behind  SLS: R/L 5s bilaterally    Objective/Functional Outcome Measure: Basic Mobility  AM-PAC: 6.88%  AM-PAC score = an established functional score where 0% = no disability       40 min [x]Eval - untimed                        Therapeutic Procedures:  Tx Min Billable or 1:1 Min (if diff from Tx Min) Procedure, Rationale, Specifics   15  05656 Neuromuscular Re-Education (timed):  improve balance, coordination, kinesthetic sense, posture, core stability and proprioception to improve patient's ability to develop conscious control of individual muscles and awareness of position of extremities in order to progress to PLOF and address remaining functional goals. (see flow sheet as applicable)    Details if applicable:  HEP and posture correction   15     Total Total     [x]  Patient Education billed concurrently with other procedures   [x] Review HEP    [] Progressed/Changed HEP, detail:    [] Other detail:         Pain Level at end of session (0-10 scale): 0/10    Plan of Care / Statement of Necessity for Physical Therapy Services     Assessment / key information:  Pt is a 69 yo male presenting to PT with diagnosis of difficulty walking s/p respiratory failure/pneumonia.  Was hospitalized for this in May.  Pt is currently doing well with all gait and mobility tasks, demonstrated 6 MWT of 1478 ft without SOB and SpO2 stable, strength WNL, TUG WNL indicating low fall risk, and no difficulty with functional mobility tasks.  Pt did have slight difficulty with SLS, <5s idalia.  Pt does have significant forward head posture, with head resting in significant cervical flexion, increased thoracic kyphosis and protracted shoulders.  Pt is unable to fully correct his posture.  Reports mild L sided cervical pain and is interested in improving his posture.  Plan to

## 2025-07-22 ENCOUNTER — HOSPITAL ENCOUNTER (OUTPATIENT)
Facility: HOSPITAL | Age: 70
Setting detail: RECURRING SERIES
Discharge: HOME | End: 2025-07-25
Attending: INTERNAL MEDICINE
Payer: MEDICARE

## 2025-07-22 ENCOUNTER — APPOINTMENT (OUTPATIENT)
Facility: HOSPITAL | Age: 70
End: 2025-07-22
Attending: INTERNAL MEDICINE
Payer: MEDICARE

## 2025-07-22 PROCEDURE — 97112 NEUROMUSCULAR REEDUCATION: CPT

## 2025-07-22 PROCEDURE — 92526 ORAL FUNCTION THERAPY: CPT

## 2025-07-22 NOTE — PROGRESS NOTES
SPEECH LANGUAGE PATHOLOGY -  DAILY TREATMENT NOTE (updated 3/23)      Date: 2025          Patient Name:  Prabhakar Moran :  1955   Medical   Diagnosis:  Dysphagia, oropharyngeal [R13.12] Treatment Diagnosis:  R13.12 Dysphagia, oropharyngeal phase and R13.13 Dysphagia, pharyngeal phase   Referral Source:  Houston Stevens MD Insurance:   Payor: [facility] MEDICARE / Plan: [facility] [employer]ALTH[employer]EEPERS MEDIBLUE PLUS / Product Type: *No Product type* /                      Patient  verified yes     Visit #   Current  / Total 4 16-20   Time   In / Out 1145 1215   Total Treatment Time 30       SUBJECTIVE  If an interpreting service was utilized for treatment of this patient, the contents of this document represent the material reviewed with the patient via the .     Pain Level (0-10 scale): 0    Any medication changes, allergies to medications, adverse drug reactions, diagnosis change, or new procedure performed?: [x] No    [] Yes (see summary sheet for update)  Medications: Verified on Patient Summary List    Subjective functional status/changes:     Patient seen for swallow therapy.   He has no new c/o at this time.   Reports tolerating all solid textures and using thickener in everything but his water.     OBJECTIVE    Modality rationale: To achieve a contraction of the suprahyoid muscle while maintaining patient comfort paramount and to simultaneously pair swallowing with NMES stimulation.    Goal   of NMES: [] Maintain and strengthen muscle mass during inactive periods-NPO  [x] Maintain/gain ROM for optimal hyolaryngeal excursion and airway closure  [x] Facilitate voluntary motor control for swallowing maneuvers  [x] Increase sensory awareness  [x] Restore normal balance to system as ascending sensory information is reintegrated into movement patterns   Type Additional Details   [] NMES:   [x] Guardian   [x] sEMG Location: Suprahyoids      [x] Skin assessment post-treatment:  [x]intact

## 2025-07-22 NOTE — PROGRESS NOTES
[facility] [address], [address]  [place] [address]  Ph: [phone]     Fax: [phone]    SPEECH LANGUAGE PATHOLOGY PROGRESS NOTE  Patient Name:  Prabhakar Moran :  1955   Treatment/Medical Diagnosis: Dysphagia, oropharyngeal [R13.12]   Referral Source:  Houston Stevens MD     Date of Initial Visit for new POC:  25 Attended Visits:  4 Missed Visits:  0     SUMMARY OF TREATMENT/ASSESSMENT:    Patient has been seen for x4 visits since re-certification s/t limited visits approved by insurance. Patient is being followed x1 day a week at this time.   Patient working on independent use of compensatory swallow strategies to reduce aspiration risk. He continues to participate in NMES and swallow tx.   Etiology of dysphagia is still unknown at this time. Patient does have an enlarged thyroid goiter which did not appear to impact swallow function on MBS, unless there is nerve involvement. Patient may benefit from neurology consult for further work-up.   Recommend patient to continue w/ Soft diet/ Easy to chew and mildly thick liquids w/ use of compensatory swallow strategies. Free water protocol.   Dysphagia treatment to resume w/ swallowing strengthening exercises, compensatory swallow strategies, diet modifications and NMES.     CURRENT STATUS/GOALS:      Short Term Goals: To be accomplished in 8-10 treatments.  -Recall compensatory swallow strategies independently  [] Met [] Not met [x] Partially met  Date: 25  -Recall aspiration precautions independently  [] Met [] Not met [x] Partially met  Date: 25  -Patient will tolerate least restrictive diet w/o clinical indicators of penetration or aspiration liquids and using compensatory swallow strategies on 10/10 swallows   [x] Met [] Not met [] Partially met  Date: 25  -Participate in NMES Protocol   [] Met [] Not met [x] Partially met  Date:   -improve sEMG readings; following x 10 tx of

## 2025-07-22 NOTE — PROGRESS NOTES
PHYSICAL THERAPY - MEDICARE DAILY TREATMENT NOTE (updated 3/23)      Date: 2025          Patient Name:  Prabhakar Moran :  1955   Medical   Diagnosis:  History of acute respiratory failure [Z87.09]  History of pneumonia [Z87.01]  Difficulty in walking [R26.2] Treatment Diagnosis:  M54.2  NECK PAIN    Referral Source:  Houston Stevens MD Insurance:   Payor: [facility] MEDICARE / Plan: [facility] [employer]ALTHKEEPERS MEDIBLUE PLUS / Product Type: *No Product type* /                     Patient  verified yes     Visit #   Current  / Total 2 8-12   Time   In / Out 1102am 1220pm   Total Treatment Time 46   Total Timed Codes 31   1:1 Treatment Time 31      [facility] Totals Reminder:  bill using total billable   min of TIMED therapeutic procedures and modalities.   8-22 min = 1 unit; 23-37 min = 2 units; 38-52 min = 3 units; 53-67 min = 4 units; 68-82 min = 5 units            SUBJECTIVE  If an interpreting service was utilized for treatment of this patient, the contents of this document represent the material reviewed with the patient via the .     Pain Level (0-10 scale): 0/10    Any medication changes, allergies to medications, adverse drug reactions, diagnosis change, or new procedure performed?: [x] No    [] Yes (see summary sheet for update)  Medications: Verified on Patient Summary List    Subjective functional status/changes:     Pt doing well today, no complaints.  He has tried the postural correction exercise in supine with towel rolls under head.    OBJECTIVE      Therapeutic Procedures:  Tx Min Billable or 1:1 Min (if diff from Tx Min) Procedure, Rationale, Specifics   32  97194 Neuromuscular Re-Education (timed):  improve balance, coordination, kinesthetic sense, posture, core stability and proprioception to improve patient's ability to develop conscious control of individual muscles and awareness of position of extremities in order to progress to PLOF and address remaining functional goals.

## 2025-07-24 ENCOUNTER — HOSPITAL ENCOUNTER (OUTPATIENT)
Facility: HOSPITAL | Age: 70
Setting detail: RECURRING SERIES
Discharge: HOME | End: 2025-07-27
Attending: INTERNAL MEDICINE
Payer: MEDICARE

## 2025-07-24 ENCOUNTER — APPOINTMENT (OUTPATIENT)
Facility: HOSPITAL | Age: 70
End: 2025-07-24
Attending: INTERNAL MEDICINE
Payer: MEDICARE

## 2025-07-24 PROCEDURE — 97110 THERAPEUTIC EXERCISES: CPT

## 2025-07-24 PROCEDURE — 97112 NEUROMUSCULAR REEDUCATION: CPT

## 2025-07-24 NOTE — PROGRESS NOTES
PHYSICAL THERAPY - MEDICARE DAILY TREATMENT NOTE (updated 3/23)      Date: 2025          Patient Name:  Prabhakar Moran :  1955   Medical   Diagnosis:  History of acute respiratory failure [Z87.09]  History of pneumonia [Z87.01]  Difficulty in walking [R26.2] Treatment Diagnosis:  M54.2  NECK PAIN    Referral Source:  Houston Stevens MD Insurance:   Payor: [facility] MEDICARE / Plan: [facility] [employer] PLUS / Product Type: *No Product type* /                     Patient  verified yes     Visit #   Current  / Total 3 8-12   Time   In / Out 944am 1041am   Total Treatment Time 52   Total Timed Codes 42   1:1 Treatment Time 42      [facility] Totals Reminder:  bill using total billable   min of TIMED therapeutic procedures and modalities.   8-22 min = 1 unit; 23-37 min = 2 units; 38-52 min = 3 units; 53-67 min = 4 units; 68-82 min = 5 units            SUBJECTIVE  If an interpreting service was utilized for treatment of this patient, the contents of this document represent the material reviewed with the patient via the .     Pain Level (0-10 scale): 0/10    Any medication changes, allergies to medications, adverse drug reactions, diagnosis change, or new procedure performed?: [x] No    [] Yes (see summary sheet for update)  Medications: Verified on Patient Summary List    Subjective functional status/changes:     Pt reports compliance to HEP. Heat felt good last session.    OBJECTIVE      Therapeutic Procedures:  Tx Min Billable or 1:1 Min (if diff from Tx Min) Procedure, Rationale, Specifics   15  [id number] Neuromuscular Re-Education (timed):  improve balance, coordination, kinesthetic sense, posture, core stability and proprioception to improve patient's ability to develop conscious control of individual muscles and awareness of position of extremities in order to progress to PLOF and address remaining functional goals. (see flow sheet as applicable)     Details if applicable:

## 2025-07-31 ENCOUNTER — APPOINTMENT (OUTPATIENT)
Facility: HOSPITAL | Age: 70
End: 2025-07-31
Attending: INTERNAL MEDICINE
Payer: MEDICARE

## 2025-08-05 ENCOUNTER — HOSPITAL ENCOUNTER (OUTPATIENT)
Facility: HOSPITAL | Age: 70
Setting detail: RECURRING SERIES
Discharge: HOME | End: 2025-08-08
Attending: INTERNAL MEDICINE
Payer: MEDICARE

## 2025-08-05 PROCEDURE — 92526 ORAL FUNCTION THERAPY: CPT

## 2025-08-06 ENCOUNTER — OFFICE VISIT (OUTPATIENT)
Facility: CLINIC | Age: 70
End: 2025-08-06

## 2025-08-06 VITALS
DIASTOLIC BLOOD PRESSURE: 84 MMHG | WEIGHT: 192.7 LBS | HEART RATE: 70 BPM | BODY MASS INDEX: 27.59 KG/M2 | SYSTOLIC BLOOD PRESSURE: 150 MMHG | OXYGEN SATURATION: 99 % | RESPIRATION RATE: 18 BRPM | HEIGHT: 70 IN | TEMPERATURE: 97.3 F

## 2025-08-06 DIAGNOSIS — Z11.59 NEED FOR HEPATITIS C SCREENING TEST: ICD-10-CM

## 2025-08-06 DIAGNOSIS — I10 BENIGN ESSENTIAL HYPERTENSION: ICD-10-CM

## 2025-08-06 DIAGNOSIS — Z12.11 COLON CANCER SCREENING: ICD-10-CM

## 2025-08-06 DIAGNOSIS — R13.12 DYSPHAGIA, OROPHARYNGEAL: ICD-10-CM

## 2025-08-06 DIAGNOSIS — R73.01 ELEVATED FASTING BLOOD SUGAR: ICD-10-CM

## 2025-08-06 DIAGNOSIS — Z12.5 SCREENING PSA (PROSTATE SPECIFIC ANTIGEN): ICD-10-CM

## 2025-08-06 DIAGNOSIS — J45.909 MODERATE ASTHMA, UNSPECIFIED WHETHER COMPLICATED, UNSPECIFIED WHETHER PERSISTENT: ICD-10-CM

## 2025-08-06 DIAGNOSIS — Z00.00 MEDICARE ANNUAL WELLNESS VISIT, SUBSEQUENT: Primary | ICD-10-CM

## 2025-08-06 RX ORDER — SPIRONOLACTONE 25 MG/1
25 TABLET ORAL DAILY
Qty: 90 TABLET | Refills: 1 | Status: SHIPPED | OUTPATIENT
Start: 2025-08-06

## 2025-08-06 RX ORDER — BLOOD PRESSURE TEST KIT
KIT MISCELLANEOUS
Qty: 1 KIT | Refills: 0 | Status: SHIPPED | OUTPATIENT
Start: 2025-08-06

## 2025-08-06 RX ORDER — SPIRONOLACTONE 25 MG/1
25 TABLET ORAL DAILY
Qty: 90 TABLET | Refills: 1 | Status: SHIPPED | OUTPATIENT
Start: 2025-08-06 | End: 2025-08-06 | Stop reason: SDUPTHER

## 2025-08-06 ASSESSMENT — PATIENT HEALTH QUESTIONNAIRE - PHQ9
SUM OF ALL RESPONSES TO PHQ QUESTIONS 1-9: 0
SUM OF ALL RESPONSES TO PHQ QUESTIONS 1-9: 0
1. LITTLE INTEREST OR PLEASURE IN DOING THINGS: NOT AT ALL
SUM OF ALL RESPONSES TO PHQ QUESTIONS 1-9: 0
2. FEELING DOWN, DEPRESSED OR HOPELESS: NOT AT ALL
SUM OF ALL RESPONSES TO PHQ QUESTIONS 1-9: 0

## 2025-08-08 ENCOUNTER — HOSPITAL ENCOUNTER (OUTPATIENT)
Facility: HOSPITAL | Age: 70
Setting detail: RECURRING SERIES
Discharge: HOME | End: 2025-08-11
Attending: INTERNAL MEDICINE
Payer: MEDICARE

## 2025-08-08 PROCEDURE — 97110 THERAPEUTIC EXERCISES: CPT

## 2025-08-08 PROCEDURE — 97112 NEUROMUSCULAR REEDUCATION: CPT

## 2025-08-12 ENCOUNTER — APPOINTMENT (OUTPATIENT)
Facility: HOSPITAL | Age: 70
End: 2025-08-12
Attending: INTERNAL MEDICINE
Payer: MEDICARE

## 2025-08-20 DIAGNOSIS — E55.9 VITAMIN D DEFICIENCY: ICD-10-CM

## 2025-08-20 DIAGNOSIS — I10 ESSENTIAL HYPERTENSION, BENIGN: ICD-10-CM

## 2025-08-20 DIAGNOSIS — J45.909 MODERATE ASTHMA, UNSPECIFIED WHETHER COMPLICATED, UNSPECIFIED WHETHER PERSISTENT: ICD-10-CM

## 2025-08-20 RX ORDER — IPRATROPIUM BROMIDE AND ALBUTEROL SULFATE 2.5; .5 MG/3ML; MG/3ML
SOLUTION RESPIRATORY (INHALATION)
Qty: 360 ML | Refills: 3 | Status: SHIPPED | OUTPATIENT
Start: 2025-08-20

## 2025-08-26 ENCOUNTER — HOSPITAL ENCOUNTER (OUTPATIENT)
Facility: HOSPITAL | Age: 70
Setting detail: RECURRING SERIES
Discharge: HOME | End: 2025-08-29
Attending: INTERNAL MEDICINE
Payer: MEDICARE

## 2025-08-26 PROCEDURE — 92526 ORAL FUNCTION THERAPY: CPT

## 2025-08-28 ENCOUNTER — HOSPITAL ENCOUNTER (OUTPATIENT)
Facility: HOSPITAL | Age: 70
Setting detail: RECURRING SERIES
Discharge: HOME | End: 2025-08-31
Attending: INTERNAL MEDICINE
Payer: MEDICARE

## 2025-08-28 PROCEDURE — 92526 ORAL FUNCTION THERAPY: CPT

## 2025-09-02 ENCOUNTER — HOSPITAL ENCOUNTER (OUTPATIENT)
Facility: HOSPITAL | Age: 70
Setting detail: RECURRING SERIES
Discharge: HOME | End: 2025-09-05
Attending: INTERNAL MEDICINE
Payer: MEDICARE

## 2025-09-02 PROCEDURE — 92526 ORAL FUNCTION THERAPY: CPT
